# Patient Record
Sex: FEMALE | Race: WHITE | NOT HISPANIC OR LATINO
[De-identification: names, ages, dates, MRNs, and addresses within clinical notes are randomized per-mention and may not be internally consistent; named-entity substitution may affect disease eponyms.]

---

## 2020-12-29 PROBLEM — Z00.00 ENCOUNTER FOR PREVENTIVE HEALTH EXAMINATION: Status: ACTIVE | Noted: 2020-12-29

## 2021-01-04 ENCOUNTER — APPOINTMENT (OUTPATIENT)
Dept: ENDOCRINOLOGY | Facility: CLINIC | Age: 71
End: 2021-01-04
Payer: MEDICARE

## 2021-01-04 VITALS
BODY MASS INDEX: 33.8 KG/M2 | SYSTOLIC BLOOD PRESSURE: 128 MMHG | WEIGHT: 198 LBS | HEIGHT: 64 IN | DIASTOLIC BLOOD PRESSURE: 76 MMHG | TEMPERATURE: 97.7 F

## 2021-01-04 PROCEDURE — 99072 ADDL SUPL MATRL&STAF TM PHE: CPT

## 2021-01-04 PROCEDURE — 99204 OFFICE O/P NEW MOD 45 MIN: CPT

## 2021-01-04 NOTE — HISTORY OF PRESENT ILLNESS
[FreeTextEntry1] : New patient presents for evaluation for adrenal adenoma. \par Found incidentally on MRI of ultrasound\par \par C/O increased urination, irregular bowls, hypertension (220/110 at her vascular doctor, went to ER and all tests in the ER were negative)(Has not had blood pressure issues since BP stable at home on cuff, taking 2x a day) \par \par BP stable in office 128/76\par Amlodipine 5 mg daily\par Losartan 100 mg daily\par Chlorthalidone 25 mg daily\par \par No imaging present today\par Labs from 10/2020 and 11/2020 present\par \par Hypothyroid\par S/P FELDMAN approx 40 years for likely hyperthyroid\par Monitored by her PCP\par Current regimen: Synthroid 88 mcg\par Patient advised to take every day in the morning, on an empty stomach, and with no other medications. \par \par HLD\par NO updated lipid panel\par Rosuvastatin 5 mg daily

## 2021-01-04 NOTE — REVIEW OF SYSTEMS
[Recent Weight Gain (___ Lbs)] : recent weight gain: [unfilled] lbs [Blurred Vision] : blurred vision [Palpitations] : palpitations [SOB on Exertion] : shortness of breath on exertion [Polyuria] : polyuria [Nocturia] : nocturia [Polydipsia] : polydipsia [Fatigue] : no fatigue [Visual Field Defect] : no visual field defect [Dysphagia] : no dysphagia [Neck Pain] : no neck pain [Dysphonia] : no dysphonia [Chest Pain] : no chest pain [Constipation] : no constipation [Diarrhea] : no diarrhea [FreeTextEntry3] : due for ophtho exam [FreeTextEntry5] : anxiety related [FreeTextEntry8] : 9x a night

## 2021-01-04 NOTE — REASON FOR VISIT
[Initial Evaluation] : an initial evaluation [Adrenal Evaluation/Adrenal Disorder] : adrenal evaluation/adrenal disorder

## 2021-01-04 NOTE — ASSESSMENT
[FreeTextEntry1] : Adrenal adenoma\par -Will need MRI results from Dr Almonte office for MRI abdomen\par -For now, episode of htn and 50 lb weight gain in 3 years warrants further work up. Dexamethasone suppression test accompanied by biochemical plasma work up to be done\par -Will call with results\par \par \par Hyperthyroid/ Post procedural hypothyroid\par -Continue LT4 as per PCP-if pt continues to follow with our office, will continue to monitor TFTs\par \par HTN\par -Continue BP regimen as per PCP\par \par HLD\par -Continue statin\par \par Pt complaints of irregular bowls and urinary frequency- advised to follow with GI and urology\par Will base follow up frequency off lab results

## 2021-02-23 ENCOUNTER — NON-APPOINTMENT (OUTPATIENT)
Age: 71
End: 2021-02-23

## 2021-03-10 ENCOUNTER — NON-APPOINTMENT (OUTPATIENT)
Age: 71
End: 2021-03-10

## 2021-06-23 ENCOUNTER — APPOINTMENT (OUTPATIENT)
Dept: ENDOCRINOLOGY | Facility: CLINIC | Age: 71
End: 2021-06-23

## 2021-06-29 ENCOUNTER — APPOINTMENT (OUTPATIENT)
Dept: ENDOCRINOLOGY | Facility: CLINIC | Age: 71
End: 2021-06-29

## 2021-07-06 ENCOUNTER — APPOINTMENT (OUTPATIENT)
Dept: ENDOCRINOLOGY | Facility: CLINIC | Age: 71
End: 2021-07-06
Payer: MEDICARE

## 2021-07-06 VITALS
SYSTOLIC BLOOD PRESSURE: 138 MMHG | DIASTOLIC BLOOD PRESSURE: 90 MMHG | BODY MASS INDEX: 33.63 KG/M2 | OXYGEN SATURATION: 97 % | WEIGHT: 197 LBS | HEART RATE: 54 BPM | HEIGHT: 64 IN

## 2021-07-06 PROCEDURE — 99214 OFFICE O/P EST MOD 30 MIN: CPT

## 2021-07-06 NOTE — HISTORY OF PRESENT ILLNESS
[FreeTextEntry1] : Evaluation for adrenal adenoma. \par Found incidentally on MRI of abdomen\par \par C/O increased urination, irregular bowls, hypertension (220/110 at her vascular doctor, went to ER and all tests in the ER were negative)(Has not had blood pressure issues since BP stable at home on cuff, taking 2x a day) , hip pain (on tramadol) , has gained 50 lbs since COVID pandemic (admits to not indulging to the point of gaining 50 lbs), dry skin, +muscle weakness, terrible balance, thick mucous \par \par On exam, + small dorsal fat pad \par \par On labs, adrenal glands are making too much cortisol ( abnormal dex suppression test/midnight salivary cortisol) \par \par Pt still having Hypertensive episodes \par BP stable in office 138/90\par Amlodipine 5 mg daily\par Losartan 100 mg daily\par Chlorthalidone 25 mg daily\par \par Has Osteoporosis \par Was on Prolia, but is now 2 years past due (was on it for a few years)- was controlled by her rheumatologist \par Has had no fractures but 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 \par \par HLD\par No updated lipid panel\par Rosuvastatin 5 mg daily

## 2021-07-06 NOTE — REVIEW OF SYSTEMS
[Fatigue] : fatigue [Recent Weight Gain (___ Lbs)] : recent weight gain: [unfilled] lbs [Muscle Weakness] : muscle weakness [Dry Skin] : dry skin [Chest Pain] : no chest pain [Shortness Of Breath] : no shortness of breath [Constipation] : no constipation [Diarrhea] : no diarrhea

## 2021-07-06 NOTE — ASSESSMENT
[FreeTextEntry1] : Dr. Patricia came to consult patient in office visit  \par \par Adrenal adenoma\par -Discussed with patient our findings from lab results/concerns about cushings syndrome\par -Discussed what further work up is required at this time- random AM cortisol and ACTH\par -Discussed medical treatment for Cushing's syndrome but risks vs benefits , we do not think this is the best route for pt\par -Discussed potential for adrenalectomy if lab values show a suppressed ACTH and normal or high cortisol. If ACTH not suppressed, further work up is needed. \par -Will call with results and plan\par \par Osteoporosis \par -May consider patient being treated by our office. Was on prolia for many years but fell off regimen due to pandemic. Will evaluate calcium and vitamin d levels, also due for DEXA 11/2021.\par \par Hyperthyroid/ Post procedural hypothyroid\par -Continue LT4 as per PCP-need updated TFTs will next labs\par \par HTN\par -Continue BP regimen as per PCP, if cushings is ruled in, it is likely the cause of this HTN.\par -Will work together with PCP to formulate a plan if +cushings\par \par HLD\par -Continue statin as per PCP\par \par RTO

## 2021-07-22 ENCOUNTER — NON-APPOINTMENT (OUTPATIENT)
Age: 71
End: 2021-07-22

## 2021-07-29 ENCOUNTER — APPOINTMENT (OUTPATIENT)
Dept: SURGICAL ONCOLOGY | Facility: CLINIC | Age: 71
End: 2021-07-29
Payer: MEDICARE

## 2021-07-29 VITALS
SYSTOLIC BLOOD PRESSURE: 168 MMHG | BODY MASS INDEX: 33.12 KG/M2 | TEMPERATURE: 97.8 F | OXYGEN SATURATION: 97 % | HEART RATE: 68 BPM | RESPIRATION RATE: 16 BRPM | DIASTOLIC BLOOD PRESSURE: 90 MMHG | HEIGHT: 64 IN | WEIGHT: 194 LBS

## 2021-07-29 DIAGNOSIS — Z87.891 PERSONAL HISTORY OF NICOTINE DEPENDENCE: ICD-10-CM

## 2021-07-29 DIAGNOSIS — Z80.41 FAMILY HISTORY OF MALIGNANT NEOPLASM OF OVARY: ICD-10-CM

## 2021-07-29 DIAGNOSIS — Z78.9 OTHER SPECIFIED HEALTH STATUS: ICD-10-CM

## 2021-07-29 PROCEDURE — 99205 OFFICE O/P NEW HI 60 MIN: CPT

## 2021-07-30 NOTE — HISTORY OF PRESENT ILLNESS
[de-identified] : Ms. SONAM JENSEN  is a 70 year  old female with PMHx HTN, HLD, Obesity presenting for an initial consultation, referred by Dr. Román Patricia. \par \par Ms. Jensen was incidentally found to have a left 1.1 cm adrenal nodule on an MRI of her lumbosacral spine.  Subsequently,.she underwent an abdominal MRI in 10/2020 revealing 2 hepatic cysts, possible gallbladder wall adenomyosis, and 2 left adrenal nodules measuring 2.5 x 1.4 cm each.  This MRI was limited due to lack of IV contrast.   \par \par At the time, pt. had reported a 50 lb weight gain over the past 3 years despite not overindulging and high blood pressure despite being on 3 medications.  She sought care with Dr. Henning (Endocrinology) and underwent further workup.  \par \par MRI Abdomen w/ IV contrast performed on 5/21/2021 showed 2 adrenal adenomas (the left gland has a upper pole 7 mm nodule and a lower pole 17 x 22 mm nodule -consistent with adrenal adenomas).  Other findings include liver cysts.  \par \par Pt. Cushing's syndrome confirmed by failure of dexamethasone suppression and elevated midnight salivary cortisol. ACTH suppressed, so this is consistent with adrenal hyperfunction due to left adrenal adenoma. Advised surgical consult. \par \par BW:  Free urine cortisol (23.7- WNL); Cortisol Saliva (0.34) Metanephrine (33-L); Total Metanephrines (171- L); Normetanephrine (138- WNL);\par \par Pt reports a 50 lb weight gain over the past few years, especially in the mid section. She states she is very anxious and often feels stressed. She feels tired all the time, has muscle pains, feels weak.  Dry skin.  Increased urination. B/L leg cellulitis 2 months ago- now resolved. Osteoporosis.  Continues to have high blood pressure. States her BP was 200/101 this morning.  Reports pelvic cramping and lower abdominal discomfort, frequent urination. \par \par PMH: HTN, HLD, Obesity, Osteoporosis, s/p FELDMAN for hyperthyroidism 40 years ago, now with hypothyroidism (on Synthroid 88 mcg per day), anxiety\par PSH: Cataract surgery, MELECIO-BSO in her late 40's for ovarian cysts, benign left breast excisional biopsy, sinus surgery, tonsillectomy. \par Social Hx: Former smoker (1-1.5 ppd x 40 years, quit 2018), social alcohol use,  51 years, 1 son, 2 grandchildren. \par Family Hx: Mother with ovarian cancer in ther late 40's. Does not know paternal history.

## 2021-07-30 NOTE — REASON FOR VISIT
[Initial Consultation] : an initial consultation for [FreeTextEntry2] : Adrenal adenomas- Cushing's Syndrome

## 2021-07-30 NOTE — PHYSICAL EXAM
[Normal] : supple, no neck mass and thyroid not enlarged [Normal Neck Lymph Nodes] : normal neck lymph nodes  [Normal Supraclavicular Lymph Nodes] : normal supraclavicular lymph nodes [Normal Groin Lymph Nodes] : normal groin lymph nodes [Normal Axillary Lymph Nodes] : normal axillary lymph nodes [Normal] : oriented to person, place and time, with appropriate affect [de-identified] : obese, soft, ND, NT

## 2021-07-30 NOTE — REVIEW OF SYSTEMS
[Negative] : Heme/Lymph [FreeTextEntry2] : see HPI [FreeTextEntry8] : see HPI [de-identified] : see HPI [de-identified] : see HPI [de-identified] : see HPI

## 2021-07-30 NOTE — CONSULT LETTER
[Dear  ___] : Dear  [unfilled], [Consult Letter:] : I had the pleasure of evaluating your patient, [unfilled]. [Please see my note below.] : Please see my note below. [Consult Closing:] : Thank you very much for allowing me to participate in the care of this patient.  If you have any questions, please do not hesitate to contact me. [Sincerely,] : Sincerely, [FreeTextEntry3] : Shyam Brown MD, MPH, FACS, FSSO\par , [facility]\par [facility]\par Associate Professor of Surgery\par [employer] [facility]  [DrSilvia  ___] : Dr. NATH

## 2021-07-30 NOTE — ASSESSMENT
[FreeTextEntry1] : Ms. SONAM STEVENS  is a 70 year  old female with PMHx HTN, HLD, Obesity presenting for an initial consultation, referred by Dr. Román Patricia. Ms. Stevens was incidentally found to have a left 1.1 cm adrenal nodule on an MRI of her lumbosacral spine.  Subsequently,.she underwent an abdominal MRI in 10/2020 revealing 2 hepatic cysts, possible gallbladder wall adenomyosis, and 2 left adrenal nodules measuring 2.5 x 1.4 cm each.  This MRI was limited due to lack of IV contrast.   At the time, pt. had reported a 50 lb weight gain over the past 3 years despite not overindulging and high blood pressure despite being on 3 medications.  She sought care with Dr. Henning (Endocrinology) and underwent further workup.  MRI Abdomen w/ IV contrast performed on 5/21/2021 showed 2 adrenal adenomas (the left gland has a upper pole 7 mm nodule and a lower pole 17 x 22 mm nodule -consistent with adrenal adenomas).  Other findings include liver cysts.  Pt. Cushing's syndrome confirmed by failure of dexamethasone suppression and elevated midnight salivary cortisol. ACTH suppressed, so this is consistent with adrenal hyperfunction due to left adrenal adenoma. Advised surgical consult. \par \par PLAN:\par Laparoscopic robotic assisted LEFT adrenalectomy, possible open.  We discussed the risks, benefits, and alternatives of the procedure with the patient, she expressed understanding and agree to proceed.\par

## 2021-08-24 ENCOUNTER — OUTPATIENT (OUTPATIENT)
Dept: OUTPATIENT SERVICES | Facility: HOSPITAL | Age: 71
LOS: 1 days | End: 2021-08-24
Payer: COMMERCIAL

## 2021-08-24 VITALS
SYSTOLIC BLOOD PRESSURE: 160 MMHG | HEIGHT: 64 IN | WEIGHT: 195.55 LBS | RESPIRATION RATE: 18 BRPM | TEMPERATURE: 98 F | DIASTOLIC BLOOD PRESSURE: 85 MMHG | OXYGEN SATURATION: 98 % | HEART RATE: 57 BPM

## 2021-08-24 DIAGNOSIS — R94.31 ABNORMAL ELECTROCARDIOGRAM [ECG] [EKG]: ICD-10-CM

## 2021-08-24 DIAGNOSIS — E27.8 OTHER SPECIFIED DISORDERS OF ADRENAL GLAND: ICD-10-CM

## 2021-08-24 DIAGNOSIS — Z98.49 CATARACT EXTRACTION STATUS, UNSPECIFIED EYE: Chronic | ICD-10-CM

## 2021-08-24 DIAGNOSIS — Z01.818 ENCOUNTER FOR OTHER PREPROCEDURAL EXAMINATION: ICD-10-CM

## 2021-08-24 DIAGNOSIS — I10 ESSENTIAL (PRIMARY) HYPERTENSION: ICD-10-CM

## 2021-08-24 DIAGNOSIS — Z29.9 ENCOUNTER FOR PROPHYLACTIC MEASURES, UNSPECIFIED: ICD-10-CM

## 2021-08-24 DIAGNOSIS — Z91.89 OTHER SPECIFIED PERSONAL RISK FACTORS, NOT ELSEWHERE CLASSIFIED: ICD-10-CM

## 2021-08-24 DIAGNOSIS — E03.9 HYPOTHYROIDISM, UNSPECIFIED: ICD-10-CM

## 2021-08-24 DIAGNOSIS — D35.00 BENIGN NEOPLASM OF UNSPECIFIED ADRENAL GLAND: ICD-10-CM

## 2021-08-24 DIAGNOSIS — Z90.710 ACQUIRED ABSENCE OF BOTH CERVIX AND UTERUS: Chronic | ICD-10-CM

## 2021-08-24 LAB
A1C WITH ESTIMATED AVERAGE GLUCOSE RESULT: 5.1 % — SIGNIFICANT CHANGE UP (ref 4–5.6)
ALBUMIN SERPL ELPH-MCNC: 3.8 G/DL — SIGNIFICANT CHANGE UP (ref 3.3–5.2)
ALP SERPL-CCNC: 163 U/L — HIGH (ref 40–120)
ALT FLD-CCNC: 13 U/L — SIGNIFICANT CHANGE UP
ANION GAP SERPL CALC-SCNC: 12 MMOL/L — SIGNIFICANT CHANGE UP (ref 5–17)
APTT BLD: 28 SEC — SIGNIFICANT CHANGE UP (ref 27.5–35.5)
AST SERPL-CCNC: 19 U/L — SIGNIFICANT CHANGE UP
BASOPHILS # BLD AUTO: 0.05 K/UL — SIGNIFICANT CHANGE UP (ref 0–0.2)
BASOPHILS NFR BLD AUTO: 0.7 % — SIGNIFICANT CHANGE UP (ref 0–2)
BILIRUB SERPL-MCNC: 0.3 MG/DL — LOW (ref 0.4–2)
BLD GP AB SCN SERPL QL: SIGNIFICANT CHANGE UP
BUN SERPL-MCNC: 15.5 MG/DL — SIGNIFICANT CHANGE UP (ref 8–20)
CALCIUM SERPL-MCNC: 9.1 MG/DL — SIGNIFICANT CHANGE UP (ref 8.6–10.2)
CHLORIDE SERPL-SCNC: 107 MMOL/L — SIGNIFICANT CHANGE UP (ref 98–107)
CO2 SERPL-SCNC: 23 MMOL/L — SIGNIFICANT CHANGE UP (ref 22–29)
CREAT SERPL-MCNC: 0.56 MG/DL — SIGNIFICANT CHANGE UP (ref 0.5–1.3)
EOSINOPHIL # BLD AUTO: 0.17 K/UL — SIGNIFICANT CHANGE UP (ref 0–0.5)
EOSINOPHIL NFR BLD AUTO: 2.4 % — SIGNIFICANT CHANGE UP (ref 0–6)
ESTIMATED AVERAGE GLUCOSE: 100 MG/DL — SIGNIFICANT CHANGE UP (ref 68–114)
GLUCOSE SERPL-MCNC: 83 MG/DL — SIGNIFICANT CHANGE UP (ref 70–99)
HCT VFR BLD CALC: 43.6 % — SIGNIFICANT CHANGE UP (ref 34.5–45)
HGB BLD-MCNC: 14.4 G/DL — SIGNIFICANT CHANGE UP (ref 11.5–15.5)
IMM GRANULOCYTES NFR BLD AUTO: 0.3 % — SIGNIFICANT CHANGE UP (ref 0–1.5)
INR BLD: 0.88 RATIO — SIGNIFICANT CHANGE UP (ref 0.88–1.16)
LYMPHOCYTES # BLD AUTO: 1.51 K/UL — SIGNIFICANT CHANGE UP (ref 1–3.3)
LYMPHOCYTES # BLD AUTO: 21.2 % — SIGNIFICANT CHANGE UP (ref 13–44)
MCHC RBC-ENTMCNC: 29.9 PG — SIGNIFICANT CHANGE UP (ref 27–34)
MCHC RBC-ENTMCNC: 33 GM/DL — SIGNIFICANT CHANGE UP (ref 32–36)
MCV RBC AUTO: 90.6 FL — SIGNIFICANT CHANGE UP (ref 80–100)
MONOCYTES # BLD AUTO: 0.77 K/UL — SIGNIFICANT CHANGE UP (ref 0–0.9)
MONOCYTES NFR BLD AUTO: 10.8 % — SIGNIFICANT CHANGE UP (ref 2–14)
NEUTROPHILS # BLD AUTO: 4.59 K/UL — SIGNIFICANT CHANGE UP (ref 1.8–7.4)
NEUTROPHILS NFR BLD AUTO: 64.6 % — SIGNIFICANT CHANGE UP (ref 43–77)
PLATELET # BLD AUTO: 328 K/UL — SIGNIFICANT CHANGE UP (ref 150–400)
POTASSIUM SERPL-MCNC: 4.5 MMOL/L — SIGNIFICANT CHANGE UP (ref 3.5–5.3)
POTASSIUM SERPL-SCNC: 4.5 MMOL/L — SIGNIFICANT CHANGE UP (ref 3.5–5.3)
PROT SERPL-MCNC: 7.1 G/DL — SIGNIFICANT CHANGE UP (ref 6.6–8.7)
PROTHROM AB SERPL-ACNC: 10.3 SEC — LOW (ref 10.6–13.6)
RBC # BLD: 4.81 M/UL — SIGNIFICANT CHANGE UP (ref 3.8–5.2)
RBC # FLD: 15.2 % — HIGH (ref 10.3–14.5)
SODIUM SERPL-SCNC: 142 MMOL/L — SIGNIFICANT CHANGE UP (ref 135–145)
WBC # BLD: 7.11 K/UL — SIGNIFICANT CHANGE UP (ref 3.8–10.5)
WBC # FLD AUTO: 7.11 K/UL — SIGNIFICANT CHANGE UP (ref 3.8–10.5)

## 2021-08-24 PROCEDURE — 71046 X-RAY EXAM CHEST 2 VIEWS: CPT | Mod: 26

## 2021-08-24 PROCEDURE — 93005 ELECTROCARDIOGRAM TRACING: CPT

## 2021-08-24 PROCEDURE — 71046 X-RAY EXAM CHEST 2 VIEWS: CPT

## 2021-08-24 PROCEDURE — 93010 ELECTROCARDIOGRAM REPORT: CPT

## 2021-08-24 PROCEDURE — G0463: CPT

## 2021-08-24 NOTE — H&P PST ADULT - NSICDXFAMILYHX_GEN_ALL_CORE_FT
FAMILY HISTORY:  Mother  Still living? Unknown  FH: diabetes mellitus, Age at diagnosis: Age Unknown  FH: heart disease, Age at diagnosis: Age Unknown    Grandparent  Still living? Unknown  FH: diabetes mellitus, Age at diagnosis: Age Unknown  FH: heart disease, Age at diagnosis: Age Unknown

## 2021-08-24 NOTE — H&P PST ADULT - NEGATIVE NEUROLOGICAL SYMPTOMS
no transient paralysis/no weakness/no paresthesias/no generalized seizures/no focal seizures/no syncope/no tremors/no loss of sensation/no difficulty walking/no headache/no loss of consciousness/no hemiparesis

## 2021-08-24 NOTE — H&P PST ADULT - ASSESSMENT
CAPRINI SCORE    AGE RELATED RISK FACTORS                                                             [ ] Age 41-60 years                                            (1 Point)  [ ] Age: 61-74 years                                           (2 Points)                 [ ] Age= 75 years                                                (3 Points)             DISEASE RELATED RISK FACTORS                                                       [ ] Edema in the lower extremities                 (1 Point)                     [ ] Varicose veins                                               (1 Point)                                 [ ] BMI > 25 Kg/m2                                            (1 Point)                                  [ ] Serious infection (ie PNA)                            (1 Point)                     [ ] Lung disease ( COPD, Emphysema)            (1 Point)                                                                          [ ] Acute myocardial infarction                         (1 Point)                  [ ] Congestive heart failure (in the previous month)  (1 Point)         [ ] Inflammatory bowel disease                            (1 Point)                  [ ] Central venous access, PICC or Port               (2 points)       (within the last month)                                                                [ ] Stroke (in the previous month)                        (5 Points)    [ ] Previous or present malignancy                       (2 points)                                                                                                                                                         HEMATOLOGY RELATED FACTORS                                                         [ ] Prior episodes of VTE                                     (3 Points)                     [ ] Positive family history for VTE                      (3 Points)                  [ ] Prothrombin 54417 A                                     (3 Points)                     [ ] Factor V Leiden                                                (3 Points)                        [ ] Lupus anticoagulants                                      (3 Points)                                                           [ ] Anticardiolipin antibodies                              (3 Points)                                                       [ ] High homocysteine in the blood                   (3 Points)                                             [ ] Other congenital or acquired thrombophilia      (3 Points)                                                [ ] Heparin induced thrombocytopenia                  (3 Points)                                        MOBILITY RELATED FACTORS  [ ] Bed rest                                                         (1 Point)  [ ] Plaster cast                                                    (2 points)  [ ] Bed bound for more than 72 hours           (2 Points)    GENDER SPECIFIC FACTORS  [ ] Pregnancy or had a baby within the last month   (1 Point)  [ ] Post-partum < 6 weeks                                   (1 Point)  [ ] Hormonal therapy  or oral contraception   (1 Point)  [ ] History of pregnancy complications              (1 point)  [ ] Unexplained or recurrent              (1 Point)    OTHER RISK FACTORS                                           (1 Point)  [ ] BMI >40, smoking, diabetes requiring insulin, chemotherapy  blood transfusions and length of surgery over 2 hours    SURGERY RELATED RISK FACTORS  [ ]  Section within the last month     (1 Point)  [ ] Minor surgery                                                  (1 Point)  [ ] Arthroscopic surgery                                       (2 Points)  [ ] Planned major surgery lasting more            (2 Points)      than 45 minutes     [ ] Elective hip or knee joint replacement       (5 points)       surgery                                                TRAUMA RELATED RISK FACTORS  [ ] Fracture of the hip, pelvis, or leg                       (5 Points)  [ ] Spinal cord injury resulting in paralysis             (5 points)       (in the previous month)    [ ] Paralysis  (less than 1 month)                             (5 Points)  [ ] Multiple Trauma within 1 month                        (5 Points)    Total Score [        ]    Caprini Score 0-2: Low Risk, NO VTE prophylaxis required for most patients, encourage ambulation  Caprini Score 3-6: Moderate Risk , pharmacologic VTE prophylaxis is indicated for most patients (in the absence of contraindications)  Caprini Score Greater than or =7: High risk, pharmocologic VTE prophylaxis indicated for most patients (in the absence of contraindications)              OPIOID RISK TOOL    MATT EACH BOX THAT APPLIES AND ADD TOTALS AT THE END    FAMILY HISTORY OF SUBSTANCE ABUSE                 FEMALE         MALE                                                Alcohol                             [  ]1 pt          [ x ]3pts                                               Illegal Durgs                     [  ]2 pts        [  ]3pts                                               Rx Drugs                           [  ]4 pts        [  ]4 pts    PERSONAL HISTORY OF SUBSTANCE ABUSE                                                                                          Alcohol                             [  ]3 pts       [  ]3 pts                                               Illegal Durgs                     [  ]4 pts        [  ]4 pts                                               Rx Drugs                           [  ]5 pts        [  ]5 pts    AGE BETWEEN 16-45 YEARS                                      [  ]1 pt         [  ]1 pt    HISTORY OF PREADOLESCENT   SEXUAL ABUSE                                                             [  ]3 pts        [  ]0pts    PSYCHOLOGICAL DISEASE                     ADD, OCD, Bipolar, Schizophrenia        [  ]2 pts         [  ]2 pts                      Depression                                               [ x ]1 pt           [  ]1 pt           SCORING TOTAL                                      A score of 3 or lower indicated LOW risk for future opiod abuse  A score of 4 to 7 indicated moderate risk for future opiod abuse  A score of 8 or higher indicates a high risk for opiod abuse CAPRINI SCORE    AGE RELATED RISK FACTORS                                                             [ ] Age 41-60 years                                            (1 Point)  [x ] Age: 61-74 years                                           (2 Points)                 [ ] Age= 75 years                                                (3 Points)             DISEASE RELATED RISK FACTORS                                                       [] Edema in the lower extremities                 (1 Point)                     [ x] Varicose veins                                               (1 Point)                                 [x ] BMI > 25 Kg/m2                                            (1 Point)                                  [ ] Serious infection (ie PNA)                            (1 Point)                     [ ] Lung disease ( COPD, Emphysema)            (1 Point)                                                                          [ ] Acute myocardial infarction                         (1 Point)                  [ ] Congestive heart failure (in the previous month)  (1 Point)         [ ] Inflammatory bowel disease                            (1 Point)                  [ ] Central venous access, PICC or Port               (2 points)       (within the last month)                                                                [ ] Stroke (in the previous month)                        (5 Points)    [ ] Previous or present malignancy                       (2 points)                                                                                                                                                         HEMATOLOGY RELATED FACTORS                                                         [ ] Prior episodes of VTE                                     (3 Points)                     [ ] Positive family history for VTE                      (3 Points)                  [ ] Prothrombin 97121 A                                     (3 Points)                     [ ] Factor V Leiden                                                (3 Points)                        [ ] Lupus anticoagulants                                      (3 Points)                                                           [ ] Anticardiolipin antibodies                              (3 Points)                                                       [ ] High homocysteine in the blood                   (3 Points)                                             [ ] Other congenital or acquired thrombophilia      (3 Points)                                                [ ] Heparin induced thrombocytopenia                  (3 Points)                                        MOBILITY RELATED FACTORS  [ ] Bed rest                                                         (1 Point)  [ ] Plaster cast                                                    (2 points)  [ ] Bed bound for more than 72 hours           (2 Points)    GENDER SPECIFIC FACTORS  [ ] Pregnancy or had a baby within the last month   (1 Point)  [ ] Post-partum < 6 weeks                                   (1 Point)  [ ] Hormonal therapy  or oral contraception   (1 Point)  [ ] History of pregnancy complications              (1 point)  [ ] Unexplained or recurrent              (1 Point)    OTHER RISK FACTORS                                           (1 Point)  [ ] BMI >40, smoking, diabetes requiring insulin, chemotherapy  blood transfusions and length of surgery over 2 hours    SURGERY RELATED RISK FACTORS  [ ]  Section within the last month     (1 Point)  [ ] Minor surgery                                                  (1 Point)  [ ] Arthroscopic surgery                                       (2 Points)  [x ] Planned major surgery lasting more            (2 Points)      than 45 minutes     [ ] Elective hip or knee joint replacement       (5 points)       surgery                                                TRAUMA RELATED RISK FACTORS  [ ] Fracture of the hip, pelvis, or leg                       (5 Points)  [ ] Spinal cord injury resulting in paralysis             (5 points)       (in the previous month)    [ ] Paralysis  (less than 1 month)                             (5 Points)  [ ] Multiple Trauma within 1 month                        (5 Points)    Total Score [   6     ]    Caprini Score 0-2: Low Risk, NO VTE prophylaxis required for most patients, encourage ambulation  Caprini Score 3-6: Moderate Risk , pharmacologic VTE prophylaxis is indicated for most patients (in the absence of contraindications)  Caprini Score Greater than or =7: High risk, pharmocologic VTE prophylaxis indicated for most patients (in the absence of contraindications)      OPIOID RISK TOOL    MATT EACH BOX THAT APPLIES AND ADD TOTALS AT THE END    FAMILY HISTORY OF SUBSTANCE ABUSE                 FEMALE         MALE                                                Alcohol                             [  ]1 pt          [ x ]3pts                                               Illegal Durgs                     [  ]2 pts        [  ]3pts                                               Rx Drugs                           [  ]4 pts        [  ]4 pts    PERSONAL HISTORY OF SUBSTANCE ABUSE                                                                                          Alcohol                             [  ]3 pts       [  ]3 pts                                               Illegal Durgs                     [  ]4 pts        [  ]4 pts                                               Rx Drugs                           [  ]5 pts        [  ]5 pts    AGE BETWEEN 16-45 YEARS                                      [  ]1 pt         [  ]1 pt    HISTORY OF PREADOLESCENT   SEXUAL ABUSE                                                             [  ]3 pts        [  ]0pts    PSYCHOLOGICAL DISEASE                     ADD, OCD, Bipolar, Schizophrenia        [  ]2 pts         [  ]2 pts                      Depression                                               [ x ]1 pt           [  ]1 pt           SCORING TOTAL                 4                     A score of 3 or lower indicated LOW risk for future opiod abuse  A score of 4 to 7 indicated moderate risk for future opiod abuse  A score of 8 or higher indicates a high risk for opiod abuse    70 year old female with PMHx HTN, HLD, arthritis, hyperthyroidism s/p FELDMAN treatment now hypothyroid on Synthroid, patient has iodine allergy reports her first reaction was 40 years ago with FELDMAN treatment. Patient presents to PST today with complaints of weight gain and high blood pressure despite medication and diet/lifestyle modification, patient had MRI without contrast that revealed  1.1 cm adrenal nodule of her lumbosacral spine, 2 hepatic cysts, possible gallbladder wall adenomyosis, and 2 left adrenal nodules measuring 2.5 x 1.4 cm each.  MRI Abdomen w/ IV contrast performed on 2021 showed 2 adrenal adenomas (the left gland has a upper pole 7 mm nodule and a lower pole 17 x 22 mm nodule -consistent with adrenal adenomas).. Pt. Cushing's syndrome confirmed by failure of dexamethasone suppression and elevated midnight salivary cortisol. ACTH suppressed, consistent with adrenal hyperfunction due to left adrenal adenoma. Patient reports 40 lb weight gain and uncontrolled hypertension despite life style modifications, diet and adherence to BP medication, reports constant fatigue.   Patient denies fever, chills, elevated blood sugar levels, Patient is scheduled for robotic assisted left adrenalectomy on 21 with Dr Brown. Patient educated on surgical scrub, COVID testing 9/10, preadmission instructions, medical, cardiac clearance and day of procedure medications, verbalizes understanding. Pt instructed to stop vitamins/supplements/herbal medications/ASA/NSAIDS for one week prior to surgery and discuss with PMD.

## 2021-08-24 NOTE — H&P PST ADULT - NSANTHOSAYNRD_GEN_A_CORE
No. LILLIAN screening performed.  STOP BANG Legend: 0-2 = LOW Risk; 3-4 = INTERMEDIATE Risk; 5-8 = HIGH Risk

## 2021-08-24 NOTE — H&P PST ADULT - PROBLEM SELECTOR PLAN 5
Reports normal thyroid panel at last visit, take synthroid day of surgery with a sip of water, medical clearance pending

## 2021-08-24 NOTE — H&P PST ADULT - PROBLEM SELECTOR PLAN 4
/70 today, continue medication, take amlodipine and losartan DOS. Medical and cardiac clearance pending

## 2021-08-24 NOTE — H&P PST ADULT - HISTORY OF PRESENT ILLNESS
70 year old female with PMHx HTN, HLD, Obesity presenting for an initial consultation, referred by Dr. Román Patricia. Ms. Stevens was incidentally found to have a left 1.1 cm adrenal nodule on an MRI of her lumbosacral spine. Subsequently,.she underwent an abdominal MRI in 10/2020 revealing 2 hepatic cysts, possible gallbladder wall adenomyosis, and 2 left adrenal nodules measuring 2.5 x 1.4 cm each. This MRI was limited due to lack of IV contrast. At the time, pt. had reported a 50 lb weight gain over the past 3 years despite not overindulging and high blood pressure despite being on 3 medications. She sought care with Dr. Henning (Endocrinology) and underwent further workup. MRI Abdomen w/ IV contrast performed on 5/21/2021 showed 2 adrenal adenomas (the left gland has a upper pole 7 mm nodule and a lower pole 17 x 22 mm nodule -consistent with adrenal adenomas). Other findings include liver cysts. Pt. Cushing's syndrome confirmed by failure of dexamethasone suppression and elevated midnight salivary cortisol. ACTH suppressed, so this is consistent with adrenal hyperfunction due to left adrenal adenoma. Advised surgical consult.    70 year old female with PMHx HTN, HLD, hyperthyroidism s/p FELDMAN treatment now hypothyroid on Synthroid, patient has iodine allergy reports her first reaction was 40 years ago with FELDMAN treatment. Patient presents to PST today with complaints of weight gain and high blood pressure despite medication and diet/lifestyle modification, patient had MRI that revealed  Patient is scheduled for robotic assisted left adrenalectomy on 9/13/21 with Dr Brown.    Medical clearance pending       Obesity presenting for an initial consultation, referred by Dr. Román Patricia. Ms. Stevens was incidentally found to have a left 1.1 cm adrenal nodule on an MRI of her lumbosacral spine. Subsequently,.she underwent an abdominal MRI in 10/2020 revealing 2 hepatic cysts, possible gallbladder wall adenomyosis, and 2 left adrenal nodules measuring 2.5 x 1.4 cm each. This MRI was limited due to lack of IV contrast. At the time, pt. had reported a 50 lb weight gain over the past 3 years despite not overindulging and high blood pressure despite being on 3 medications. She sought care with Dr. Henning (Endocrinology) and underwent further workup. MRI Abdomen w/ IV contrast performed on 5/21/2021 showed 2 adrenal adenomas (the left gland has a upper pole 7 mm nodule and a lower pole 17 x 22 mm nodule -consistent with adrenal adenomas). Other findings include liver cysts. Pt. Cushing's syndrome confirmed by failure of dexamethasone suppression and elevated midnight salivary cortisol. ACTH suppressed, so this is consistent with adrenal hyperfunction due to left adrenal adenoma. Advised surgical consult.    70 year old female with PMHx HTN, HLD, arthritis, varicose veins, hyperthyroidism s/p FELDMAN treatment now hypothyroid on Synthroid, patient has iodine allergy reports her first reaction was 40 years ago with FELDMAN treatment. Patient presents to PST today with complaints of weight gain and high blood pressure despite medication and diet/lifestyle modification, patient had MRI without contrast that revealed  1.1 cm adrenal nodule of her lumbosacral spine, 2 hepatic cysts, possible gallbladder wall adenomyosis, and 2 left adrenal nodules measuring 2.5 x 1.4 cm each.  MRI Abdomen w/ IV contrast performed on 5/21/2021 showed 2 adrenal adenomas (the left gland has a upper pole 7 mm nodule and a lower pole 17 x 22 mm nodule -consistent with adrenal adenomas).. Pt. Cushing's syndrome confirmed by failure of dexamethasone suppression and elevated midnight salivary cortisol. ACTH suppressed, consistent with adrenal hyperfunction due to left adrenal adenoma. Patient reports 40 lb weight gain and uncontrolled hypertension despite life style modifications, diet and adherence to BP medication, reports constant fatigue.   Patient denies fever, chills, elevated blood sugar levels, Patient is scheduled for robotic assisted left adrenalectomy on 9/13/21 with Dr Brown. Medical clearance pending   covid series completed 4/2021   70 year old female with PMHx HTN, HLD, arthritis, varicose veins, hyperthyroidism s/p FELDMAN treatment now hypothyroid on Synthroid, patient has iodine allergy reports her first reaction was 40 years ago with FELDMAN treatment. Patient presents to PST today with complaints of weight gain and high blood pressure despite medication and diet/lifestyle modification, patient had MRI without contrast that revealed  1.1 cm adrenal nodule of her lumbosacral spine, 2 hepatic cysts, possible gallbladder wall adenomyosis, and 2 left adrenal nodules measuring 2.5 x 1.4 cm each.  MRI Abdomen w/ IV contrast performed on 5/21/2021 showed 2 adrenal adenomas (the left gland has a upper pole 7 mm nodule and a lower pole 17 x 22 mm nodule -consistent with adrenal adenomas).. Pt. Cushing's syndrome confirmed by failure of dexamethasone suppression and elevated midnight salivary cortisol. ACTH suppressed, consistent with adrenal hyperfunction due to left adrenal adenoma. Patient reports 40 lb weight gain and uncontrolled hypertension despite life style modifications, diet and adherence to BP medication, reports constant fatigue.   Patient denies fever, chills, elevated blood sugar levels, Patient is scheduled for robotic assisted left adrenalectomy on 9/13/21 with Dr Brown. Medical and cardiac clearance pending   covid series completed 4/2021

## 2021-08-24 NOTE — H&P PST ADULT - GASTROINTESTINAL DETAILS
soft/no distention/no masses palpable/bowel sounds normal/no bruit/no rebound tenderness/no guarding/no rigidity/no organomegaly

## 2021-08-24 NOTE — H&P PST ADULT - PROBLEM SELECTOR PLAN 2
EKG today sinus bradycardia 54, septal infarct age undetermined, saw cardiologist many years ago, patient reports history of cardiac angiogram in the past, family history of heart disease (mother), denies chest pain, pressure, palpitations or LE swelling. Patient will see Dr Tran (former cardiologist) for clearance prior to procedure

## 2021-08-24 NOTE — H&P PST ADULT - PROBLEM SELECTOR PLAN 1
Patient is scheduled for robotic assisted left adrenalectomy on 9/13/21 with Dr Brown. Medical and cardiac clearance pending

## 2021-08-24 NOTE — H&P PST ADULT - NSICDXPASTMEDICALHX_GEN_ALL_CORE_FT
PAST MEDICAL HISTORY:  Benign neoplasm of unspecified adrenal gland     Depression     Hyperlipidemia     Hypertension     Hypothyroidism      PAST MEDICAL HISTORY:  Benign neoplasm of unspecified adrenal gland     COVID-19 vaccine series completed     Cushings syndrome     Depression     Hyperlipidemia     Hypertension     Hypothyroidism

## 2021-08-25 PROBLEM — D35.00 BENIGN NEOPLASM OF UNSPECIFIED ADRENAL GLAND: Chronic | Status: ACTIVE | Noted: 2021-08-24

## 2021-08-25 PROBLEM — F32.9 MAJOR DEPRESSIVE DISORDER, SINGLE EPISODE, UNSPECIFIED: Chronic | Status: ACTIVE | Noted: 2021-08-24

## 2021-08-25 PROBLEM — E03.9 HYPOTHYROIDISM, UNSPECIFIED: Chronic | Status: ACTIVE | Noted: 2021-08-24

## 2021-08-25 PROBLEM — E78.5 HYPERLIPIDEMIA, UNSPECIFIED: Chronic | Status: ACTIVE | Noted: 2021-08-24

## 2021-08-25 PROBLEM — Z92.29 PERSONAL HISTORY OF OTHER DRUG THERAPY: Chronic | Status: ACTIVE | Noted: 2021-08-24

## 2021-08-25 PROBLEM — I10 ESSENTIAL (PRIMARY) HYPERTENSION: Chronic | Status: ACTIVE | Noted: 2021-08-24

## 2021-08-31 ENCOUNTER — APPOINTMENT (OUTPATIENT)
Dept: ENDOCRINOLOGY | Facility: CLINIC | Age: 71
End: 2021-08-31
Payer: MEDICARE

## 2021-08-31 VITALS
DIASTOLIC BLOOD PRESSURE: 80 MMHG | WEIGHT: 197 LBS | HEART RATE: 62 BPM | HEIGHT: 64 IN | OXYGEN SATURATION: 97 % | SYSTOLIC BLOOD PRESSURE: 122 MMHG | BODY MASS INDEX: 33.63 KG/M2

## 2021-08-31 PROCEDURE — 99213 OFFICE O/P EST LOW 20 MIN: CPT

## 2021-08-31 NOTE — ASSESSMENT
[FreeTextEntry1] : Cushing's syndrome due to adrenal adenoma, pre-op. No need for adrenal lowering medication as degree of hypercortisolism not severe. Hopefully cardiac workup will allow surgery to proceed as scheduled. Will follow post-op.

## 2021-08-31 NOTE — HISTORY OF PRESENT ILLNESS
[de-identified] : Ms. SONAM JENSEN  is a 70 year  old female with PMHx HTN, HLD, Obesity presenting for an initial consultation, referred by Dr. Román Patricia. \par \par Ms. Jensen was incidentally found to have a left 1.1 cm adrenal nodule on an MRI of her lumbosacral spine.  Subsequently,.she underwent an abdominal MRI in 10/2020 revealing 2 hepatic cysts, possible gallbladder wall adenomyosis, and 2 left adrenal nodules measuring 2.5 x 1.4 cm each.  This MRI was limited due to lack of IV contrast.   \par \par At the time, pt. had reported a 50 lb weight gain over the past 3 years despite not overindulging and high blood pressure despite being on 3 medications.  She sought care with Dr. Henning (Endocrinology) and underwent further workup.  \par \par MRI Abdomen w/ IV contrast performed on 5/21/2021 showed 2 adrenal adenomas (the left gland has a upper pole 7 mm nodule and a lower pole 17 x 22 mm nodule -consistent with adrenal adenomas).  Other findings include liver cysts.  \par \par Pt. Cushing's syndrome confirmed by failure of dexamethasone suppression and elevated midnight salivary cortisol. ACTH suppressed, so this is consistent with adrenal hyperfunction due to left adrenal adenoma. Advised surgical consult. \par \par BW:  Free urine cortisol (23.7- WNL); Cortisol Saliva (0.34) Metanephrine (33-L); Total Metanephrines (171- L); Normetanephrine (138- WNL);\par \par Pt reports a 50 lb weight gain over the past few years, especially in the mid section. She states she is very anxious and often feels stressed. She feels tired all the time, has muscle pains, feels weak.  Dry skin.  Increased urination. B/L leg cellulitis 2 months ago- now resolved. Osteoporosis.  Continues to have high blood pressure. States her BP was 200/101 this morning.  Reports pelvic cramping and lower abdominal discomfort, frequent urination. \par \par PMH: HTN, HLD, Obesity, Osteoporosis, s/p FELDMAN for hyperthyroidism 40 years ago, now with hypothyroidism (on Synthroid 88 mcg per day), anxiety\par PSH: Cataract surgery, MELECIO-BSO in her late 40's for ovarian cysts, benign left breast excisional biopsy, sinus surgery, tonsillectomy. \par Social Hx: Former smoker (1-1.5 ppd x 40 years, quit 2018), social alcohol use,  51 years, 1 son, 2 grandchildren. \par Family Hx: Mother with ovarian cancer in ther late 40's. Does not know paternal history. \par \par During pre-op clearance found to have EKG abnormalities. ALso developed dizziness, and seen by neurology who felt that pt. had  inner ear disturbance; MRI scheduled.\par \par

## 2021-09-10 ENCOUNTER — APPOINTMENT (OUTPATIENT)
Dept: DISASTER EMERGENCY | Facility: CLINIC | Age: 71
End: 2021-09-10

## 2021-09-13 ENCOUNTER — APPOINTMENT (OUTPATIENT)
Dept: SURGICAL ONCOLOGY | Facility: HOSPITAL | Age: 71
End: 2021-09-13

## 2021-11-01 ENCOUNTER — APPOINTMENT (OUTPATIENT)
Dept: SURGICAL ONCOLOGY | Facility: HOSPITAL | Age: 71
End: 2021-11-01

## 2021-12-30 ENCOUNTER — OUTPATIENT (OUTPATIENT)
Dept: OUTPATIENT SERVICES | Facility: HOSPITAL | Age: 71
LOS: 1 days | End: 2021-12-30
Payer: COMMERCIAL

## 2021-12-30 VITALS
RESPIRATION RATE: 20 BRPM | WEIGHT: 199.52 LBS | SYSTOLIC BLOOD PRESSURE: 140 MMHG | TEMPERATURE: 97 F | DIASTOLIC BLOOD PRESSURE: 80 MMHG | HEIGHT: 63 IN | HEART RATE: 72 BPM

## 2021-12-30 DIAGNOSIS — Z90.710 ACQUIRED ABSENCE OF BOTH CERVIX AND UTERUS: Chronic | ICD-10-CM

## 2021-12-30 DIAGNOSIS — Z90.89 ACQUIRED ABSENCE OF OTHER ORGANS: Chronic | ICD-10-CM

## 2021-12-30 DIAGNOSIS — Z29.9 ENCOUNTER FOR PROPHYLACTIC MEASURES, UNSPECIFIED: ICD-10-CM

## 2021-12-30 DIAGNOSIS — Z98.49 CATARACT EXTRACTION STATUS, UNSPECIFIED EYE: Chronic | ICD-10-CM

## 2021-12-30 DIAGNOSIS — E78.5 HYPERLIPIDEMIA, UNSPECIFIED: ICD-10-CM

## 2021-12-30 DIAGNOSIS — I10 ESSENTIAL (PRIMARY) HYPERTENSION: ICD-10-CM

## 2021-12-30 DIAGNOSIS — D35.00 BENIGN NEOPLASM OF UNSPECIFIED ADRENAL GLAND: ICD-10-CM

## 2021-12-30 DIAGNOSIS — Z01.818 ENCOUNTER FOR OTHER PREPROCEDURAL EXAMINATION: ICD-10-CM

## 2021-12-30 DIAGNOSIS — E03.9 HYPOTHYROIDISM, UNSPECIFIED: ICD-10-CM

## 2021-12-30 DIAGNOSIS — Z13.89 ENCOUNTER FOR SCREENING FOR OTHER DISORDER: ICD-10-CM

## 2021-12-30 LAB
A1C WITH ESTIMATED AVERAGE GLUCOSE RESULT: 5.3 % — SIGNIFICANT CHANGE UP (ref 4–5.6)
ALBUMIN SERPL ELPH-MCNC: 4.2 G/DL — SIGNIFICANT CHANGE UP (ref 3.3–5.2)
ALP SERPL-CCNC: 168 U/L — HIGH (ref 40–120)
ALT FLD-CCNC: 12 U/L — SIGNIFICANT CHANGE UP
ANION GAP SERPL CALC-SCNC: 12 MMOL/L — SIGNIFICANT CHANGE UP (ref 5–17)
APTT BLD: 29.3 SEC — SIGNIFICANT CHANGE UP (ref 27.5–35.5)
AST SERPL-CCNC: 15 U/L — SIGNIFICANT CHANGE UP
BASOPHILS # BLD AUTO: 0.05 K/UL — SIGNIFICANT CHANGE UP (ref 0–0.2)
BASOPHILS NFR BLD AUTO: 0.7 % — SIGNIFICANT CHANGE UP (ref 0–2)
BILIRUB SERPL-MCNC: 0.4 MG/DL — SIGNIFICANT CHANGE UP (ref 0.4–2)
BLD GP AB SCN SERPL QL: SIGNIFICANT CHANGE UP
BUN SERPL-MCNC: 12.6 MG/DL — SIGNIFICANT CHANGE UP (ref 8–20)
CALCIUM SERPL-MCNC: 9.2 MG/DL — SIGNIFICANT CHANGE UP (ref 8.6–10.2)
CHLORIDE SERPL-SCNC: 108 MMOL/L — HIGH (ref 98–107)
CO2 SERPL-SCNC: 23 MMOL/L — SIGNIFICANT CHANGE UP (ref 22–29)
CREAT SERPL-MCNC: 0.49 MG/DL — LOW (ref 0.5–1.3)
EOSINOPHIL # BLD AUTO: 0.22 K/UL — SIGNIFICANT CHANGE UP (ref 0–0.5)
EOSINOPHIL NFR BLD AUTO: 3.1 % — SIGNIFICANT CHANGE UP (ref 0–6)
ESTIMATED AVERAGE GLUCOSE: 105 MG/DL — SIGNIFICANT CHANGE UP (ref 68–114)
GLUCOSE SERPL-MCNC: 94 MG/DL — SIGNIFICANT CHANGE UP (ref 70–99)
HCT VFR BLD CALC: 45 % — SIGNIFICANT CHANGE UP (ref 34.5–45)
HGB BLD-MCNC: 14.2 G/DL — SIGNIFICANT CHANGE UP (ref 11.5–15.5)
IMM GRANULOCYTES NFR BLD AUTO: 0.3 % — SIGNIFICANT CHANGE UP (ref 0–1.5)
INR BLD: 0.89 RATIO — SIGNIFICANT CHANGE UP (ref 0.88–1.16)
LYMPHOCYTES # BLD AUTO: 1.53 K/UL — SIGNIFICANT CHANGE UP (ref 1–3.3)
LYMPHOCYTES # BLD AUTO: 21.8 % — SIGNIFICANT CHANGE UP (ref 13–44)
MCHC RBC-ENTMCNC: 29.3 PG — SIGNIFICANT CHANGE UP (ref 27–34)
MCHC RBC-ENTMCNC: 31.6 GM/DL — LOW (ref 32–36)
MCV RBC AUTO: 92.8 FL — SIGNIFICANT CHANGE UP (ref 80–100)
MONOCYTES # BLD AUTO: 0.7 K/UL — SIGNIFICANT CHANGE UP (ref 0–0.9)
MONOCYTES NFR BLD AUTO: 10 % — SIGNIFICANT CHANGE UP (ref 2–14)
NEUTROPHILS # BLD AUTO: 4.49 K/UL — SIGNIFICANT CHANGE UP (ref 1.8–7.4)
NEUTROPHILS NFR BLD AUTO: 64.1 % — SIGNIFICANT CHANGE UP (ref 43–77)
PLATELET # BLD AUTO: 339 K/UL — SIGNIFICANT CHANGE UP (ref 150–400)
POTASSIUM SERPL-MCNC: 3.9 MMOL/L — SIGNIFICANT CHANGE UP (ref 3.5–5.3)
POTASSIUM SERPL-SCNC: 3.9 MMOL/L — SIGNIFICANT CHANGE UP (ref 3.5–5.3)
PROT SERPL-MCNC: 6.8 G/DL — SIGNIFICANT CHANGE UP (ref 6.6–8.7)
PROTHROM AB SERPL-ACNC: 10.4 SEC — LOW (ref 10.6–13.6)
RBC # BLD: 4.85 M/UL — SIGNIFICANT CHANGE UP (ref 3.8–5.2)
RBC # FLD: 15.3 % — HIGH (ref 10.3–14.5)
SODIUM SERPL-SCNC: 143 MMOL/L — SIGNIFICANT CHANGE UP (ref 135–145)
WBC # BLD: 7.01 K/UL — SIGNIFICANT CHANGE UP (ref 3.8–10.5)
WBC # FLD AUTO: 7.01 K/UL — SIGNIFICANT CHANGE UP (ref 3.8–10.5)

## 2021-12-30 PROCEDURE — 93010 ELECTROCARDIOGRAM REPORT: CPT

## 2021-12-30 PROCEDURE — G0463: CPT

## 2021-12-30 PROCEDURE — 93005 ELECTROCARDIOGRAM TRACING: CPT

## 2021-12-30 RX ORDER — SERTRALINE 25 MG/1
1 TABLET, FILM COATED ORAL
Qty: 0 | Refills: 0 | DISCHARGE

## 2021-12-30 RX ORDER — CEFAZOLIN SODIUM 1 G
2000 VIAL (EA) INJECTION ONCE
Refills: 0 | Status: COMPLETED | OUTPATIENT
Start: 2022-01-03 | End: 2022-01-03

## 2021-12-30 RX ORDER — SODIUM CHLORIDE 9 MG/ML
3 INJECTION INTRAMUSCULAR; INTRAVENOUS; SUBCUTANEOUS EVERY 8 HOURS
Refills: 0 | Status: DISCONTINUED | OUTPATIENT
Start: 2022-01-03 | End: 2022-01-03

## 2021-12-30 NOTE — H&P PST ADULT - EKG AND INTERPRETATION
Normal sinus rhythm  Possible left atrial enlargement  Left ventricular hypertrophy  Cannot rule out septal infarct  Pending final interpretation

## 2021-12-30 NOTE — PATIENT PROFILE ADULT - FALL HARM RISK - UNIVERSAL INTERVENTIONS
Bed in lowest position, wheels locked, appropriate side rails in place/Call bell, personal items and telephone in reach/Instruct patient to call for assistance before getting out of bed or chair/Non-slip footwear when patient is out of bed/[place] to call system/Physically safe environment - no spills, clutter or unnecessary equipment/Purposeful Proactive Rounding/Room/bathroom lighting operational, light cord in reach

## 2021-12-30 NOTE — H&P PST ADULT - HISTORY OF PRESENT ILLNESS
71   mass to left adrenal massin 2020. Blood pressure has been elevated and has been gaining weight. Denies any symptoms. Was told that cortisol has been elevated and will need surgery  70 yo female with PMH of HLD, HTN, hypothyroidism presents to PST today. Patient reports that she was dx with left adrenal gland mass in 2020 and had planned procedure for September which was postponed due to need of a cardiac clearance. Denies any current symptoms but reports that initial symptoms were elevated blood pressure weight gain. Reports that she was told that cortisol level has been elevated and will need surgery. Scheduled for robotic assisted left adrenalectomy with Dr. Stephanie Howe on 1/3/2021.

## 2021-12-30 NOTE — H&P PST ADULT - ATTENDING COMMENTS
Risks, benefits, and alternatives discussed with the patient - she has expressed understanding and agrees to proceed with robotic-assisted laparoscopic LEFT adrenalectomy, possible open.

## 2021-12-30 NOTE — H&P PST ADULT - PROBLEM SELECTOR PLAN 5
Caprini score 5 Moderate risk  Surgical team assess/recommend pharmacological/mechanical measures for VTE prophylaxis

## 2021-12-30 NOTE — H&P PST ADULT - IS PATIENT PREGNANT?
Virtual Telephone Check-In    James Blanton verbally consents to a Virtual/Telephone Check-In visit on 08/11/20. Patient has been referred to the [facility] website at [email or web address] to review the yearly Consent to Treat document.     Patient Sherry Rose
no

## 2021-12-30 NOTE — H&P PST ADULT - NSICDXPASTMEDICALHX_GEN_ALL_CORE_FT
PAST MEDICAL HISTORY:  Benign neoplasm of unspecified adrenal gland     COVID-19 vaccine series completed     Depression     Hyperlipidemia     Hypertension     Hypothyroidism      PAST MEDICAL HISTORY:  At risk for sleep apnea Bang score 3    Benign neoplasm of unspecified adrenal gland     COVID-19 vaccine series completed     Depression     Hyperlipidemia     Hypertension     Hypothyroidism

## 2021-12-30 NOTE — H&P PST ADULT - ASSESSMENT
72 y/o female scheduled for robotic assisted left adrenalectomy with Dr. Stephanie Howe on 1/3/2021.   Medical clearance  Cardiac clearance    OPIOID RISK TOOL    MATT EACH BOX THAT APPLIES AND ADD TOTALS AT THE END    FAMILY HISTORY OF SUBSTANCE ABUSE                 FEMALE         MALE                                                Alcohol                             [  ]1 pt          [  ]3pts                                               Illegal Durgs                     [  ]2 pts        [  ]3pts                                               Rx Drugs                           [  ]4 pts        [  ]4 pts    PERSONAL HISTORY OF SUBSTANCE ABUSE                                                                                          Alcohol                             [  ]3 pts       [  ]3 pts                                               Illegal Drugs                     [  ]4 pts        [  ]4 pts                                               Rx Drugs                           [  ]5 pts        [  ]5 pts    AGE BETWEEN 16-45 YEARS                                      [  ]1 pt         [  ]1 pt    HISTORY OF PREADOLESCENT   SEXUAL ABUSE                                                             [  ]3 pts        [  ]0pts    PSYCHOLOGICAL DISEASE                     ADD, OCD, Bipolar, Schizophrenia        [  ]2 pts         [  ]2 pts                      Depression                                               [  ]1 pt           [  ]1 pt           SCORING TOTAL   (add numbers and type here)              (***)                                     A score of 3 or lower indicated LOW risk for future opioid abuse  A score of 4 to 7 indicated moderate risk for future opioid abuse  A score of 8 or higher indicates a high risk for opioid abuse   70 y/o female scheduled for robotic assisted left adrenalectomy with Dr. Stephanie Howe on 1/3/2021.   Medical clearance  Cardiac clearance    OPIOID RISK TOOL    MATT EACH BOX THAT APPLIES AND ADD TOTALS AT THE END    FAMILY HISTORY OF SUBSTANCE ABUSE                 FEMALE         MALE                                                Alcohol                             [  ]1 pt          [  ]3pts                                               Illegal Durgs                     [  ]2 pts        [  ]3pts                                               Rx Drugs                           [  ]4 pts        [  ]4 pts    PERSONAL HISTORY OF SUBSTANCE ABUSE                                                                                          Alcohol                             [  ]3 pts       [  ]3 pts                                               Illegal Drugs                     [  ]4 pts        [  ]4 pts                                               Rx Drugs                           [  ]5 pts        [  ]5 pts    AGE BETWEEN 16-45 YEARS                                      [  ]1 pt         [  ]1 pt    HISTORY OF PREADOLESCENT   SEXUAL ABUSE                                                             [  ]3 pts        [  ]0pts    PSYCHOLOGICAL DISEASE                     ADD, OCD, Bipolar, Schizophrenia        [  ]2 pts         [  ]2 pts  CAPRINI SCORE [CLOT]    AGE RELATED RISK FACTORS                                                       MOBILITY RELATED FACTORS  [ ] Age 41-60 years                                            (1 Point)                  [ ] Bed rest                                                        (1 Point)  [ x] Age: 61-74 years                                           (2 Points)                 [ ] Plaster cast                                                   (2 Points)  [ ] Age= 75 years                                              (3 Points)                 [ ] Bed bound for more than 72 hours                 (2 Points)    DISEASE RELATED RISK FACTORS                                               GENDER SPECIFIC FACTORS  [ ] Edema in the lower extremities                       (1 Point)                  [ ] Pregnancy                                                     (1 Point)  [ ] Varicose veins                                               (1 Point)                  [ ] Post-partum < 6 weeks                                   (1 Point)             [x ] BMI > 25 Kg/m2                                            (1 Point)                  [ ] Hormonal therapy  or oral contraception          (1 Point)                 [ ] Sepsis (in the previous month)                        (1 Point)                  [ ] History of pregnancy complications                 (1 point)  [ ] Pneumonia or serious lung disease                                               [ ] Unexplained or recurrent                     (1 Point)           (in the previous month)                               (1 Point)  [ ] Abnormal pulmonary function test                     (1 Point)                 SURGERY RELATED RISK FACTORS  [ ] Acute myocardial infarction                              (1 Point)                 [ ]  Section                                             (1 Point)  [ ] Congestive heart failure (in the previous month)  (1 Point)               [ ] Minor surgery                                                  (1 Point)   [ ] Inflammatory bowel disease                             (1 Point)                 [ ] Arthroscopic surgery                                        (2 Points)  [ ] Central venous access                                      (2 Points)                [x ] General surgery lasting more than 45 minutes   (2 Points)       [ ] Stroke (in the previous month)                          (5 Points)               [ ] Elective arthroplasty                                         (5 Points)                                                                                                                                               HEMATOLOGY RELATED FACTORS                                                 TRAUMA RELATED RISK FACTORS  [ ] Prior episodes of VTE                                     (3 Points)                [ ] Fracture of the hip, pelvis, or leg                       (5 Points)  [ ] Positive family history for VTE                         (3 Points)                 [ ] Acute spinal cord injury (in the previous month)  (5 Points)  [ ] Prothrombin 88435 A                                     (3 Points)                 [ ] Paralysis  (less than 1 month)                             (5 Points)  [ ] Factor V Leiden                                             (3 Points)                  [ ] Multiple Trauma within 1 month                        (5 Points)  [ ] Lupus anticoagulants                                     (3 Points)                                                           [ ] Anticardiolipin antibodies                               (3 Points)                                                       [ ] High homocysteine in the blood                      (3 Points)                                             [ ] Other congenital or acquired thrombophilia      (3 Points)                                                [ ] Heparin induced thrombocytopenia                  (3 Points)                                          Total Score [   5       ]    Caprini Score 0 - 2:  Low Risk, No VTE Prophylaxis required for most patients, encourage ambulation  Caprini Score 3 - 6:  At Risk, pharmacologic VTE prophylaxis is indicated for most patients (in the absence of a contraindication)  Caprini Score Greater than or = 7:  High Risk, pharmacologic VTE prophylaxis is indicated for most patients (in the absence of a contraindication)                    Depression                                               [ x ]1 pt           [  ]1 pt           SCORING TOTAL   (add numbers and type here)              (1)                                     A score of 3 or lower indicated LOW risk for future opioid abuse  A score of 4 to 7 indicated moderate risk for future opioid abuse  A score of 8 or higher indicates a high risk for opioid abuse

## 2022-01-02 ENCOUNTER — TRANSCRIPTION ENCOUNTER (OUTPATIENT)
Age: 72
End: 2022-01-02

## 2022-01-03 ENCOUNTER — INPATIENT (INPATIENT)
Facility: HOSPITAL | Age: 72
LOS: 1 days | Discharge: HOME | DRG: 614 | End: 2022-01-05
Attending: SURGERY | Admitting: SURGERY
Payer: COMMERCIAL

## 2022-01-03 ENCOUNTER — APPOINTMENT (OUTPATIENT)
Dept: SURGICAL ONCOLOGY | Facility: HOSPITAL | Age: 72
End: 2022-01-03

## 2022-01-03 ENCOUNTER — RESULT REVIEW (OUTPATIENT)
Age: 72
End: 2022-01-03

## 2022-01-03 VITALS
SYSTOLIC BLOOD PRESSURE: 153 MMHG | HEIGHT: 63 IN | WEIGHT: 199.52 LBS | RESPIRATION RATE: 16 BRPM | OXYGEN SATURATION: 99 % | DIASTOLIC BLOOD PRESSURE: 78 MMHG | HEART RATE: 64 BPM | TEMPERATURE: 97 F

## 2022-01-03 DIAGNOSIS — Z90.710 ACQUIRED ABSENCE OF BOTH CERVIX AND UTERUS: Chronic | ICD-10-CM

## 2022-01-03 DIAGNOSIS — D35.00 BENIGN NEOPLASM OF UNSPECIFIED ADRENAL GLAND: ICD-10-CM

## 2022-01-03 DIAGNOSIS — Z98.49 CATARACT EXTRACTION STATUS, UNSPECIFIED EYE: Chronic | ICD-10-CM

## 2022-01-03 DIAGNOSIS — Z90.89 ACQUIRED ABSENCE OF OTHER ORGANS: Chronic | ICD-10-CM

## 2022-01-03 PROCEDURE — 88341 IMHCHEM/IMCYTCHM EA ADD ANTB: CPT | Mod: 26

## 2022-01-03 PROCEDURE — 60650 LAPAROSCOPY ADRENALECTOMY: CPT | Mod: AS,LT

## 2022-01-03 PROCEDURE — 60650 LAPAROSCOPY ADRENALECTOMY: CPT | Mod: LT

## 2022-01-03 PROCEDURE — S2900 ROBOTIC SURGICAL SYSTEM: CPT | Mod: NC

## 2022-01-03 PROCEDURE — 88331 PATH CONSLTJ SURG 1 BLK 1SPC: CPT | Mod: 26

## 2022-01-03 PROCEDURE — 88307 TISSUE EXAM BY PATHOLOGIST: CPT | Mod: 26

## 2022-01-03 PROCEDURE — 88342 IMHCHEM/IMCYTCHM 1ST ANTB: CPT | Mod: 26

## 2022-01-03 RX ORDER — OXYCODONE HYDROCHLORIDE 5 MG/1
5 TABLET ORAL EVERY 6 HOURS
Refills: 0 | Status: DISCONTINUED | OUTPATIENT
Start: 2022-01-03 | End: 2022-01-05

## 2022-01-03 RX ORDER — ONDANSETRON 8 MG/1
4 TABLET, FILM COATED ORAL EVERY 6 HOURS
Refills: 0 | Status: DISCONTINUED | OUTPATIENT
Start: 2022-01-03 | End: 2022-01-05

## 2022-01-03 RX ORDER — CEFAZOLIN SODIUM 1 G
2000 VIAL (EA) INJECTION EVERY 8 HOURS
Refills: 0 | Status: COMPLETED | OUTPATIENT
Start: 2022-01-03 | End: 2022-01-04

## 2022-01-03 RX ORDER — HYDROMORPHONE HYDROCHLORIDE 2 MG/ML
1 INJECTION INTRAMUSCULAR; INTRAVENOUS; SUBCUTANEOUS
Refills: 0 | Status: DISCONTINUED | OUTPATIENT
Start: 2022-01-03 | End: 2022-01-04

## 2022-01-03 RX ORDER — ACETAMINOPHEN 500 MG
975 TABLET ORAL EVERY 8 HOURS
Refills: 0 | Status: DISCONTINUED | OUTPATIENT
Start: 2022-01-04 | End: 2022-01-05

## 2022-01-03 RX ORDER — ONDANSETRON 8 MG/1
4 TABLET, FILM COATED ORAL ONCE
Refills: 0 | Status: DISCONTINUED | OUTPATIENT
Start: 2022-01-03 | End: 2022-01-04

## 2022-01-03 RX ORDER — AMLODIPINE BESYLATE 2.5 MG/1
5 TABLET ORAL DAILY
Refills: 0 | Status: DISCONTINUED | OUTPATIENT
Start: 2022-01-03 | End: 2022-01-05

## 2022-01-03 RX ORDER — HYDROMORPHONE HYDROCHLORIDE 2 MG/ML
0.5 INJECTION INTRAMUSCULAR; INTRAVENOUS; SUBCUTANEOUS
Refills: 0 | Status: DISCONTINUED | OUTPATIENT
Start: 2022-01-03 | End: 2022-01-04

## 2022-01-03 RX ORDER — METOCLOPRAMIDE HCL 10 MG
10 TABLET ORAL ONCE
Refills: 0 | Status: DISCONTINUED | OUTPATIENT
Start: 2022-01-03 | End: 2022-01-05

## 2022-01-03 RX ORDER — ACETAMINOPHEN 500 MG
1000 TABLET ORAL ONCE
Refills: 0 | Status: COMPLETED | OUTPATIENT
Start: 2022-01-03 | End: 2022-01-03

## 2022-01-03 RX ORDER — DIPHENHYDRAMINE HCL 50 MG
12.5 CAPSULE ORAL ONCE
Refills: 0 | Status: COMPLETED | OUTPATIENT
Start: 2022-01-03 | End: 2022-01-03

## 2022-01-03 RX ORDER — LEVOTHYROXINE SODIUM 125 MCG
88 TABLET ORAL DAILY
Refills: 0 | Status: DISCONTINUED | OUTPATIENT
Start: 2022-01-03 | End: 2022-01-05

## 2022-01-03 RX ORDER — KETOROLAC TROMETHAMINE 30 MG/ML
15 SYRINGE (ML) INJECTION EVERY 6 HOURS
Refills: 0 | Status: DISCONTINUED | OUTPATIENT
Start: 2022-01-03 | End: 2022-01-04

## 2022-01-03 RX ORDER — SODIUM CHLORIDE 9 MG/ML
1000 INJECTION, SOLUTION INTRAVENOUS
Refills: 0 | Status: DISCONTINUED | OUTPATIENT
Start: 2022-01-03 | End: 2022-01-04

## 2022-01-03 RX ORDER — METOPROLOL TARTRATE 50 MG
25 TABLET ORAL
Refills: 0 | Status: DISCONTINUED | OUTPATIENT
Start: 2022-01-03 | End: 2022-01-05

## 2022-01-03 RX ORDER — LOSARTAN POTASSIUM 100 MG/1
100 TABLET, FILM COATED ORAL DAILY
Refills: 0 | Status: DISCONTINUED | OUTPATIENT
Start: 2022-01-03 | End: 2022-01-05

## 2022-01-03 RX ORDER — KETOROLAC TROMETHAMINE 30 MG/ML
15 SYRINGE (ML) INJECTION ONCE
Refills: 0 | Status: DISCONTINUED | OUTPATIENT
Start: 2022-01-03 | End: 2022-01-03

## 2022-01-03 RX ADMIN — OXYCODONE HYDROCHLORIDE 5 MILLIGRAM(S): 5 TABLET ORAL at 23:03

## 2022-01-03 RX ADMIN — Medication 100 MILLIGRAM(S): at 15:55

## 2022-01-03 RX ADMIN — Medication 100 MILLIGRAM(S): at 22:30

## 2022-01-03 RX ADMIN — SODIUM CHLORIDE 75 MILLILITER(S): 9 INJECTION, SOLUTION INTRAVENOUS at 22:19

## 2022-01-03 RX ADMIN — Medication 12.5 MILLIGRAM(S): at 20:05

## 2022-01-03 RX ADMIN — OXYCODONE HYDROCHLORIDE 5 MILLIGRAM(S): 5 TABLET ORAL at 22:32

## 2022-01-03 RX ADMIN — Medication 1000 MILLIGRAM(S): at 20:25

## 2022-01-03 RX ADMIN — Medication 400 MILLIGRAM(S): at 19:30

## 2022-01-03 NOTE — BRIEF OPERATIVE NOTE - ASSISTANT(S)
Subjective:      Mohan Campos is a 3 days female who is brought for her well child visit. History was provided by the mother. She has another chart which will be merged (Female Yas). Birth History    Birth     Length: 1' 8\" (0.508 m)     Weight: 7 lb 9.5 oz (3.445 kg)     HC 35 cm    Apgar     One: 9     Five: 9    Discharge Weight: 7 lb 4.6 oz (3.306 kg)    Delivery Method: Vaginal, Spontaneous    Gestation Age: 36 3/7 wks    Feeding: Breast Fed    Duration of Labor: 5 hours    Days in Hospital: [address] [place] [address] Name: [facility]     Tbili: 6.6 at 35 HOL, low intermediate risk   Passed hearing screen bilaterally   CCHD screening normal: pre right hand 99%, post right foot 100%           There is no immunization history on file for this patient. Hepatitis B vaccine administered in NBN. No Known Allergies       Family History   Problem Relation Age of Onset    No Known Problems Mother     No Known Problems Father        *History of previous adverse reactions to immunizations: no    Current Issues:  Current concerns about Rylee include none. She has been doing well. Occasionally does not appear interested in feeding. Review of  Issues:  Alcohol during pregnancy? no  Tobacco during pregnancy? no  Other drugs during pregnancy? no  Other complication during pregnancy, labor, or delivery? no    Review of Nutrition:  Current feeding pattern: breast milk  Difficulties with feeding: no  Currently stooling frequency: 4-5 times a day, turning green in color  Current wet diapers: at least 4 today     Social Screening:  Current child-care arrangements: in home: primary caregiver: mother, father. Sibling relations: brother: age 11. Parental coping and self-care: Doing well, no concerns. .  Secondhand smoke exposure?   Yes, dad smokes outside the home     Objective:     Visit Vitals  Temp 98 °F (36.7 °C) (Axillary) Comment: .   Resp 30   Ht 1' 8\" (0.508 m)   Wt 7 lb 4.5 oz (3.303 kg)   HC 35.6 cm   BMI 12.80 kg/m²     Wt Readings from Last 3 Encounters:   12/11/18 7 lb 4.5 oz (3.303 kg) (48 %, Z= -0.05)*     * Growth percentiles are based on WHO (Girls, 0-2 years) data. Ht Readings from Last 3 Encounters:   12/11/18 1' 8\" (0.508 m) (74 %, Z= 0.64)*     * Growth percentiles are based on WHO (Girls, 0-2 years) data. Body mass index is 12.8 kg/m². 29 %ile (Z= -0.54) based on WHO (Girls, 0-2 years) BMI-for-age based on BMI available as of 2018.  48 %ile (Z= -0.05) based on WHO (Girls, 0-2 years) weight-for-age data using vitals from 2018.  74 %ile (Z= 0.64) based on WHO (Girls, 0-2 years) Length-for-age data based on Length recorded on 2018.  88 %ile (Z= 1.20) based on WHO (Girls, 0-2 years) head circumference-for-age based on Head Circumference recorded on 2018.      -4% change in weight from birth     Growth parameters are noted and are appropriate for age. General:  alert, active, no distress, well appearing, well nourised   Skin:  normal   Head:  normal fontanelles, nl appearance, nl palate, supple neck   Eyes:  Not visualized at this time    Ears:  normal bilateral, skin tag on left ear    Mouth:  normal   Lungs:  clear to auscultation bilaterally   Heart:  regular rate and rhythm, S1, S2 normal, no murmur, click, rub or gallop   Abdomen:  soft, non-tender. Bowel sounds normal. No masses,  no organomegaly   Cord stump:  cord stump present, no surrounding erythema   Screening DDH:  Ortolani's and Foreman's signs absent bilaterally, leg length symmetrical, hip position symmetrical, thigh & gluteal folds symmetrical   :  normal female   Femoral pulses:  present bilaterally   Extremities:  extremities normal, atraumatic, no cyanosis or edema   Neuro:  alert, moves all extremities spontaneously, good 3-phase [place] reflex, good suck reflex, good rooting reflex     Assessment:     Healthy 1days old infant     Plan:     1.  Anticipatory Guidance: Transition: back to sleep, daily routines and calming techniques  [place] Care: emergency preparedness plan, frequent hand washing, avoid direct sun exposure and expect 6-8 wet diapers/day  Nutrition: breast feeding  Parental Well Being: baby blues, accept help, sleep when baby sleeps and unwanted advice   Safety: car seat, smoke free environment, no shaking, burns (Water Heater/ Smoke Detector) and crib safety    2. Screening tests:        State  metabolic screen: pending       Urine reducing substances (for galactosemia): no        Hb or HCT ([facility] recc's before 6mos if  or LBW): No       Hearing screening: Passed in NBN    3. Ultrasound of the hips to screen for developmental dysplasia of the hip: No    4. Orders placed during this Well Child Exam:  No orders of the defined types were placed in this encounter. 5)Anticipatory Guidance reviewed. Please see AVS for details. ICD-10-CM ICD-9-CM    1. [place] weight check, under 6days old Z00.110 V20.31    2.  not yet back to birth weight P92.6 779.34        Follow-up Disposition:  Return in about 7 days (around 2018) for weight check. malik mayers md pgy-2   j yesica NEFF

## 2022-01-03 NOTE — BRIEF OPERATIVE NOTE - OPERATION/FINDINGS
abdomen entered under direct visualization with 12 air seal, robotic ports placed. Multiple colonic adhesions taken down. left adrenal identified and blood supply ligated using hemolock clips. Portion of adrenal sent for frozen intraop. small splenic avulsion noted. Hemostasis obtained with cautery and tisseel. Iatrogenic colonic serosal tear repair with 3.0 silk.  All specimen removed via airseal port via endocatch. Fascia closed with 0 vicryl and ports closed with monocryl dermabond. Intraop TAP block performed.

## 2022-01-04 LAB
ANION GAP SERPL CALC-SCNC: 15 MMOL/L — SIGNIFICANT CHANGE UP (ref 5–17)
BASOPHILS # BLD AUTO: 0.01 K/UL — SIGNIFICANT CHANGE UP (ref 0–0.2)
BASOPHILS NFR BLD AUTO: 0.1 % — SIGNIFICANT CHANGE UP (ref 0–2)
BUN SERPL-MCNC: 8.5 MG/DL — SIGNIFICANT CHANGE UP (ref 8–20)
CALCIUM SERPL-MCNC: 8.7 MG/DL — SIGNIFICANT CHANGE UP (ref 8.6–10.2)
CHLORIDE SERPL-SCNC: 108 MMOL/L — HIGH (ref 98–107)
CO2 SERPL-SCNC: 20 MMOL/L — LOW (ref 22–29)
CREAT SERPL-MCNC: 0.57 MG/DL — SIGNIFICANT CHANGE UP (ref 0.5–1.3)
EOSINOPHIL # BLD AUTO: 0 K/UL — SIGNIFICANT CHANGE UP (ref 0–0.5)
EOSINOPHIL NFR BLD AUTO: 0 % — SIGNIFICANT CHANGE UP (ref 0–6)
GLUCOSE SERPL-MCNC: 113 MG/DL — HIGH (ref 70–99)
HCT VFR BLD CALC: 39.3 % — SIGNIFICANT CHANGE UP (ref 34.5–45)
HGB BLD-MCNC: 12.5 G/DL — SIGNIFICANT CHANGE UP (ref 11.5–15.5)
IMM GRANULOCYTES NFR BLD AUTO: 0.4 % — SIGNIFICANT CHANGE UP (ref 0–1.5)
LYMPHOCYTES # BLD AUTO: 0.81 K/UL — LOW (ref 1–3.3)
LYMPHOCYTES # BLD AUTO: 7.5 % — LOW (ref 13–44)
MAGNESIUM SERPL-MCNC: 1.9 MG/DL — SIGNIFICANT CHANGE UP (ref 1.6–2.6)
MCHC RBC-ENTMCNC: 29.4 PG — SIGNIFICANT CHANGE UP (ref 27–34)
MCHC RBC-ENTMCNC: 31.8 GM/DL — LOW (ref 32–36)
MCV RBC AUTO: 92.5 FL — SIGNIFICANT CHANGE UP (ref 80–100)
MONOCYTES # BLD AUTO: 0.83 K/UL — SIGNIFICANT CHANGE UP (ref 0–0.9)
MONOCYTES NFR BLD AUTO: 7.7 % — SIGNIFICANT CHANGE UP (ref 2–14)
NEUTROPHILS # BLD AUTO: 9.08 K/UL — HIGH (ref 1.8–7.4)
NEUTROPHILS NFR BLD AUTO: 84.3 % — HIGH (ref 43–77)
PHOSPHATE SERPL-MCNC: 3.1 MG/DL — SIGNIFICANT CHANGE UP (ref 2.4–4.7)
PLATELET # BLD AUTO: 300 K/UL — SIGNIFICANT CHANGE UP (ref 150–400)
POTASSIUM SERPL-MCNC: 4 MMOL/L — SIGNIFICANT CHANGE UP (ref 3.5–5.3)
POTASSIUM SERPL-SCNC: 4 MMOL/L — SIGNIFICANT CHANGE UP (ref 3.5–5.3)
RBC # BLD: 4.25 M/UL — SIGNIFICANT CHANGE UP (ref 3.8–5.2)
RBC # FLD: 15.4 % — HIGH (ref 10.3–14.5)
SODIUM SERPL-SCNC: 142 MMOL/L — SIGNIFICANT CHANGE UP (ref 135–145)
WBC # BLD: 10.77 K/UL — HIGH (ref 3.8–10.5)
WBC # FLD AUTO: 10.77 K/UL — HIGH (ref 3.8–10.5)

## 2022-01-04 RX ORDER — ENOXAPARIN SODIUM 100 MG/ML
40 INJECTION SUBCUTANEOUS DAILY
Refills: 0 | Status: DISCONTINUED | OUTPATIENT
Start: 2022-01-04 | End: 2022-01-05

## 2022-01-04 RX ADMIN — Medication 15 MILLIGRAM(S): at 01:03

## 2022-01-04 RX ADMIN — Medication 975 MILLIGRAM(S): at 22:05

## 2022-01-04 RX ADMIN — Medication 25 MILLIGRAM(S): at 06:17

## 2022-01-04 RX ADMIN — Medication 15 MILLIGRAM(S): at 12:30

## 2022-01-04 RX ADMIN — Medication 975 MILLIGRAM(S): at 14:30

## 2022-01-04 RX ADMIN — Medication 15 MILLIGRAM(S): at 06:18

## 2022-01-04 RX ADMIN — AMLODIPINE BESYLATE 5 MILLIGRAM(S): 2.5 TABLET ORAL at 06:25

## 2022-01-04 RX ADMIN — Medication 88 MICROGRAM(S): at 06:17

## 2022-01-04 RX ADMIN — LOSARTAN POTASSIUM 100 MILLIGRAM(S): 100 TABLET, FILM COATED ORAL at 06:18

## 2022-01-04 RX ADMIN — Medication 975 MILLIGRAM(S): at 06:18

## 2022-01-04 RX ADMIN — Medication 975 MILLIGRAM(S): at 07:00

## 2022-01-04 RX ADMIN — Medication 15 MILLIGRAM(S): at 17:29

## 2022-01-04 RX ADMIN — Medication 975 MILLIGRAM(S): at 21:04

## 2022-01-04 RX ADMIN — Medication 15 MILLIGRAM(S): at 12:02

## 2022-01-04 RX ADMIN — Medication 15 MILLIGRAM(S): at 18:00

## 2022-01-04 RX ADMIN — ENOXAPARIN SODIUM 40 MILLIGRAM(S): 100 INJECTION SUBCUTANEOUS at 12:02

## 2022-01-04 RX ADMIN — Medication 25 MILLIGRAM(S): at 17:29

## 2022-01-04 RX ADMIN — Medication 15 MILLIGRAM(S): at 07:00

## 2022-01-04 RX ADMIN — Medication 100 MILLIGRAM(S): at 06:19

## 2022-01-04 RX ADMIN — Medication 15 MILLIGRAM(S): at 01:30

## 2022-01-04 RX ADMIN — Medication 975 MILLIGRAM(S): at 13:50

## 2022-01-04 NOTE — PATIENT PROFILE ADULT - OVER THE PAST TWO WEEKS HAVE YOU FELT DOWN, DEPRESSED OR HOPELESS?
What Type Of Note Output Would You Prefer (Optional)?: Bullet Format How Severe Are Your Spot(S)?: moderate Have Your Spot(S) Been Treated In The Past?: has not been treated Hpi Title: Evaluation of Skin Lesions no

## 2022-01-04 NOTE — PROGRESS NOTE ADULT - SUBJECTIVE AND OBJECTIVE BOX
HPI/OVERNIGHT EVENTS:  Patient is POD 1 s/p robotic  L adrenalectomy. Patient tolerated procedure well. Intraop course uncomplicated. Patient was transferred from PACU to floor. PASTOR. Patient assessed at bedside this morning. She has no complaints. Pain is well controlled. She endorses post-op nausea, that has since improved. Tolerating CLD, stating that she would like more food. Hemodynamically stable (BP wnl) on tele. Denies fever/chills, SOB, N&V, and/or CP.     MEDICATIONS  (STANDING):  acetaminophen     Tablet .. 975 milliGRAM(s) Oral every 8 hours  amLODIPine   Tablet 5 milliGRAM(s) Oral daily  ceFAZolin   IVPB 2000 milliGRAM(s) IV Intermittent every 8 hours  ketorolac   Injectable 15 milliGRAM(s) IV Push every 6 hours  levothyroxine 88 MICROGram(s) Oral daily  losartan 100 milliGRAM(s) Oral daily  metoprolol tartrate 25 milliGRAM(s) Oral two times a day    MEDICATIONS  (PRN):  metoclopramide Injectable 10 milliGRAM(s) IV Push once PRN Nausea and/or Vomiting  ondansetron Injectable 4 milliGRAM(s) IV Push every 6 hours PRN Nausea  oxyCODONE    IR 5 milliGRAM(s) Oral every 6 hours PRN breakthrough pain      Vital Signs Last 24 Hrs  T(C): 37.6 (03 Jan 2022 23:33), Max: 37.6 (03 Jan 2022 23:33)  T(F): 99.6 (03 Jan 2022 23:33), Max: 99.6 (03 Jan 2022 23:33)  HR: 91 (04 Jan 2022 00:07) (64 - 98)  BP: 134/73 (04 Jan 2022 00:07) (122/52 - 179/75)  BP(mean): 74 (03 Jan 2022 23:33) (67 - 120)  RR: 18 (04 Jan 2022 00:07) (14 - 18)  SpO2: 93% (04 Jan 2022 00:07) (93% - 99%)    Constitutional: patient resting comfortably in bed, in no acute distress  HEENT: NCAT, EOM intact   Respiratory: respirations are unlabored, no accessory muscle use, no conversational dyspnea, RA sating well   Cardiovascular: regular rate & rhythm, HDS on tele   Gastrointestinal: Abdomen soft, appropriately tender, non-distended, no rebound tenderness / guarding, incisions c/d/i, dermabond in place   Neurological: A&O x 3; no gross sensory / motor / coordination deficits  Psychiatric: Normal mood, normal affect  Musculoskeletal: No joint pain, swelling or deformity; no limitation of movement      I&O's Detail    03 Jan 2022 07:01  -  04 Jan 2022 00:50  --------------------------------------------------------  IN:  Total IN: 0 mL    OUT:    Indwelling Catheter - Urethral (mL): 650 mL  Total OUT: 650 mL    Total NET: -650 mL          LABS:

## 2022-01-04 NOTE — PATIENT PROFILE ADULT - FALL HARM RISK - UNIVERSAL INTERVENTIONS
Bed in lowest position, wheels locked, appropriate side rails in place/Call bell, personal items and telephone in reach/Instruct patient to call for assistance before getting out of bed or chair/Non-slip footwear when patient is out of bed/[place] to call system/Physically safe environment - no spills, clutter or unnecessary equipment/Purposeful Proactive Rounding/Room/bathroom lighting operational, light cord in reach

## 2022-01-05 ENCOUNTER — TRANSCRIPTION ENCOUNTER (OUTPATIENT)
Age: 72
End: 2022-01-05

## 2022-01-05 VITALS
DIASTOLIC BLOOD PRESSURE: 66 MMHG | RESPIRATION RATE: 18 BRPM | SYSTOLIC BLOOD PRESSURE: 104 MMHG | OXYGEN SATURATION: 93 % | HEART RATE: 74 BPM | TEMPERATURE: 98 F

## 2022-01-05 PROCEDURE — 84100 ASSAY OF PHOSPHORUS: CPT

## 2022-01-05 PROCEDURE — 80048 BASIC METABOLIC PNL TOTAL CA: CPT

## 2022-01-05 PROCEDURE — 85025 COMPLETE CBC W/AUTO DIFF WBC: CPT

## 2022-01-05 PROCEDURE — S2900: CPT

## 2022-01-05 PROCEDURE — C1889: CPT

## 2022-01-05 PROCEDURE — 88341 IMHCHEM/IMCYTCHM EA ADD ANTB: CPT

## 2022-01-05 PROCEDURE — 88331 PATH CONSLTJ SURG 1 BLK 1SPC: CPT

## 2022-01-05 PROCEDURE — 83735 ASSAY OF MAGNESIUM: CPT

## 2022-01-05 PROCEDURE — 36415 COLL VENOUS BLD VENIPUNCTURE: CPT

## 2022-01-05 PROCEDURE — 88307 TISSUE EXAM BY PATHOLOGIST: CPT

## 2022-01-05 PROCEDURE — 88342 IMHCHEM/IMCYTCHM 1ST ANTB: CPT

## 2022-01-05 RX ORDER — ACETAMINOPHEN 500 MG
3 TABLET ORAL
Qty: 135 | Refills: 0
Start: 2022-01-05 | End: 2022-01-19

## 2022-01-05 RX ORDER — BENZOCAINE AND MENTHOL 5; 1 G/100ML; G/100ML
1 LIQUID ORAL
Refills: 0 | Status: DISCONTINUED | OUTPATIENT
Start: 2022-01-05 | End: 2022-01-05

## 2022-01-05 RX ORDER — ONDANSETRON 8 MG/1
1 TABLET, FILM COATED ORAL
Qty: 12 | Refills: 0
Start: 2022-01-05 | End: 2022-01-07

## 2022-01-05 RX ADMIN — ENOXAPARIN SODIUM 40 MILLIGRAM(S): 100 INJECTION SUBCUTANEOUS at 11:47

## 2022-01-05 RX ADMIN — LOSARTAN POTASSIUM 100 MILLIGRAM(S): 100 TABLET, FILM COATED ORAL at 05:55

## 2022-01-05 RX ADMIN — OXYCODONE HYDROCHLORIDE 5 MILLIGRAM(S): 5 TABLET ORAL at 06:06

## 2022-01-05 RX ADMIN — Medication 25 MILLIGRAM(S): at 05:54

## 2022-01-05 RX ADMIN — Medication 975 MILLIGRAM(S): at 06:39

## 2022-01-05 RX ADMIN — Medication 975 MILLIGRAM(S): at 05:55

## 2022-01-05 RX ADMIN — Medication 975 MILLIGRAM(S): at 11:46

## 2022-01-05 RX ADMIN — AMLODIPINE BESYLATE 5 MILLIGRAM(S): 2.5 TABLET ORAL at 05:55

## 2022-01-05 RX ADMIN — Medication 88 MICROGRAM(S): at 05:55

## 2022-01-05 NOTE — PHYSICAL THERAPY INITIAL EVALUATION ADULT - ADDITIONAL COMMENTS
Pt reports living in private home with  who is able to assist. Pt has 3+4 steps to enter with handrail and 4 stairs inside with handrail. Pt owns cane and RW but does not really use, admits to being a furniture/wall ambulator.

## 2022-01-05 NOTE — PHYSICAL THERAPY INITIAL EVALUATION ADULT - PLANNED THERAPY INTERVENTIONS, PT EVAL
pt is modified independent with use of RW, no skilled inpatient needs required at this time, PT will no longer continue to follow

## 2022-01-05 NOTE — PROGRESS NOTE ADULT - SUBJECTIVE AND OBJECTIVE BOX
HPI/OVERNIGHT EVENTS: Patient seen and examined at bedside this AM. No overnight events. Tolerating regular diet. Yesterday passed TOV. No complaints. Denies fever, chills, nausea, vomiting, chest pain, SOB, dizziness, abd pain or any other concerning symptoms.    Vital Signs Last 24 Hrs  T(C): 36.7 (04 Jan 2022 20:42), Max: 38.7 (04 Jan 2022 01:18)  T(F): 98.1 (04 Jan 2022 20:42), Max: 101.6 (04 Jan 2022 01:18)  HR: 75 (04 Jan 2022 20:42) (70 - 98)  BP: 136/76 (04 Jan 2022 20:42) (104/60 - 147/65)  BP(mean): 74 (03 Jan 2022 23:33) (67 - 84)  RR: 18 (04 Jan 2022 20:42) (14 - 19)  SpO2: 93% (04 Jan 2022 20:42) (93% - 96%)    I&O's Detail    03 Jan 2022 07:01  -  04 Jan 2022 07:00  --------------------------------------------------------  IN:  Total IN: 0 mL    OUT:    Indwelling Catheter - Urethral (mL): 1300 mL  Total OUT: 1300 mL    Total NET: -1300 mL        Constitutional: patient resting comfortably in bed, in no acute distress  HEENT: NCAT, EOM intact   Respiratory: respirations are unlabored, no accessory muscle use, no conversational dyspnea, RA sating well   Cardiovascular: regular rate & rhythm, HDS on tele   Gastrointestinal: Abdomen soft, appropriately tender, non-distended, no rebound tenderness / guarding, incisions c/d/i, dermabond in place. Umbilical port site with ecchymosis.  Neurological: A&O x 3; no gross sensory / motor / coordination deficits  Psychiatric: Normal mood, normal affect  Musculoskeletal: No joint pain, swelling or deformity; no limitation of movement    LABS:                        12.5   10.77 )-----------( 300      ( 04 Jan 2022 08:31 )             39.3     01-04    142  |  108<H>  |  8.5  ----------------------------<  113<H>  4.0   |  20.0<L>  |  0.57    Ca    8.7      04 Jan 2022 08:31  Phos  3.1     01-04  Mg     1.9     01-04            MEDICATIONS  (STANDING):  acetaminophen     Tablet .. 975 milliGRAM(s) Oral every 8 hours  amLODIPine   Tablet 5 milliGRAM(s) Oral daily  enoxaparin Injectable 40 milliGRAM(s) SubCutaneous daily  levothyroxine 88 MICROGram(s) Oral daily  losartan 100 milliGRAM(s) Oral daily  metoprolol tartrate 25 milliGRAM(s) Oral two times a day    MEDICATIONS  (PRN):  metoclopramide Injectable 10 milliGRAM(s) IV Push once PRN Nausea and/or Vomiting  ondansetron Injectable 4 milliGRAM(s) IV Push every 6 hours PRN Nausea  oxyCODONE    IR 5 milliGRAM(s) Oral every 6 hours PRN breakthrough pain

## 2022-01-05 NOTE — DISCHARGE NOTE NURSING/CASE MANAGEMENT/SOCIAL WORK - PATIENT PORTAL LINK FT
You can access the FollowMyHealth Patient Portal offered by [facility] by registering at the following website: [email or web address]. By joining [employer]’s FollowMyHealth portal, you will also be able to view your health information using other applications (apps) compatible with our system.

## 2022-01-05 NOTE — PHYSICAL THERAPY INITIAL EVALUATION ADULT - GENERAL OBSERVATIONS, REHAB EVAL
Pt received in bed, + IV loc, +Tele/, breathing on RA in NAD, in 0/10 pain, agreeable to PT evaluation

## 2022-01-05 NOTE — DISCHARGE NOTE PROVIDER - CARE PROVIDER_API CALL
Shaym Brown)  Complex General Surgical Oncology; Surgery; Surgical Oncology  [address] [place] [address]  Phone: [phone]  Fax: [phone]  Follow Up Time: 2 weeks

## 2022-01-05 NOTE — PROGRESS NOTE ADULT - ASSESSMENT
Ms. Stevens is a 70 yo F with a hx of L adrenal mass (initially noted to have Cushing syndrome, pre-op cortisol wnl), now POD 1 s/p elective robotic L adrenalectomy. Daniela-operative course uneventful. Doing well.     Plan:   - Multimodal pain control   - F/U AM labs   - Monitor lytes, replete PRN   - Encourage ambulation   - Encourage IS use   - DVT ppx: Lov qd     Dispo: Possible D/C today
Ms. Stevens is a 70 yo F with a hx of L adrenal mass (initially noted to have Cushing syndrome, pre-op cortisol wnl), now POD 1 s/p elective robotic L adrenalectomy. Daniela-operative course uneventful. Doing well.     Plan:   - Multimodal pain control   - Continue to monitor on tele   - Resume home HTN meds   - CLD, advance as tolerated   - F/U AM labs       Cortisol wnl pre-operatively, no stress dose required  - Monitor lytes, replete PRN   - Monitor UOP , if adequate plan for TOV on today   - Encourage ambulation   - Encourage IS use   - DVT ppx: Lov qd     Dispo: Possible D/C on today 1/4

## 2022-01-05 NOTE — DISCHARGE NOTE PROVIDER - NSDCFUADDINST_GEN_ALL_CORE_FT
No heavy lifting greater than 10 lbs. If you experience any increased drainage from wounds, fever/chills, nausea or vomiting. Please return to ED prior to follow up appointment.

## 2022-01-05 NOTE — DISCHARGE NOTE PROVIDER - NSDCMRMEDTOKEN_GEN_ALL_CORE_FT
acetaminophen 325 mg oral tablet: 3 tab(s) orally every 8 hours  amLODIPine 5 mg oral tablet: 1 tab(s) orally once a day  Benadryl 25 mg oral capsule:   cholestyramine: orally once a day  cholestyramine 4 g/5 g oral powder for reconstitution:   losartan 100 mg oral tablet: 1 tab(s) orally once a day  metoprolol succinate 100 mg oral tablet, extended release: 0.5 tab(s) orally once a day  pravastatin 40 mg oral tablet: 1 tab(s) orally once a day  Synthroid 88 mcg (0.088 mg) oral tablet: 1 tab(s) orally once a day  Vitamin D3 1250 mcg (50,000 intl units) oral capsule: 1 cap(s) orally once a week  Zofran 4 mg oral tablet: 1 tab(s) orally every 6 hours

## 2022-01-05 NOTE — DISCHARGE NOTE NURSING/CASE MANAGEMENT/SOCIAL WORK - NSDCPEFALRISK_GEN_ALL_CORE
For information on Fall & Injury Prevention, visit: [email or web address] OR  [email or web address] OR  https://www.cdc.gov/steadi/patient.html

## 2022-01-05 NOTE — DISCHARGE NOTE PROVIDER - NSDCCPCAREPLAN_GEN_ALL_CORE_FT
PRINCIPAL DISCHARGE DIAGNOSIS  Diagnosis: Left adrenal mass  Assessment and Plan of Treatment:

## 2022-01-05 NOTE — DISCHARGE NOTE PROVIDER - HOSPITAL COURSE
Ms. Stevens is a 70 yo F with a hx of L adrenal mass (initially noted to have Cushing syndrome, pre-op cortisol wnl),  s/p elective robotic L adrenalectomy. Patient tolerated procedure well. She was transferred to floor. Post-operatively, patient had difficulty voiding, requiring a straight cath (x1). No history of urinary retention. Patient now able to void independently. Ambulating halls with assistance. Pain controlled, tolerating diet. Medically clear for discharge.

## 2022-01-06 DIAGNOSIS — G89.18 OTHER ACUTE POSTPROCEDURAL PAIN: ICD-10-CM

## 2022-01-06 RX ORDER — OXYCODONE 5 MG/1
5 TABLET ORAL
Qty: 25 | Refills: 0 | Status: ACTIVE | COMMUNITY
Start: 2022-01-06 | End: 1900-01-01

## 2022-01-10 ENCOUNTER — EMERGENCY (EMERGENCY)
Facility: HOSPITAL | Age: 72
LOS: 1 days | Discharge: HOME | End: 2022-01-10
Attending: EMERGENCY MEDICINE
Payer: COMMERCIAL

## 2022-01-10 ENCOUNTER — NON-APPOINTMENT (OUTPATIENT)
Age: 72
End: 2022-01-10

## 2022-01-10 VITALS
DIASTOLIC BLOOD PRESSURE: 79 MMHG | HEART RATE: 86 BPM | WEIGHT: 205.03 LBS | RESPIRATION RATE: 17 BRPM | HEIGHT: 63 IN | TEMPERATURE: 97 F | OXYGEN SATURATION: 99 % | SYSTOLIC BLOOD PRESSURE: 130 MMHG

## 2022-01-10 DIAGNOSIS — Z90.89 ACQUIRED ABSENCE OF OTHER ORGANS: Chronic | ICD-10-CM

## 2022-01-10 DIAGNOSIS — Z98.49 CATARACT EXTRACTION STATUS, UNSPECIFIED EYE: Chronic | ICD-10-CM

## 2022-01-10 DIAGNOSIS — Z90.710 ACQUIRED ABSENCE OF BOTH CERVIX AND UTERUS: Chronic | ICD-10-CM

## 2022-01-10 PROBLEM — Z91.89 OTHER SPECIFIED PERSONAL RISK FACTORS, NOT ELSEWHERE CLASSIFIED: Chronic | Status: ACTIVE | Noted: 2021-12-30

## 2022-01-10 LAB
ALBUMIN SERPL ELPH-MCNC: 3.4 G/DL — SIGNIFICANT CHANGE UP (ref 3.3–5.2)
ALP SERPL-CCNC: 144 U/L — HIGH (ref 40–120)
ALT FLD-CCNC: 23 U/L — SIGNIFICANT CHANGE UP
ANION GAP SERPL CALC-SCNC: 21 MMOL/L — HIGH (ref 5–17)
APTT BLD: 33.2 SEC — SIGNIFICANT CHANGE UP (ref 27.5–35.5)
AST SERPL-CCNC: 24 U/L — SIGNIFICANT CHANGE UP
BASE EXCESS BLDV CALC-SCNC: -7.4 MMOL/L — LOW (ref -2–3)
BASOPHILS # BLD AUTO: 0.04 K/UL — SIGNIFICANT CHANGE UP (ref 0–0.2)
BASOPHILS NFR BLD AUTO: 0.5 % — SIGNIFICANT CHANGE UP (ref 0–2)
BILIRUB SERPL-MCNC: 0.3 MG/DL — LOW (ref 0.4–2)
BUN SERPL-MCNC: 7 MG/DL — LOW (ref 8–20)
CA-I SERPL-SCNC: 1.22 MMOL/L — SIGNIFICANT CHANGE UP (ref 1.15–1.33)
CALCIUM SERPL-MCNC: 9.6 MG/DL — SIGNIFICANT CHANGE UP (ref 8.6–10.2)
CHLORIDE BLDV-SCNC: 104 MMOL/L — SIGNIFICANT CHANGE UP (ref 98–107)
CHLORIDE SERPL-SCNC: 99 MMOL/L — SIGNIFICANT CHANGE UP (ref 98–107)
CO2 SERPL-SCNC: 17 MMOL/L — LOW (ref 22–29)
CREAT SERPL-MCNC: 0.6 MG/DL — SIGNIFICANT CHANGE UP (ref 0.5–1.3)
EOSINOPHIL # BLD AUTO: 0.39 K/UL — SIGNIFICANT CHANGE UP (ref 0–0.5)
EOSINOPHIL NFR BLD AUTO: 4.7 % — SIGNIFICANT CHANGE UP (ref 0–6)
FLUAV AG NPH QL: SIGNIFICANT CHANGE UP
FLUBV AG NPH QL: SIGNIFICANT CHANGE UP
GAS PNL BLDV: 136 MMOL/L — SIGNIFICANT CHANGE UP (ref 136–145)
GAS PNL BLDV: SIGNIFICANT CHANGE UP
GAS PNL BLDV: SIGNIFICANT CHANGE UP
GLUCOSE BLDV-MCNC: 64 MG/DL — LOW (ref 70–99)
GLUCOSE SERPL-MCNC: 67 MG/DL — LOW (ref 70–99)
HCO3 BLDV-SCNC: 20 MMOL/L — LOW (ref 22–29)
HCT VFR BLD CALC: 43.1 % — SIGNIFICANT CHANGE UP (ref 34.5–45)
HCT VFR BLDA CALC: 41 % — SIGNIFICANT CHANGE UP (ref 34–46)
HGB BLD CALC-MCNC: 13.8 G/DL — SIGNIFICANT CHANGE UP (ref 11.7–16.1)
HGB BLD-MCNC: 13.7 G/DL — SIGNIFICANT CHANGE UP (ref 11.5–15.5)
IMM GRANULOCYTES NFR BLD AUTO: 0.8 % — SIGNIFICANT CHANGE UP (ref 0–1.5)
INR BLD: 1.11 RATIO — SIGNIFICANT CHANGE UP (ref 0.88–1.16)
LACTATE BLDV-MCNC: 1.4 MMOL/L — SIGNIFICANT CHANGE UP (ref 0.5–2)
LIDOCAIN IGE QN: 9 U/L — LOW (ref 22–51)
LYMPHOCYTES # BLD AUTO: 1.43 K/UL — SIGNIFICANT CHANGE UP (ref 1–3.3)
LYMPHOCYTES # BLD AUTO: 17.3 % — SIGNIFICANT CHANGE UP (ref 13–44)
MAGNESIUM SERPL-MCNC: 1.8 MG/DL — SIGNIFICANT CHANGE UP (ref 1.6–2.6)
MCHC RBC-ENTMCNC: 29.3 PG — SIGNIFICANT CHANGE UP (ref 27–34)
MCHC RBC-ENTMCNC: 31.8 GM/DL — LOW (ref 32–36)
MCV RBC AUTO: 92.3 FL — SIGNIFICANT CHANGE UP (ref 80–100)
MONOCYTES # BLD AUTO: 1.11 K/UL — HIGH (ref 0–0.9)
MONOCYTES NFR BLD AUTO: 13.4 % — SIGNIFICANT CHANGE UP (ref 2–14)
NEUTROPHILS # BLD AUTO: 5.24 K/UL — SIGNIFICANT CHANGE UP (ref 1.8–7.4)
NEUTROPHILS NFR BLD AUTO: 63.3 % — SIGNIFICANT CHANGE UP (ref 43–77)
PCO2 BLDV: 44 MMHG — HIGH (ref 39–42)
PH BLDV: 7.26 — LOW (ref 7.32–7.43)
PHOSPHATE SERPL-MCNC: 3.6 MG/DL — SIGNIFICANT CHANGE UP (ref 2.4–4.7)
PLATELET # BLD AUTO: 396 K/UL — SIGNIFICANT CHANGE UP (ref 150–400)
PO2 BLDV: 45 MMHG — SIGNIFICANT CHANGE UP (ref 25–45)
POTASSIUM BLDV-SCNC: 3.8 MMOL/L — SIGNIFICANT CHANGE UP (ref 3.5–5.1)
POTASSIUM SERPL-MCNC: 3.9 MMOL/L — SIGNIFICANT CHANGE UP (ref 3.5–5.3)
POTASSIUM SERPL-SCNC: 3.9 MMOL/L — SIGNIFICANT CHANGE UP (ref 3.5–5.3)
PROT SERPL-MCNC: 6.8 G/DL — SIGNIFICANT CHANGE UP (ref 6.6–8.7)
PROTHROM AB SERPL-ACNC: 12.8 SEC — SIGNIFICANT CHANGE UP (ref 10.6–13.6)
RBC # BLD: 4.67 M/UL — SIGNIFICANT CHANGE UP (ref 3.8–5.2)
RBC # FLD: 14.6 % — HIGH (ref 10.3–14.5)
RSV RNA NPH QL NAA+NON-PROBE: SIGNIFICANT CHANGE UP
SAO2 % BLDV: 74.8 % — SIGNIFICANT CHANGE UP
SARS-COV-2 RNA SPEC QL NAA+PROBE: SIGNIFICANT CHANGE UP
SODIUM SERPL-SCNC: 137 MMOL/L — SIGNIFICANT CHANGE UP (ref 135–145)
SURGICAL PATHOLOGY STUDY: SIGNIFICANT CHANGE UP
WBC # BLD: 8.28 K/UL — SIGNIFICANT CHANGE UP (ref 3.8–10.5)
WBC # FLD AUTO: 8.28 K/UL — SIGNIFICANT CHANGE UP (ref 3.8–10.5)

## 2022-01-10 PROCEDURE — 74177 CT ABD & PELVIS W/CONTRAST: CPT | Mod: 26,MA

## 2022-01-10 PROCEDURE — 82947 ASSAY GLUCOSE BLOOD QUANT: CPT

## 2022-01-10 PROCEDURE — 83605 ASSAY OF LACTIC ACID: CPT

## 2022-01-10 PROCEDURE — 84295 ASSAY OF SERUM SODIUM: CPT

## 2022-01-10 PROCEDURE — 71045 X-RAY EXAM CHEST 1 VIEW: CPT

## 2022-01-10 PROCEDURE — 87637 SARSCOV2&INF A&B&RSV AMP PRB: CPT

## 2022-01-10 PROCEDURE — 93010 ELECTROCARDIOGRAM REPORT: CPT

## 2022-01-10 PROCEDURE — 85014 HEMATOCRIT: CPT

## 2022-01-10 PROCEDURE — 84100 ASSAY OF PHOSPHORUS: CPT

## 2022-01-10 PROCEDURE — 84132 ASSAY OF SERUM POTASSIUM: CPT

## 2022-01-10 PROCEDURE — 85025 COMPLETE CBC W/AUTO DIFF WBC: CPT

## 2022-01-10 PROCEDURE — 85610 PROTHROMBIN TIME: CPT

## 2022-01-10 PROCEDURE — 80053 COMPREHEN METABOLIC PANEL: CPT

## 2022-01-10 PROCEDURE — 99284 EMERGENCY DEPT VISIT MOD MDM: CPT | Mod: 25

## 2022-01-10 PROCEDURE — 74177 CT ABD & PELVIS W/CONTRAST: CPT | Mod: MA

## 2022-01-10 PROCEDURE — 96375 TX/PRO/DX INJ NEW DRUG ADDON: CPT

## 2022-01-10 PROCEDURE — 85018 HEMOGLOBIN: CPT

## 2022-01-10 PROCEDURE — 82803 BLOOD GASES ANY COMBINATION: CPT

## 2022-01-10 PROCEDURE — 96374 THER/PROPH/DIAG INJ IV PUSH: CPT | Mod: XU

## 2022-01-10 PROCEDURE — 99285 EMERGENCY DEPT VISIT HI MDM: CPT

## 2022-01-10 PROCEDURE — 85730 THROMBOPLASTIN TIME PARTIAL: CPT

## 2022-01-10 PROCEDURE — 83735 ASSAY OF MAGNESIUM: CPT

## 2022-01-10 PROCEDURE — 36415 COLL VENOUS BLD VENIPUNCTURE: CPT

## 2022-01-10 PROCEDURE — 83690 ASSAY OF LIPASE: CPT

## 2022-01-10 PROCEDURE — 82435 ASSAY OF BLOOD CHLORIDE: CPT

## 2022-01-10 PROCEDURE — 93005 ELECTROCARDIOGRAM TRACING: CPT

## 2022-01-10 PROCEDURE — 82330 ASSAY OF CALCIUM: CPT

## 2022-01-10 PROCEDURE — 71045 X-RAY EXAM CHEST 1 VIEW: CPT | Mod: 26

## 2022-01-10 RX ORDER — MORPHINE SULFATE 50 MG/1
6 CAPSULE, EXTENDED RELEASE ORAL ONCE
Refills: 0 | Status: DISCONTINUED | OUTPATIENT
Start: 2022-01-10 | End: 2022-01-10

## 2022-01-10 RX ORDER — DIPHENHYDRAMINE HCL 50 MG
50 CAPSULE ORAL ONCE
Refills: 0 | Status: COMPLETED | OUTPATIENT
Start: 2022-01-10 | End: 2022-01-10

## 2022-01-10 RX ORDER — SODIUM CHLORIDE 9 MG/ML
1000 INJECTION, SOLUTION INTRAVENOUS ONCE
Refills: 0 | Status: COMPLETED | OUTPATIENT
Start: 2022-01-10 | End: 2022-01-10

## 2022-01-10 RX ORDER — SODIUM CHLORIDE 9 MG/ML
1000 INJECTION INTRAMUSCULAR; INTRAVENOUS; SUBCUTANEOUS ONCE
Refills: 0 | Status: COMPLETED | OUTPATIENT
Start: 2022-01-10 | End: 2022-01-10

## 2022-01-10 RX ORDER — ONDANSETRON 8 MG/1
4 TABLET, FILM COATED ORAL ONCE
Refills: 0 | Status: COMPLETED | OUTPATIENT
Start: 2022-01-10 | End: 2022-01-10

## 2022-01-10 RX ADMIN — Medication 125 MILLIGRAM(S): at 17:50

## 2022-01-10 RX ADMIN — SODIUM CHLORIDE 1000 MILLILITER(S): 9 INJECTION, SOLUTION INTRAVENOUS at 17:50

## 2022-01-10 RX ADMIN — Medication 50 MILLIGRAM(S): at 21:37

## 2022-01-10 RX ADMIN — ONDANSETRON 4 MILLIGRAM(S): 8 TABLET, FILM COATED ORAL at 17:50

## 2022-01-10 RX ADMIN — MORPHINE SULFATE 6 MILLIGRAM(S): 50 CAPSULE, EXTENDED RELEASE ORAL at 17:50

## 2022-01-10 RX ADMIN — SODIUM CHLORIDE 1000 MILLILITER(S): 9 INJECTION INTRAMUSCULAR; INTRAVENOUS; SUBCUTANEOUS at 19:39

## 2022-01-10 NOTE — ED PROVIDER NOTE - NSICDXPASTMEDICALHX_GEN_ALL_CORE_FT
PAST MEDICAL HISTORY:  At risk for sleep apnea Bang score 3    Benign neoplasm of unspecified adrenal gland     COVID-19 vaccine series completed     Depression     Hyperlipidemia     Hypertension     Hypothyroidism

## 2022-01-10 NOTE — ED ADULT TRIAGE NOTE - CHIEF COMPLAINT QUOTE
pt states she had her left adrenal gland removed last week, now decreased PO intake and no bowel movement x5 days.

## 2022-01-10 NOTE — ED PROVIDER NOTE - OBJECTIVE STATEMENT
UBALDO JENSEN is a 72yo F with sPMH adrenal mass s/p resection Allan 3 2022 who was BIBEMS c/o abdominal pain, obstipation. She states she was seen on the third by Dr. Brown who removed an adrenal mass. She complains that she has had no return of bowel function since that time and is also still in pain. She endorses complete obstipation. She denies any nausea, vomiting, fevers, chills, chest pain, SOB, HA, vision changes, fatigue, weakness.

## 2022-01-10 NOTE — ED PROVIDER NOTE - PATIENT PORTAL LINK FT
You can access the FollowMyHealth Patient Portal offered by [facility] by registering at the following website: [email or web address]. By joining [employer]’s FollowMyHealth portal, you will also be able to view your health information using other applications (apps) compatible with our system.

## 2022-01-10 NOTE — ED ADULT NURSE NOTE - OBJECTIVE STATEMENT
Patient presenting to the ED with complaints of abdominal pain and constipation x 5 days. No distress observed on exam.

## 2022-01-10 NOTE — CONSULT NOTE ADULT - ASSESSMENT
ASSESSMENT: Patient is a 71y old female s/p robotic adrenalectomy on 1/3/22 who presented with obstipation for 5 days. Tolerating sips and voiding adequately.    PLAN:    - Recommend CT abdomen/pelvis with IV contrast  - CBC, BMP, Mag, Phos, Lactate  - Surgical Oncology will follow patinet  - Plan discussed with Attending, Dr. Brown

## 2022-01-10 NOTE — ED PROVIDER NOTE - PHYSICAL EXAMINATION
VITAL SIGNS: I have reviewed nursing notes and confirm.  CONSTITUTIONAL:  in no acute distress.  SKIN: Warm, dry, no rash.  HEAD: Normocephalic, atraumatic.  EYES: PERRL, conjunctiva and sclera clear.  ENT: pink & moist mucosa, no pharyngeal erythema  NECK: Supple, non tender. No cervical lymphadenopathy.  CARD: Regular rate and rhythm. No murmurs.   RESP: No wheezes, rales or rhonchi.   ABD:  GENERALIZED ABDOMINAL TENDERNESS, MILD DISTENSION, Port sites healing well w/ no discharge, erythema. Other wise abdomen soft  NEURO: Alert, oriented. Grossly unremarkable. No focal deficits.   PSYCH: Cooperative, alert & oriented x3

## 2022-01-10 NOTE — ED PROVIDER NOTE - NSICDXPASTSURGICALHX_GEN_ALL_CORE_FT
PAST SURGICAL HISTORY:  H/O: hysterectomy     History of tonsillectomy     S/P cataract surgery

## 2022-01-10 NOTE — ED PROVIDER NOTE - CLINICAL SUMMARY MEDICAL DECISION MAKING FREE TEXT BOX
ASSESSMENT:   SONAM JENSEN is a 72yo F with sPMH adrenal mass s/p resection on 01/03/22 who presents with abdominal pain and obstipation.    Concerning for opiate induced constipation vs ileus vs sbo    PLAN:  Medications  Pain control w/ morphine  Zofran      Studies  CT Abd/Pelvis  CBC, CMP, VBG, Lipase

## 2022-01-10 NOTE — ED ADULT NURSE NOTE - NSIMPLEMENTINTERV_GEN_ALL_ED
Implemented All Fall with Harm Risk Interventions:  [place] to call system. Call bell, personal items and telephone within reach. Instruct patient to call for assistance. Room bathroom lighting operational. Non-slip footwear when patient is off stretcher. Physically safe environment: no spills, clutter or unnecessary equipment. Stretcher in lowest position, wheels locked, appropriate side rails in place. Provide visual cue, wrist band, yellow gown, etc. Monitor gait and stability. Monitor for mental status changes and reorient to person, place, and time. Review medications for side effects contributing to fall risk. Reinforce activity limits and safety measures with patient and family. Provide visual clues: red socks.

## 2022-01-10 NOTE — CONSULT NOTE ADULT - SUBJECTIVE AND OBJECTIVE BOX
ACUTE CARE SURGERY CONSULT     HPI: Ms. Stevens is a 70 yo F with a hx of L adrenal mass (initially noted to have Cushing syndrome, pre-op cortisol wnl),  s/p elective robotic L adrenalectomy on 1/3/22 with Dr. Brown that was well tolerated. Patient presents now to the ER c/o obstipation for the past 5 days and one episode of bilious vomit 2 days ago. Able to tolerate small amounts of sips but now starting to get dry heaves. Voiding adequately. Denies fever, chills, chest pain, and sob.       ROS: 10-system review is otherwise negative except HPI above.      PAST MEDICAL & SURGICAL HISTORY:  Benign neoplasm of unspecified adrenal gland  Hypertension  Hyperlipidemia  Hypothyroidism  Depression  COVID-19 vaccine series completed  At risk for sleep apnea  Bang score 3  H/O: hysterectomy  S/P cataract surgery  History of tonsillectomy      FAMILY HISTORY:  FH: heart disease (Mother, Grandparent)  FH: diabetes mellitus (Mother, Grandparent)    SOCIAL HISTORY:  Denies any toxic habits    ALLERGIES: NKA iodinated radiocontrast agents (Anaphylaxis; Angioedema)  sulfa drugs (Unknown)  Tequin (Unknown)    HOME MEDICATIONS:  amLODIPine 5 mg oral tablet: 1 tab(s) orally once a day (03 Jan 2022 11:58)  Benadryl 25 mg oral capsule:  (03 Jan 2022 11:58)  cholestyramine: orally once a day (03 Jan 2022 11:58)  cholestyramine 4 g/5 g oral powder for reconstitution:  (03 Jan 2022 11:58)  losartan 100 mg oral tablet: 1 tab(s) orally once a day (03 Jan 2022 11:58)  metoprolol succinate 100 mg oral tablet, extended release: 0.5 tab(s) orally once a day (03 Jan 2022 11:58)  pravastatin 40 mg oral tablet: 1 tab(s) orally once a day (03 Jan 2022 11:58)  Synthroid 88 mcg (0.088 mg) oral tablet: 1 tab(s) orally once a day (03 Jan 2022 11:58)  Vitamin D3 1250 mcg (50,000 intl units) oral capsule: 1 cap(s) orally once a week (03 Jan 2022 11:58)      --------------------------------------------------------------------------------------------    PHYSICAL EXAM:   General: NAD, Lying in bed comfortably  Neuro: A+Ox3  HEENT: EOMI, PERRLA, MMM  Cardio: RRR  Resp: Non labored breathing on RA  GI/Abd: Soft, no rebound/guarding, no masses palpated. Mildly distended. Mild tenderness to palpation in LLQ and RLQ.   Vascular: All 4 extremities warm and well perfused.   Pelvis: stable  Musculoskeletal: All 4 extremities moving spontaneously, no limitations, no spinal tenderness.  --------------------------------------------------------------------------------------------    LABS                   CAPILLARY BLOOD GLUCOSE              --------------------------------------------------------------------------------------------  IMAGING

## 2022-01-10 NOTE — ED PROVIDER NOTE - ATTENDING CONTRIBUTION TO CARE
I personally saw the patient with the resident, and completed the key components of the history and physical exam. I then discussed the management plan with the resident.    70 y/o F with HTN, HLD, hypothyroidism, s/p removal of benign adrenal mass on 1/3/22 by Dr. Brown presents for abdominal pain that is diffuse with obstipation, inability to pass flatus, intractable nausea and multiple episodes of vomiting. She states she has not had stool since the surgery and has not passed flatus since discharge. She is having difficulty urinating as well and needs to position herself in certain ways to pass a small amount of urine. She denies fevers, chills, chest pain, coughing. She took a colace, but vomited it right up.    AP - NAD, non-toxic appearing, RRR, lungs CTA B/L, abd soft, diffusely tender to even minimal palpation, well appearing laproscopic surgical scars on abdomen, no LE edema.    Patient seen by surgery, recommends CT with IV contrast - patient unsure if she has contrast allergy, but thinks she might, cannot recall if she has ever been pre-medicated for CT scan with IV contrast or what her allergy may be. Will premedicate patient on [facility] 5 hour protocol, which was communicated to the RN, surgery to follow results. Pain control, fluids, reassess.

## 2022-01-10 NOTE — ED PROVIDER NOTE - NS ED ROS FT
Review of Systems  CONSTITUTIONAL: afebrile w/no diaphoresis or weight changes  SKIN: warm, dry w/ no rash or bleeding  EYES: no changes to vision  ENT: no changes in hearing, no sore throat  RESPIRATORY: no cough or SOB  CARDIAC: no chest pain & no palpitations  GI: +ABDOMINAL PAIN, + CONSTIPATION, denies blood in stool/ana maria blood  GENITO-URINARY: no discharge, dysuria or hematuria,   MUSCULOSKELETAL:  no joint pain, swelling or redness  NEUROLOGIC: no weakness, headache, anesthesia or paresthesias  PSYCH: no anxiety, non suicidal, non homicidal, without hallucinations or depression

## 2022-01-10 NOTE — ED PROVIDER NOTE - PROGRESS NOTE DETAILS
Reviewed negative w/u results with pt. Pt reports feeling much improved. D/w ACS team, pt cleared for d/c, will need to f/u with Dr. Brown outpt. Pt comfortable with plan for d/c. Pt agrees to f/u with Dr. Brown. Return instructions given, questions answered. - Tristin Millan, PGY-3

## 2022-01-11 VITALS
OXYGEN SATURATION: 98 % | HEART RATE: 89 BPM | DIASTOLIC BLOOD PRESSURE: 82 MMHG | SYSTOLIC BLOOD PRESSURE: 128 MMHG | TEMPERATURE: 98 F | RESPIRATION RATE: 18 BRPM

## 2022-01-20 ENCOUNTER — APPOINTMENT (OUTPATIENT)
Dept: SURGICAL ONCOLOGY | Facility: CLINIC | Age: 72
End: 2022-01-20
Payer: MEDICARE

## 2022-01-20 VITALS
TEMPERATURE: 97.7 F | WEIGHT: 205 LBS | HEART RATE: 79 BPM | RESPIRATION RATE: 16 BRPM | HEIGHT: 63 IN | BODY MASS INDEX: 36.32 KG/M2 | DIASTOLIC BLOOD PRESSURE: 72 MMHG | SYSTOLIC BLOOD PRESSURE: 110 MMHG | OXYGEN SATURATION: 97 %

## 2022-01-20 PROCEDURE — 99024 POSTOP FOLLOW-UP VISIT: CPT

## 2022-01-24 NOTE — CONSULT LETTER
[Dear  ___] : Dear  [unfilled], [Consult Letter:] : I had the pleasure of evaluating your patient, [unfilled]. [Please see my note below.] : Please see my note below. [Consult Closing:] : Thank you very much for allowing me to participate in the care of this patient.  If you have any questions, please do not hesitate to contact me. [Sincerely,] : Sincerely, [FreeTextEntry3] : Shyam Brown MD, MPH, FACS, FSSO\par , [facility]\par [facility]\par Associate Professor of Surgery\par [employer] [facility]  [DrSilvia  ___] : Dr. NATH

## 2022-01-24 NOTE — PHYSICAL EXAM
[Normal] : well developed, well nourished, in no acute distress [de-identified] : soft, ND, NT;  Trochar sites C/D/I

## 2022-01-24 NOTE — HISTORY OF PRESENT ILLNESS
[de-identified] : Ms. SONAM JENSEN  is a 70 year  old female with PMHx HTN, HLD, Obesity presenting for an initial post op visit/ post hospitalization visit. \par She was seen in initial consultation on 7/29/2021, referred by Dr. Román Patricia. \par \par Ms. Jensen was incidentally found to have a left 1.1 cm adrenal nodule on an MRI of her lumbosacral spine.  Subsequently,.she underwent an abdominal MRI in 10/2020 revealing 2 hepatic cysts, possible gallbladder wall adenomyosis, and 2 left adrenal nodules measuring 2.5 x 1.4 cm each.  This MRI was limited due to lack of IV contrast.   \par \par At the time, pt. had reported a 50 lb weight gain over the past 3 years despite not overindulging and high blood pressure despite being on 3 medications.  She sought care with Dr. Henning (Endocrinology) and underwent further workup.  \par \par MRI Abdomen w/ IV contrast performed on 5/21/2021 showed 2 adrenal adenomas (the left gland has a upper pole 7 mm nodule and a lower pole 17 x 22 mm nodule -consistent with adrenal adenomas).  Other findings include liver cysts.  \par \par Pt. Cushing's syndrome confirmed by failure of dexamethasone suppression and elevated midnight salivary cortisol. ACTH suppressed, so this is consistent with adrenal hyperfunction due to left adrenal adenoma. Advised surgical consult. \par \par BW:  Free urine cortisol (23.7- WNL); Cortisol Saliva (0.34) Metanephrine (33-L); Total Metanephrines (171- L); Normetanephrine (138- WNL);\par \par Pt reports a 50 lb weight gain over the past few years, especially in the mid section. She states she is very anxious and often feels stressed. She feels tired all the time, has muscle pains, feels weak.  Dry skin.  Increased urination. B/L leg cellulitis 2 months ago- now resolved. Osteoporosis.  Continues to have high blood pressure. States her BP was 200/101 this morning.  Reports pelvic cramping and lower abdominal discomfort, frequent urination. \par \par PMH: HTN, HLD, Obesity, Osteoporosis, s/p FELDMAN for hyperthyroidism 40 years ago, now with hypothyroidism (on Synthroid 88 mcg per day), anxiety\par PSH: Cataract surgery, MELECIO-BSO in her late 40's for ovarian cysts, benign left breast excisional biopsy, sinus surgery, tonsillectomy. \par Social Hx: Former smoker (1-1.5 ppd x 40 years, quit 2018), social alcohol use,  51 years, 1 son, 2 grandchildren. \par Family Hx: Mother with ovarian cancer in ther late 40's. Does not know paternal history. \par \par INTERVAL HISTORY:\par ***SURGERY 1/3/2022- S/p laparoscopic robotic assisted left adrenalectomy \par ***FINAL PATHOLOGY-  \par 1) Adrenal cortical adenoma (1.5 cm). No necrosis or capsular invasion seen.  No increase in mitotic figures. \par 2) Adrenal cortical adenoma (3 cm), focal capsular invasion seen, no necrosis or increase in mitotic figures. \par \par 1/20/22- Pt. called on 1/10 with c/o N/V, constipation, inability to pass flatus, chills and sweats.  Pt. was advised to go to the ED. She was evaluated at [facility]. 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  BW was WNL.  Pt. was discharged home.  Patient states that she is feeling really unwell.  Pt. states she is hardly eating and is dry heaving often. She is also with c/o chills and feeling cold constantly. Denies fevers.  She also states she feels weaker and experiences leg shaking/wobbling with position changes and getting into a standing position.  Denies nausea, vomiting. \par

## 2022-01-24 NOTE — ASSESSMENT
[FreeTextEntry1] : Ms. SONAM JENSEN  is a 70 year  old female with PMHx HTN, HLD, Obesity who was incidentally found to have a left 1.1 cm adrenal nodule on an MRI of her lumbosacral spine.  Subsequently,.she underwent an abdominal MRI in 10/2020 revealing 2 hepatic cysts, possible gallbladder wall adenomyosis, and 2 left adrenal nodules measuring 2.5 x 1.4 cm each.  This MRI was limited due to lack of IV contrast.   At the time, pt. had reported a 50 lb weight gain over the past 3 years despite not overindulging and high blood pressure despite being on 3 medications.  She sought care with Dr. Henning (Endocrinology) and underwent further workup.  MRI Abdomen w/ IV contrast performed on 5/21/2021 showed 2 adrenal adenomas (the left gland has a upper pole 7 mm nodule and a lower pole 17 x 22 mm nodule -consistent with adrenal adenomas).  Other findings include liver cysts.  Pt. Cushing's syndrome confirmed by failure of dexamethasone suppression and elevated midnight salivary cortisol. ACTH suppressed, so this is consistent with adrenal hyperfunction due to left adrenal adenoma. Advised surgical consult. \par \par ***SURGERY 1/3/2022- S/p laparoscopic robotic assisted left adrenalectomy \par ***FINAL PATHOLOGY-  \par 1) Adrenal cortical adenoma (1.5 cm). No necrosis or capsular invasion seen.  No increase in mitotic figures. \par 2) Adrenal cortical adenoma (3 cm), focal capsular invasion seen, no necrosis or increase in mitotic figures. \par \par  Pt. called on 1/10 with c/o N/V, constipation, inability to pass flatus, chills and sweats.  Pt. was advised to go to the ED. She was evaluated at [facility]. 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  BW was WNL.  Pt. was discharged home.  Patient states that she is feeling really unwell.  Pt. states she is hardly eating and is dry heaving often. She is also with c/o chills and feeling cold constantly. Denies fevers.  She also states she feels weaker and experiences leg shaking/wobbling with position changes and getting into a standing position.  Denies nausea, vomiting. \par \par PLAN:\par 1) Aggressive bowel regimen to help with daily bowel movements\par 2) ACTH, cortisol levels\par 3) RTO in 3-4 weeks

## 2022-01-26 ENCOUNTER — INPATIENT (INPATIENT)
Facility: HOSPITAL | Age: 72
LOS: 6 days | Discharge: NURSING, REHAB OR LONG-TERM CARE | DRG: 312 | End: 2022-02-02
Attending: FAMILY MEDICINE | Admitting: FAMILY MEDICINE
Payer: COMMERCIAL

## 2022-01-26 VITALS
TEMPERATURE: 98 F | OXYGEN SATURATION: 96 % | HEART RATE: 97 BPM | RESPIRATION RATE: 22 BRPM | WEIGHT: 190.04 LBS | HEIGHT: 63 IN

## 2022-01-26 DIAGNOSIS — N17.9 ACUTE KIDNEY FAILURE, UNSPECIFIED: ICD-10-CM

## 2022-01-26 DIAGNOSIS — Z90.89 ACQUIRED ABSENCE OF OTHER ORGANS: Chronic | ICD-10-CM

## 2022-01-26 DIAGNOSIS — Z90.710 ACQUIRED ABSENCE OF BOTH CERVIX AND UTERUS: Chronic | ICD-10-CM

## 2022-01-26 DIAGNOSIS — E89.6 POSTPROCEDURAL ADRENOCORTICAL (-MEDULLARY) HYPOFUNCTION: Chronic | ICD-10-CM

## 2022-01-26 DIAGNOSIS — Z98.49 CATARACT EXTRACTION STATUS, UNSPECIFIED EYE: Chronic | ICD-10-CM

## 2022-01-26 LAB
ALBUMIN SERPL ELPH-MCNC: 3.2 G/DL — LOW (ref 3.3–5.2)
ALP SERPL-CCNC: 117 U/L — SIGNIFICANT CHANGE UP (ref 40–120)
ALT FLD-CCNC: 33 U/L — HIGH
ANION GAP SERPL CALC-SCNC: 15 MMOL/L — SIGNIFICANT CHANGE UP (ref 5–17)
APTT BLD: 30.2 SEC — SIGNIFICANT CHANGE UP (ref 27.5–35.5)
AST SERPL-CCNC: 43 U/L — HIGH
BASOPHILS # BLD AUTO: 0.04 K/UL — SIGNIFICANT CHANGE UP (ref 0–0.2)
BASOPHILS NFR BLD AUTO: 0.5 % — SIGNIFICANT CHANGE UP (ref 0–2)
BILIRUB SERPL-MCNC: 0.5 MG/DL — SIGNIFICANT CHANGE UP (ref 0.4–2)
BUN SERPL-MCNC: 14.7 MG/DL — SIGNIFICANT CHANGE UP (ref 8–20)
CALCIUM SERPL-MCNC: 8.6 MG/DL — SIGNIFICANT CHANGE UP (ref 8.6–10.2)
CHLORIDE SERPL-SCNC: 104 MMOL/L — SIGNIFICANT CHANGE UP (ref 98–107)
CO2 SERPL-SCNC: 19 MMOL/L — LOW (ref 22–29)
CREAT SERPL-MCNC: 2.37 MG/DL — HIGH (ref 0.5–1.3)
EOSINOPHIL # BLD AUTO: 0.42 K/UL — SIGNIFICANT CHANGE UP (ref 0–0.5)
EOSINOPHIL NFR BLD AUTO: 5.2 % — SIGNIFICANT CHANGE UP (ref 0–6)
GLUCOSE SERPL-MCNC: 104 MG/DL — HIGH (ref 70–99)
HCT VFR BLD CALC: 42.1 % — SIGNIFICANT CHANGE UP (ref 34.5–45)
HGB BLD-MCNC: 13.8 G/DL — SIGNIFICANT CHANGE UP (ref 11.5–15.5)
IMM GRANULOCYTES NFR BLD AUTO: 0.4 % — SIGNIFICANT CHANGE UP (ref 0–1.5)
INR BLD: 1.08 RATIO — SIGNIFICANT CHANGE UP (ref 0.88–1.16)
LACTATE BLDV-MCNC: 1.2 MMOL/L — SIGNIFICANT CHANGE UP (ref 0.5–2)
LACTATE BLDV-MCNC: 2.2 MMOL/L — HIGH (ref 0.5–2)
LYMPHOCYTES # BLD AUTO: 1.77 K/UL — SIGNIFICANT CHANGE UP (ref 1–3.3)
LYMPHOCYTES # BLD AUTO: 21.7 % — SIGNIFICANT CHANGE UP (ref 13–44)
MCHC RBC-ENTMCNC: 29.5 PG — SIGNIFICANT CHANGE UP (ref 27–34)
MCHC RBC-ENTMCNC: 32.8 GM/DL — SIGNIFICANT CHANGE UP (ref 32–36)
MCV RBC AUTO: 90 FL — SIGNIFICANT CHANGE UP (ref 80–100)
MONOCYTES # BLD AUTO: 1.08 K/UL — HIGH (ref 0–0.9)
MONOCYTES NFR BLD AUTO: 13.3 % — SIGNIFICANT CHANGE UP (ref 2–14)
NEUTROPHILS # BLD AUTO: 4.8 K/UL — SIGNIFICANT CHANGE UP (ref 1.8–7.4)
NEUTROPHILS NFR BLD AUTO: 58.9 % — SIGNIFICANT CHANGE UP (ref 43–77)
PLATELET # BLD AUTO: 345 K/UL — SIGNIFICANT CHANGE UP (ref 150–400)
POTASSIUM SERPL-MCNC: 3.5 MMOL/L — SIGNIFICANT CHANGE UP (ref 3.5–5.3)
POTASSIUM SERPL-SCNC: 3.5 MMOL/L — SIGNIFICANT CHANGE UP (ref 3.5–5.3)
PROT SERPL-MCNC: 6 G/DL — LOW (ref 6.6–8.7)
PROTHROM AB SERPL-ACNC: 12.5 SEC — SIGNIFICANT CHANGE UP (ref 10.6–13.6)
RBC # BLD: 4.68 M/UL — SIGNIFICANT CHANGE UP (ref 3.8–5.2)
RBC # FLD: 15.9 % — HIGH (ref 10.3–14.5)
SARS-COV-2 RNA SPEC QL NAA+PROBE: SIGNIFICANT CHANGE UP
SODIUM SERPL-SCNC: 138 MMOL/L — SIGNIFICANT CHANGE UP (ref 135–145)
WBC # BLD: 8.14 K/UL — SIGNIFICANT CHANGE UP (ref 3.8–10.5)
WBC # FLD AUTO: 8.14 K/UL — SIGNIFICANT CHANGE UP (ref 3.8–10.5)

## 2022-01-26 PROCEDURE — 99223 1ST HOSP IP/OBS HIGH 75: CPT

## 2022-01-26 PROCEDURE — 76775 US EXAM ABDO BACK WALL LIM: CPT | Mod: 26

## 2022-01-26 PROCEDURE — 71045 X-RAY EXAM CHEST 1 VIEW: CPT | Mod: 26

## 2022-01-26 PROCEDURE — 74018 RADEX ABDOMEN 1 VIEW: CPT | Mod: 26

## 2022-01-26 PROCEDURE — 99285 EMERGENCY DEPT VISIT HI MDM: CPT

## 2022-01-26 PROCEDURE — 93010 ELECTROCARDIOGRAM REPORT: CPT

## 2022-01-26 RX ORDER — POLYETHYLENE GLYCOL 3350 17 G/17G
17 POWDER, FOR SOLUTION ORAL DAILY
Refills: 0 | Status: DISCONTINUED | OUTPATIENT
Start: 2022-01-26 | End: 2022-02-02

## 2022-01-26 RX ORDER — CHOLECALCIFEROL (VITAMIN D3) 125 MCG
5000 CAPSULE ORAL ONCE
Refills: 0 | Status: DISCONTINUED | OUTPATIENT
Start: 2022-01-26 | End: 2022-01-26

## 2022-01-26 RX ORDER — CEFTRIAXONE 500 MG/1
1000 INJECTION, POWDER, FOR SOLUTION INTRAMUSCULAR; INTRAVENOUS EVERY 24 HOURS
Refills: 0 | Status: DISCONTINUED | OUTPATIENT
Start: 2022-01-27 | End: 2022-02-01

## 2022-01-26 RX ORDER — SODIUM CHLORIDE 9 MG/ML
1000 INJECTION INTRAMUSCULAR; INTRAVENOUS; SUBCUTANEOUS ONCE
Refills: 0 | Status: COMPLETED | OUTPATIENT
Start: 2022-01-26 | End: 2022-01-26

## 2022-01-26 RX ORDER — HYDROCORTISONE 20 MG
50 TABLET ORAL EVERY 8 HOURS
Refills: 0 | Status: DISCONTINUED | OUTPATIENT
Start: 2022-01-26 | End: 2022-01-29

## 2022-01-26 RX ORDER — HYDROCORTISONE 20 MG
100 TABLET ORAL ONCE
Refills: 0 | Status: COMPLETED | OUTPATIENT
Start: 2022-01-26 | End: 2022-01-26

## 2022-01-26 RX ORDER — ERGOCALCIFEROL 1.25 MG/1
50000 CAPSULE ORAL ONCE
Refills: 0 | Status: COMPLETED | OUTPATIENT
Start: 2022-01-26 | End: 2022-01-26

## 2022-01-26 RX ORDER — SENNA PLUS 8.6 MG/1
2 TABLET ORAL AT BEDTIME
Refills: 0 | Status: DISCONTINUED | OUTPATIENT
Start: 2022-01-26 | End: 2022-02-02

## 2022-01-26 RX ORDER — LEVOTHYROXINE SODIUM 125 MCG
88 TABLET ORAL DAILY
Refills: 0 | Status: DISCONTINUED | OUTPATIENT
Start: 2022-01-26 | End: 2022-02-02

## 2022-01-26 RX ORDER — CEFTRIAXONE 500 MG/1
1000 INJECTION, POWDER, FOR SOLUTION INTRAMUSCULAR; INTRAVENOUS ONCE
Refills: 0 | Status: COMPLETED | OUTPATIENT
Start: 2022-01-26 | End: 2022-01-26

## 2022-01-26 RX ORDER — ACETAMINOPHEN 500 MG
650 TABLET ORAL ONCE
Refills: 0 | Status: COMPLETED | OUTPATIENT
Start: 2022-01-26 | End: 2022-01-26

## 2022-01-26 RX ORDER — ACETAMINOPHEN 500 MG
650 TABLET ORAL EVERY 6 HOURS
Refills: 0 | Status: DISCONTINUED | OUTPATIENT
Start: 2022-01-26 | End: 2022-02-02

## 2022-01-26 RX ORDER — CEFTRIAXONE 500 MG/1
INJECTION, POWDER, FOR SOLUTION INTRAMUSCULAR; INTRAVENOUS
Refills: 0 | Status: DISCONTINUED | OUTPATIENT
Start: 2022-01-26 | End: 2022-02-01

## 2022-01-26 RX ORDER — ATORVASTATIN CALCIUM 80 MG/1
10 TABLET, FILM COATED ORAL AT BEDTIME
Refills: 0 | Status: DISCONTINUED | OUTPATIENT
Start: 2022-01-26 | End: 2022-02-02

## 2022-01-26 RX ORDER — ONDANSETRON 8 MG/1
4 TABLET, FILM COATED ORAL EVERY 6 HOURS
Refills: 0 | Status: DISCONTINUED | OUTPATIENT
Start: 2022-01-26 | End: 2022-02-02

## 2022-01-26 RX ORDER — METOPROLOL TARTRATE 50 MG
0.5 TABLET ORAL
Qty: 0 | Refills: 0 | DISCHARGE

## 2022-01-26 RX ORDER — CHOLESTYRAMINE 4 G/9G
0 POWDER, FOR SUSPENSION ORAL
Qty: 0 | Refills: 0 | DISCHARGE

## 2022-01-26 RX ORDER — ONDANSETRON 8 MG/1
4 TABLET, FILM COATED ORAL ONCE
Refills: 0 | Status: COMPLETED | OUTPATIENT
Start: 2022-01-26 | End: 2022-01-26

## 2022-01-26 RX ORDER — SODIUM CHLORIDE 9 MG/ML
1000 INJECTION, SOLUTION INTRAVENOUS
Refills: 0 | Status: DISCONTINUED | OUTPATIENT
Start: 2022-01-26 | End: 2022-01-31

## 2022-01-26 RX ORDER — SODIUM CHLORIDE 9 MG/ML
500 INJECTION, SOLUTION INTRAVENOUS
Refills: 0 | Status: COMPLETED | OUTPATIENT
Start: 2022-01-26 | End: 2022-01-26

## 2022-01-26 RX ORDER — LACTULOSE 10 G/15ML
20 SOLUTION ORAL
Refills: 0 | Status: DISCONTINUED | OUTPATIENT
Start: 2022-01-26 | End: 2022-02-02

## 2022-01-26 RX ORDER — DIPHENHYDRAMINE HCL 50 MG
0 CAPSULE ORAL
Qty: 0 | Refills: 0 | DISCHARGE

## 2022-01-26 RX ORDER — HEPARIN SODIUM 5000 [USP'U]/ML
5000 INJECTION INTRAVENOUS; SUBCUTANEOUS EVERY 8 HOURS
Refills: 0 | Status: DISCONTINUED | OUTPATIENT
Start: 2022-01-26 | End: 2022-02-02

## 2022-01-26 RX ADMIN — ATORVASTATIN CALCIUM 10 MILLIGRAM(S): 80 TABLET, FILM COATED ORAL at 22:18

## 2022-01-26 RX ADMIN — POLYETHYLENE GLYCOL 3350 17 GRAM(S): 17 POWDER, FOR SOLUTION ORAL at 19:29

## 2022-01-26 RX ADMIN — SODIUM CHLORIDE 110 MILLILITER(S): 9 INJECTION, SOLUTION INTRAVENOUS at 16:40

## 2022-01-26 RX ADMIN — ERGOCALCIFEROL 50000 UNIT(S): 1.25 CAPSULE ORAL at 22:42

## 2022-01-26 RX ADMIN — HEPARIN SODIUM 5000 UNIT(S): 5000 INJECTION INTRAVENOUS; SUBCUTANEOUS at 23:44

## 2022-01-26 RX ADMIN — SODIUM CHLORIDE 1000 MILLILITER(S): 9 INJECTION INTRAMUSCULAR; INTRAVENOUS; SUBCUTANEOUS at 16:40

## 2022-01-26 RX ADMIN — SODIUM CHLORIDE 1000 MILLILITER(S): 9 INJECTION INTRAMUSCULAR; INTRAVENOUS; SUBCUTANEOUS at 16:20

## 2022-01-26 RX ADMIN — SODIUM CHLORIDE 110 MILLILITER(S): 9 INJECTION, SOLUTION INTRAVENOUS at 21:23

## 2022-01-26 RX ADMIN — SODIUM CHLORIDE 1000 MILLILITER(S): 9 INJECTION INTRAMUSCULAR; INTRAVENOUS; SUBCUTANEOUS at 15:36

## 2022-01-26 RX ADMIN — Medication 650 MILLIGRAM(S): at 22:43

## 2022-01-26 RX ADMIN — Medication 100 MILLIGRAM(S): at 22:42

## 2022-01-26 RX ADMIN — Medication 650 MILLIGRAM(S): at 22:18

## 2022-01-26 RX ADMIN — CEFTRIAXONE 100 MILLIGRAM(S): 500 INJECTION, POWDER, FOR SOLUTION INTRAMUSCULAR; INTRAVENOUS at 21:18

## 2022-01-26 RX ADMIN — LACTULOSE 20 GRAM(S): 10 SOLUTION ORAL at 19:29

## 2022-01-26 RX ADMIN — SENNA PLUS 2 TABLET(S): 8.6 TABLET ORAL at 22:19

## 2022-01-26 RX ADMIN — ONDANSETRON 4 MILLIGRAM(S): 8 TABLET, FILM COATED ORAL at 15:10

## 2022-01-26 RX ADMIN — SODIUM CHLORIDE 500 MILLILITER(S): 9 INJECTION, SOLUTION INTRAVENOUS at 21:23

## 2022-01-26 RX ADMIN — SODIUM CHLORIDE 1000 MILLILITER(S): 9 INJECTION INTRAMUSCULAR; INTRAVENOUS; SUBCUTANEOUS at 14:10

## 2022-01-26 NOTE — CONSULT NOTE ADULT - SUBJECTIVE AND OBJECTIVE BOX
SURGICAL ONCOLOGY CONSULT     HPI: Ms. Stevens is a 70 yo F with a hx of L adrenal mass (initially noted to have Cushing syndrome, pre-op cortisol wnl),  s/p elective robotic L adrenalectomy on 1/3/22 with Dr. Brown that was well tolerated. Patient presents now to the ER c/o obstipation since her procedure. Patient was seen in the office last week and recommended to take daily Miralax which she has done ever other day. Not tolerating much solid meals but tolerating fluids. Voiding adequately. Denies fever, chills, chest pain, and sob.     ROS: 10-system review is otherwise negative except HPI above.      PAST MEDICAL & SURGICAL HISTORY:  Benign neoplasm of unspecified adrenal gland  Hypertension  Hyperlipidemia  Hypothyroidism  Depression  COVID-19 vaccine series completed  At risk for sleep apnea  Bang score 3  H/O: hysterectomy  S/P cataract surgery  History of tonsillectomy    FAMILY HISTORY:  FH: heart disease (Mother, Grandparent)  FH: diabetes mellitus (Mother, Grandparent)    SOCIAL HISTORY:  Denies any toxic habits    ALLERGIES: NKA iodinated radiocontrast agents (Anaphylaxis; Angioedema)  sulfa drugs (Unknown)  Tequin (Unknown)      HOME MEDICATIONS:   amLODIPine 5 mg oral tablet: 1 tab(s) orally once a day (03 Jan 2022 11:58)  Benadryl 25 mg oral capsule:  (03 Jan 2022 11:58)  cholestyramine: orally once a day (03 Jan 2022 11:58)  cholestyramine 4 g/5 g oral powder for reconstitution:  (03 Jan 2022 11:58)  losartan 100 mg oral tablet: 1 tab(s) orally once a day (03 Jan 2022 11:58)  metoprolol succinate 100 mg oral tablet, extended release: 0.5 tab(s) orally once a day (03 Jan 2022 11:58)  pravastatin 40 mg oral tablet: 1 tab(s) orally once a day (03 Jan 2022 11:58)  Synthroid 88 mcg (0.088 mg) oral tablet: 1 tab(s) orally once a day (03 Jan 2022 11:58)  Vitamin D3 1250 mcg (50,000 intl units) oral capsule: 1 cap(s) orally once a week (03 Jan 2022 11:58)      --------------------------------------------------------------------------------------------    PHYSICAL EXAM:   General: NAD, Lying in bed comfortably  Neuro: A+Ox3  HEENT: EOMI, PERRLA, MMM  Cardio: RRR  Resp: Non labored breathing on RA  GI/Abd: Soft, ND, no rebound/guarding, no masses palpated. Mildly tender to palpation in LLQ.   Rectal exam: No stool burned palpated. No blood on glove.   Vascular: All 4 extremities warm and well perfused.   Pelvis: stable  Musculoskeletal: All 4 extremities moving spontaneously, no limitations, no spinal tenderness.  --------------------------------------------------------------------------------------------    LABS                 13.8   8.14   )----------(  345       ( 26 Jan 2022 14:11 )               42.1      138    |  104    |  14.7   ----------------------------<  104        ( 26 Jan 2022 14:11 )  3.5     |  19.0   |  2.37     Ca    8.6        ( 26 Jan 2022 14:11 )    TPro  6.0    /  Alb  3.2    /  TBili  0.5    /  DBili  x      /  AST  43     /  ALT  33     /  AlkPhos  117    ( 26 Jan 2022 14:11 )    LIVER FUNCTIONS - ( 26 Jan 2022 14:11 )  Alb: 3.2 g/dL / Pro: 6.0 g/dL / ALK PHOS: 117 U/L / ALT: 33 U/L / AST: 43 U/L / GGT: x           PT/INR -  12.5 sec / 1.08 ratio   ( 26 Jan 2022 14:11 )       PTT -  30.2 sec   ( 26 Jan 2022 14:11 )  CAPILLARY BLOOD GLUCOSE            14:12 - VBG - pH:       | pCO2:       | pO2:       | Lactate: 2.20     --------------------------------------------------------------------------------------------  IMAGING

## 2022-01-26 NOTE — H&P ADULT - ASSESSMENT
pt. is a 72 y/o Female with a hx of L adrenal mass (initially noted to have Cushing syndrome, pre-op cortisol wnl),  s/p elective robotic L adrenalectomy on 1/3/22 with Dr. Brown that was well tolerated. Patient presents now to the ER  as her BP was low at home 80/40, felt lightheaded, no syncope, pt. also reports lack of appetite, po intake is not good as per pt as she does not feel like eating, no urine infection symptoms + dry heaves, Denies fever, chills. no cough, no sob, no cp. pt. also c/o constipation, and mild pain and fulness in lower abd. area. since her procedure. Patient was seen in the  [facility] surgery office last week and recommended to take daily Miralax which she has done every other day. Voiding adequately. pt. is seen by surgery team in the ER. PTt. noted to have georgi Cr 2.37, bp responding to iv fluids. pt. is on metoprolol 100 mg in am and 50 mg in pm, also on losartan 100 mg daily, and amlodipine 5 mg daily. As per pt. she was cleared by her cardiologist Dr. Tran for her recent surgery on 01/3/22.     -Hypotension unspecified type.     - Constipation.     - S/P left adrenalectomy     - hypothyroidism unspecified type    - hyperlipidemia unspecified type.  pt. is a 72 y/o Female with a hx of L adrenal mass (initially noted to have Cushing syndrome, pre-op cortisol wnl),  s/p elective robotic L adrenalectomy on 1/3/22 with Dr. Brown that was well tolerated. Patient presents now to the ER  as her BP was low at home 80/40, felt lightheaded, no syncope, pt. also reports lack of appetite, po intake is not good as per pt as she does not feel like eating, no urine infection symptoms + dry heaves, Denies fever, chills. no cough, no sob, no cp. pt. also c/o constipation, and mild pain and fulness in lower abd. area. since her procedure. Patient was seen in the  [facility] surgery office last week and recommended to take daily Miralax which she has done every other day. Voiding adequately. pt. is seen by surgery team in the ER. PTt. noted to have mildred Cr 2.37, bp responding to iv fluids. pt. is on metoprolol 100 mg in am and 50 mg in pm, also on losartan 100 mg daily, and amlodipine 5 mg daily. As per pt. she was cleared by her cardiologist Dr. Tran for her recent surgery on 01/3/22.     -Hypotension unspecified type. possible multifactorial, medication related ? post adrenalectomy ? infection ? no elevated wbc, rectal temp 100.1, u/a pending ? will impirically treat with rocephin, follow all cultures. pt. bp has responded to iv fluid support.   later pt's bp was low again i have spoken to endocrinologist on call and plan is to give hydrocortisone 100 mg iv x 1 and then 50 mg iv q 8hrs after cortisol and acth are drawn, ordered as stat and spoke to lab so can be drawn timely. pt. is mentating fine, no dizzines, no cp, no sob. pt's bp meds on hold for now but will need bp on board once bp is stable, as per endocrinologist.     - MILDRED, renal sono no acute findings, will hold losartan and avoid any other nephrotoxic meds. iv hydration and close f/u on labs, [facility] consult called.    - Constipation, surgery team following, enema has been ordered by ER physician, will keep on lactulose, miralax and senna, will request GI consult.     - S/P left adrenalectomy , endocrinology follow up, surgery on board. hydrocortisone.    - hypothyroidism unspecified type, continue synthroid. will check TFT.     - hyperlipidemia unspecified type. continue statin.  pt. is a 70 y/o Female with a hx of L adrenal mass (initially noted to have Cushing syndrome, pre-op cortisol wnl),  s/p elective robotic L adrenalectomy on 1/3/22 with Dr. Brown that was well tolerated. Patient presents now to the ER  as her BP was low at home 80/40, felt lightheaded, no syncope, pt. also reports lack of appetite, po intake is not good as per pt as she does not feel like eating, no urine infection symptoms + dry heaves, Denies fever, chills. no cough, no sob, no cp. pt. also c/o constipation, and mild pain and fulness in lower abd. area. since her procedure. Patient was seen in the  [facility] surgery office last week and recommended to take daily Miralax which she has done every other day. Voiding adequately. pt. is seen by surgery team in the ER. PTt. noted to have mildred Cr 2.37, bp responding to iv fluids. pt. is on metoprolol 100 mg in am and 50 mg in pm, also on losartan 100 mg daily, and amlodipine 5 mg daily. As per pt. she was cleared by her cardiologist Dr. Tran for her recent surgery on 01/3/22.     -Hypotension unspecified type. possible multifactorial, medication related ? post adrenalectomy ? infection ? no elevated wbc, rectal temp 100.1, u/a pending ? will impirically treat with rocephin, follow all cultures. pt. bp has responded to iv fluid support.   later pt's bp was low again i have spoken to endocrinologist on call and plan is to give hydrocortisone 100 mg iv x 1 and then 50 mg iv q 8hrs after cortisol and acth are drawn, ordered as stat and spoke to lab so can be drawn timely. pt. is mentating fine, no dizzines, no cp, no sob. pt's bp meds on hold for now but will need bp on board once bp is stable, as per endocrinologist.     - MILDRED, renal sono no acute findings, will hold losartan and avoid any other nephrotoxic meds. iv hydration and close f/u on labs, [facility] consult called.    - Constipation, surgery team following, enema has been ordered by ER physician, will keep on lactulose, miralax and senna.     - S/P left adrenalectomy , endocrinology follow up, surgery on board. hydrocortisone.    - hypothyroidism unspecified type, continue synthroid. will check TFT.     - hyperlipidemia unspecified type. continue statin.  pt. is a 70 y/o Female with a hx of L adrenal mass (initially noted to have Cushing syndrome, pre-op cortisol wnl),  s/p elective robotic L adrenalectomy on 1/3/22 with Dr. Brown that was well tolerated. Patient presents now to the ER  as her BP was low at home 80/40, felt lightheaded, no syncope, pt. also reports lack of appetite, po intake is not good as per pt as she does not feel like eating, no urine infection symptoms + dry heaves, Denies fever, chills. no cough, no sob, no cp. pt. also c/o constipation, and mild pain and fulness in lower abd. area. since her procedure. Patient was seen in the  [facility] surgery office last week and recommended to take daily Miralax which she has done every other day. Voiding adequately. pt. is seen by surgery team in the ER. PTt. noted to have mildred Cr 2.37, bp responding to iv fluids. pt. is on metoprolol 100 mg in am and 50 mg in pm, also on losartan 100 mg daily, and amlodipine 5 mg daily. As per pt. she was cleared by her cardiologist Dr. Tran for her recent surgery on 01/3/22.     -Hypotension unspecified type. possible multifactorial, medication related ? post adrenalectomy ? infection ? no elevated wbc, rectal temp 100.1, u/a pending ? will impirically treat with rocephin, follow all cultures. pt. bp has responded to iv fluid support.   later pt's bp was low again i have spoken to endocrinologist on call and plan is to give hydrocortisone 100 mg iv x 1 and then 50 mg iv q 8hrs after cortisol and acth are drawn, ordered as stat and spoke to lab so can be drawn timely. pt. is mentating fine, no dizzines, no cp, no sob. pt's bp meds on hold for now but will need bp on board once bp is stable, as per endocrinologist.     - MILDRED, renal sono no acute findings, will hold losartan and avoid any other nephrotoxic meds. iv hydration and close f/u on labs, [facility] consult called.    - Constipation, unspecified type, surgery team following, enema has been ordered by ER physician, will keep on lactulose, miralax and senna.     - S/P left adrenalectomy , endocrinology follow up, surgery on board. hydrocortisone.    - hypothyroidism unspecified type, continue synthroid. will check TFT.     - hyperlipidemia unspecified type. continue statin.

## 2022-01-26 NOTE — H&P ADULT - HISTORY OF PRESENT ILLNESS
pt. is a 70 y/o Female with a hx of L adrenal mass (initially noted to have Cushing syndrome, pre-op cortisol wnl),  s/p elective robotic L adrenalectomy on 1/3/22 with Dr. Brown that was well tolerated. Patient presents now to the ER  as her BP was low at home 80/40, felt lightheaded, no syncope, pt. also reports lack of appetite, po intake is not good as per pt as she does not feel like eating, no urine infection sysmptoms, + dry heaves, Denies fever, chills. no cough, no sob, no cp. pt. also c/o constipation, and mild pain and fulness in lower abd. area. since her procedure. Patient was seen in the  [facility] surgery office last week and recommended to take daily Miralax which she has done every other day. Voiding adequately. pt. is seen by surgery team in the ER. PTt. noted to have georgi Cr 2.37, bp responding to iv fluids. pt. is on metoprolol 100 mg in am and 50 mg in pm, also on losartan 100 mg daily, and amlodipine 5 mg daily.  pt. is a 72 y/o Female with a hx of L adrenal mass (initially noted to have Cushing syndrome, pre-op cortisol wnl),  s/p elective robotic L adrenalectomy on 1/3/22 with Dr. Brown that was well tolerated. Patient presents now to the ER  as her BP was low at home 80/40, felt lightheaded, no syncope, pt. also reports lack of appetite, po intake is not good as per pt as she does not feel like eating, no urine infection sysmptoms, + dry heaves, Denies fever, chills. no cough, no sob, no cp. pt. also c/o constipation, and mild pain and fulness in lower abd. area. since her procedure. Patient was seen in the  [facility] surgery office last week and recommended to take daily Miralax which she has done every other day. Voiding adequately. pt. is seen by surgery team in the ER. PTt. noted to have georgi Cr 2.37, bp responding to iv fluids. pt. is on metoprolol 100 mg in am and 50 mg in pm, also on losartan 100 mg daily, and amlodipine 5 mg daily. As per pt. she was cleared by her cardiologist Dr. Tran for her recent surgery on 01/3/22.  pt. is a 70 y/o Female with a hx of L adrenal mass (initially noted to have Cushing syndrome, pre-op cortisol wnl),  s/p elective robotic L adrenalectomy on 1/3/22 with Dr. Brown that was well tolerated. Patient presents now to the ER  as her BP was low at home 80/40, felt lightheaded, no syncope, pt. also reports lack of appetite, po intake is not good as per pt as she does not feel like eating, no urine infection symptoms, + dry heaves, Denies fever, chills. no cough, no sob, no cp. pt. also c/o constipation, and mild pain and fulness in lower abd. area. since her procedure. Patient was seen in the  [facility] surgery office last week and recommended to take daily Miralax which she has done every other day. Voiding adequately. pt. is seen by surgery team in the ER. PTt. noted to have georgi Cr 2.37, bp responding to iv fluids. pt. is on metoprolol 100 mg in am and 50 mg in pm, also on losartan 100 mg daily, and amlodipine 5 mg daily. As per pt. she was cleared by her cardiologist Dr. Tran for her recent surgery on 01/3/22.

## 2022-01-26 NOTE — CONSULT NOTE ADULT - ASSESSMENT
ASSESSMENT: Patient is a 71y old female with constipation and MILDRED.     PLAN:    - Recommend medical evaluation  - Will f/u KUB  - Will f/u renal US and UA  - IVF for MILDRED  - If KUB demonstrated constipation, fleet enemas and Golytely   - No need for surgical intervention at this moment.   - Plan discussed with Attending, Dr. Ramon

## 2022-01-26 NOTE — H&P ADULT - NSICDXPASTSURGICALHX_GEN_ALL_CORE_FT
PAST SURGICAL HISTORY:  H/O total adrenalectomy left    H/O: hysterectomy     History of tonsillectomy     S/P cataract surgery

## 2022-01-26 NOTE — ED ADULT NURSE NOTE - NSIMPLEMENTINTERV_GEN_ALL_ED
Implemented All Fall with Harm Risk Interventions:  [place] to call system. Call bell, personal items and telephone within reach. Instruct patient to call for assistance. Room bathroom lighting operational. Non-slip footwear when patient is off stretcher. Physically safe environment: no spills, clutter or unnecessary equipment. Stretcher in lowest position, wheels locked, appropriate side rails in place. Provide visual cue, wrist band, yellow gown, etc. Monitor gait and stability. Monitor for mental status changes and reorient to person, place, and time. Review medications for side effects contributing to fall risk. Reinforce activity limits and safety measures with patient and family. Provide visual clues: red socks.

## 2022-01-26 NOTE — ED ADULT NURSE NOTE - OBJECTIVE STATEMENT
70 y/o female alert and oriented x3, endorsed from Area A. Complaint of nausea, with low blood pressure. As per previous covering RN lab specimens obtained, saline lock placed to right AC. IV fluids initiated. B/P presently 107/57, patient in no obvious physical distress. Pending further MD jensen.

## 2022-01-26 NOTE — ED ADULT TRIAGE NOTE - CHIEF COMPLAINT QUOTE
Pt c/o hypotension, dizziness, light headedness. Denies cp.  Pt states  BP at home was 80/40.  Unable to obtain BP in triage.  Pt moved to critical care.  Pt has left adrenal gland removed on 1/3/21.

## 2022-01-26 NOTE — H&P ADULT - NSHPPHYSICALEXAM_GEN_ALL_CORE
Vital Signs Last 24 Hrs  T(C): 36.5 (26 Jan 2022 13:30), Max: 36.5 (26 Jan 2022 13:30)  T(F): 97.7 (26 Jan 2022 13:30), Max: 97.7 (26 Jan 2022 13:30)  HR: 93 (26 Jan 2022 17:45) (91 - 103)  BP: 107/52 (26 Jan 2022 17:45) (87/57 - 118/61)  BP(mean): --  RR: 20 (26 Jan 2022 17:45) (18 - 22)  SpO2: 98% (26 Jan 2022 17:45) (96% - 98%)    General: pt. lying in bed not in distress.   HEENT: AT, NC. PERRL. intact EOM. oral mucosa somewhat dry, no throat erythema or exudate.   Neck: supple. no JVD.   Chest: CTA bilaterally  Heart: S1,S2. RRR. no heart murmur. no edema.   Abdomen: soft. obese+ BS. mild tenderness in lower abd. area, no RT, no guarding.  rectal : deferred by pt, had one by surgery team and no stool in rectal vault or rectal blood reported.  Ext: no calf tenderness, distal pulses intact. moves all ect. independently.  Neuro: AAO x3. no focal weakness. no speech disorder, cns ii to xii intact.  Skin: warm and dry, no diaphoresis, no pallor.  lymphatic system : no lymphadenopathy noted.   psych : normal affect, no si/hi.

## 2022-01-26 NOTE — ED PROVIDER NOTE - OBJECTIVE STATEMENT
72 y/o female s/o adrenal gland resection   states little to no BM since c/o nausea 70 y/o female s/o adrenal gland resection   states little to no BM since c/o nausea, dizziness , decreased PO intake   and abdominal discomfort   pt was seen by Dr Mondragon two days ago

## 2022-01-27 LAB
ACTH SER-ACNC: 27.8 PG/ML — SIGNIFICANT CHANGE UP (ref 7.2–63.3)
ALBUMIN SERPL ELPH-MCNC: 3 G/DL — LOW (ref 3.3–5.2)
ALP SERPL-CCNC: 103 U/L — SIGNIFICANT CHANGE UP (ref 40–120)
ALT FLD-CCNC: 27 U/L — SIGNIFICANT CHANGE UP
ANION GAP SERPL CALC-SCNC: 17 MMOL/L — SIGNIFICANT CHANGE UP (ref 5–17)
APPEARANCE UR: ABNORMAL
APPEARANCE UR: CLEAR — SIGNIFICANT CHANGE UP
AST SERPL-CCNC: 31 U/L — SIGNIFICANT CHANGE UP
BACTERIA # UR AUTO: ABNORMAL
BACTERIA # UR AUTO: ABNORMAL
BASOPHILS # BLD AUTO: 0.04 K/UL — SIGNIFICANT CHANGE UP (ref 0–0.2)
BASOPHILS NFR BLD AUTO: 0.4 % — SIGNIFICANT CHANGE UP (ref 0–2)
BILIRUB SERPL-MCNC: 0.4 MG/DL — SIGNIFICANT CHANGE UP (ref 0.4–2)
BILIRUB UR-MCNC: NEGATIVE — SIGNIFICANT CHANGE UP
BILIRUB UR-MCNC: NEGATIVE — SIGNIFICANT CHANGE UP
BUN SERPL-MCNC: 14.6 MG/DL — SIGNIFICANT CHANGE UP (ref 8–20)
CALCIUM SERPL-MCNC: 8.1 MG/DL — LOW (ref 8.6–10.2)
CHLORIDE SERPL-SCNC: 105 MMOL/L — SIGNIFICANT CHANGE UP (ref 98–107)
CO2 SERPL-SCNC: 17 MMOL/L — LOW (ref 22–29)
COLOR SPEC: YELLOW — SIGNIFICANT CHANGE UP
COLOR SPEC: YELLOW — SIGNIFICANT CHANGE UP
CREAT ?TM UR-MCNC: 251 MG/DL — SIGNIFICANT CHANGE UP
CREAT SERPL-MCNC: 2.25 MG/DL — HIGH (ref 0.5–1.3)
DIFF PNL FLD: ABNORMAL
DIFF PNL FLD: ABNORMAL
EOSINOPHIL # BLD AUTO: 0.04 K/UL — SIGNIFICANT CHANGE UP (ref 0–0.5)
EOSINOPHIL NFR BLD AUTO: 0.4 % — SIGNIFICANT CHANGE UP (ref 0–6)
EPI CELLS # UR: SIGNIFICANT CHANGE UP
EPI CELLS # UR: SIGNIFICANT CHANGE UP
GLUCOSE SERPL-MCNC: 128 MG/DL — HIGH (ref 70–99)
GLUCOSE UR QL: NEGATIVE MG/DL — SIGNIFICANT CHANGE UP
GLUCOSE UR QL: NEGATIVE MG/DL — SIGNIFICANT CHANGE UP
HCT VFR BLD CALC: 40.8 % — SIGNIFICANT CHANGE UP (ref 34.5–45)
HCV AB S/CO SERPL IA: 0.16 S/CO — SIGNIFICANT CHANGE UP (ref 0–0.99)
HCV AB SERPL-IMP: SIGNIFICANT CHANGE UP
HGB BLD-MCNC: 13 G/DL — SIGNIFICANT CHANGE UP (ref 11.5–15.5)
IMM GRANULOCYTES NFR BLD AUTO: 0.4 % — SIGNIFICANT CHANGE UP (ref 0–1.5)
KETONES UR-MCNC: ABNORMAL
KETONES UR-MCNC: ABNORMAL
LEUKOCYTE ESTERASE UR-ACNC: ABNORMAL
LEUKOCYTE ESTERASE UR-ACNC: ABNORMAL
LYMPHOCYTES # BLD AUTO: 0.73 K/UL — LOW (ref 1–3.3)
LYMPHOCYTES # BLD AUTO: 8 % — LOW (ref 13–44)
MAGNESIUM SERPL-MCNC: 1.4 MG/DL — LOW (ref 1.6–2.6)
MCHC RBC-ENTMCNC: 29.2 PG — SIGNIFICANT CHANGE UP (ref 27–34)
MCHC RBC-ENTMCNC: 31.9 GM/DL — LOW (ref 32–36)
MCV RBC AUTO: 91.7 FL — SIGNIFICANT CHANGE UP (ref 80–100)
MONOCYTES # BLD AUTO: 0.32 K/UL — SIGNIFICANT CHANGE UP (ref 0–0.9)
MONOCYTES NFR BLD AUTO: 3.5 % — SIGNIFICANT CHANGE UP (ref 2–14)
NEUTROPHILS # BLD AUTO: 7.92 K/UL — HIGH (ref 1.8–7.4)
NEUTROPHILS NFR BLD AUTO: 87.3 % — HIGH (ref 43–77)
NITRITE UR-MCNC: NEGATIVE — SIGNIFICANT CHANGE UP
NITRITE UR-MCNC: NEGATIVE — SIGNIFICANT CHANGE UP
OSMOLALITY UR: 310 MOSM/KG — SIGNIFICANT CHANGE UP (ref 300–1000)
PH UR: 6 — SIGNIFICANT CHANGE UP (ref 5–8)
PH UR: 6 — SIGNIFICANT CHANGE UP (ref 5–8)
PLATELET # BLD AUTO: 274 K/UL — SIGNIFICANT CHANGE UP (ref 150–400)
POTASSIUM SERPL-MCNC: 3.4 MMOL/L — LOW (ref 3.5–5.3)
POTASSIUM SERPL-SCNC: 3.4 MMOL/L — LOW (ref 3.5–5.3)
POTASSIUM UR-SCNC: 28 MMOL/L — SIGNIFICANT CHANGE UP
PROT ?TM UR-MCNC: 38 MG/DL — HIGH (ref 0–12)
PROT SERPL-MCNC: 5.5 G/DL — LOW (ref 6.6–8.7)
PROT UR-MCNC: 100 MG/DL
PROT UR-MCNC: 100 MG/DL
PROT/CREAT UR-RTO: 0.2 RATIO — SIGNIFICANT CHANGE UP
RBC # BLD: 4.45 M/UL — SIGNIFICANT CHANGE UP (ref 3.8–5.2)
RBC # FLD: 15.9 % — HIGH (ref 10.3–14.5)
RBC CASTS # UR COMP ASSIST: ABNORMAL /HPF (ref 0–4)
RBC CASTS # UR COMP ASSIST: SIGNIFICANT CHANGE UP /HPF (ref 0–4)
SODIUM SERPL-SCNC: 139 MMOL/L — SIGNIFICANT CHANGE UP (ref 135–145)
SODIUM UR-SCNC: <30 MMOL/L — SIGNIFICANT CHANGE UP
SP GR SPEC: 1.02 — SIGNIFICANT CHANGE UP (ref 1.01–1.02)
SP GR SPEC: 1.02 — SIGNIFICANT CHANGE UP (ref 1.01–1.02)
T3 SERPL-MCNC: 125 NG/DL — SIGNIFICANT CHANGE UP (ref 80–200)
T4 AB SER-ACNC: 7 UG/DL — SIGNIFICANT CHANGE UP (ref 4.5–12)
TSH SERPL-MCNC: 1.81 UIU/ML — SIGNIFICANT CHANGE UP (ref 0.27–4.2)
UROBILINOGEN FLD QL: NEGATIVE MG/DL — SIGNIFICANT CHANGE UP
UROBILINOGEN FLD QL: NEGATIVE MG/DL — SIGNIFICANT CHANGE UP
WBC # BLD: 9.09 K/UL — SIGNIFICANT CHANGE UP (ref 3.8–10.5)
WBC # FLD AUTO: 9.09 K/UL — SIGNIFICANT CHANGE UP (ref 3.8–10.5)
WBC UR QL: >50 /HPF (ref 0–5)
WBC UR QL: >50 /HPF (ref 0–5)

## 2022-01-27 PROCEDURE — 99359 PROLONG SERV W/O CONTACT ADD: CPT

## 2022-01-27 PROCEDURE — 99223 1ST HOSP IP/OBS HIGH 75: CPT

## 2022-01-27 PROCEDURE — 99358 PROLONG SERVICE W/O CONTACT: CPT

## 2022-01-27 PROCEDURE — 99232 SBSQ HOSP IP/OBS MODERATE 35: CPT

## 2022-01-27 RX ORDER — POTASSIUM CHLORIDE 20 MEQ
40 PACKET (EA) ORAL ONCE
Refills: 0 | Status: DISCONTINUED | OUTPATIENT
Start: 2022-01-27 | End: 2022-01-27

## 2022-01-27 RX ORDER — MAGNESIUM SULFATE 500 MG/ML
2 VIAL (ML) INJECTION
Refills: 0 | Status: COMPLETED | OUTPATIENT
Start: 2022-01-27 | End: 2022-01-27

## 2022-01-27 RX ORDER — OXYCODONE HYDROCHLORIDE 5 MG/1
5 TABLET ORAL EVERY 6 HOURS
Refills: 0 | Status: DISCONTINUED | OUTPATIENT
Start: 2022-01-27 | End: 2022-02-02

## 2022-01-27 RX ORDER — POTASSIUM CHLORIDE 20 MEQ
40 PACKET (EA) ORAL ONCE
Refills: 0 | Status: COMPLETED | OUTPATIENT
Start: 2022-01-27 | End: 2022-01-27

## 2022-01-27 RX ORDER — ERGOCALCIFEROL 1.25 MG/1
50000 CAPSULE ORAL ONCE
Refills: 0 | Status: COMPLETED | OUTPATIENT
Start: 2022-01-27 | End: 2022-01-27

## 2022-01-27 RX ORDER — SODIUM BICARBONATE 1 MEQ/ML
0.11 SYRINGE (ML) INTRAVENOUS
Qty: 75 | Refills: 0 | Status: DISCONTINUED | OUTPATIENT
Start: 2022-01-27 | End: 2022-01-29

## 2022-01-27 RX ORDER — MIDODRINE HYDROCHLORIDE 2.5 MG/1
5 TABLET ORAL THREE TIMES A DAY
Refills: 0 | Status: DISCONTINUED | OUTPATIENT
Start: 2022-01-27 | End: 2022-01-29

## 2022-01-27 RX ORDER — LIDOCAINE 4 G/100G
1 CREAM TOPICAL DAILY
Refills: 0 | Status: DISCONTINUED | OUTPATIENT
Start: 2022-01-27 | End: 2022-02-02

## 2022-01-27 RX ORDER — ALPRAZOLAM 0.25 MG
0.25 TABLET ORAL ONCE
Refills: 0 | Status: DISCONTINUED | OUTPATIENT
Start: 2022-01-27 | End: 2022-01-27

## 2022-01-27 RX ADMIN — HEPARIN SODIUM 5000 UNIT(S): 5000 INJECTION INTRAVENOUS; SUBCUTANEOUS at 21:20

## 2022-01-27 RX ADMIN — POLYETHYLENE GLYCOL 3350 17 GRAM(S): 17 POWDER, FOR SOLUTION ORAL at 13:41

## 2022-01-27 RX ADMIN — Medication 650 MILLIGRAM(S): at 05:29

## 2022-01-27 RX ADMIN — Medication 50 MILLIGRAM(S): at 05:27

## 2022-01-27 RX ADMIN — Medication 0.25 MILLIGRAM(S): at 18:38

## 2022-01-27 RX ADMIN — ERGOCALCIFEROL 50000 UNIT(S): 1.25 CAPSULE ORAL at 18:37

## 2022-01-27 RX ADMIN — Medication 50 MILLIGRAM(S): at 13:43

## 2022-01-27 RX ADMIN — Medication 50 MILLIGRAM(S): at 22:43

## 2022-01-27 RX ADMIN — CEFTRIAXONE 100 MILLIGRAM(S): 500 INJECTION, POWDER, FOR SOLUTION INTRAMUSCULAR; INTRAVENOUS at 21:20

## 2022-01-27 RX ADMIN — HEPARIN SODIUM 5000 UNIT(S): 5000 INJECTION INTRAVENOUS; SUBCUTANEOUS at 05:27

## 2022-01-27 RX ADMIN — Medication 88 MICROGRAM(S): at 05:26

## 2022-01-27 RX ADMIN — HEPARIN SODIUM 5000 UNIT(S): 5000 INJECTION INTRAVENOUS; SUBCUTANEOUS at 13:53

## 2022-01-27 RX ADMIN — LACTULOSE 20 GRAM(S): 10 SOLUTION ORAL at 22:41

## 2022-01-27 RX ADMIN — LIDOCAINE 1 PATCH: 4 CREAM TOPICAL at 17:06

## 2022-01-27 RX ADMIN — LIDOCAINE 1 PATCH: 4 CREAM TOPICAL at 06:01

## 2022-01-27 RX ADMIN — SENNA PLUS 2 TABLET(S): 8.6 TABLET ORAL at 21:25

## 2022-01-27 RX ADMIN — Medication 25 GRAM(S): at 18:38

## 2022-01-27 RX ADMIN — Medication 40 MILLIEQUIVALENT(S): at 18:37

## 2022-01-27 RX ADMIN — Medication 25 GRAM(S): at 21:19

## 2022-01-27 RX ADMIN — ATORVASTATIN CALCIUM 10 MILLIGRAM(S): 80 TABLET, FILM COATED ORAL at 21:20

## 2022-01-27 RX ADMIN — ONDANSETRON 4 MILLIGRAM(S): 8 TABLET, FILM COATED ORAL at 18:55

## 2022-01-27 NOTE — PROGRESS NOTE ADULT - ASSESSMENT
pt. is a 72 y/o Female with a hx of L adrenal mass (initially noted to have Cushing syndrome, pre-op cortisol wnl),  s/p elective robotic L adrenalectomy on 1/3/22 with Dr. Brown that was well tolerated. Patient presents now to the ER  as her BP was low at home 80/40, felt lightheaded, no syncope, pt. also reports lack of appetite, po intake is not good as per pt as she does not feel like eating, no urine infection symptoms + dry heaves, Denies fever, chills. no cough, no sob, no cp. pt. also c/o constipation, and mild pain and fulness in lower abd. area. since her procedure. Patient was seen in the  [facility] surgery office last week and recommended to take daily Miralax which she has done every other day. Voiding adequately. pt. is seen by surgery team in the ER. PTt. noted to have mildred Cr 2.37, bp responding to iv fluids. pt. is on metoprolol 100 mg in am and 50 mg in pm, also on losartan 100 mg daily, and amlodipine 5 mg daily. As per pt. she was cleared by her cardiologist Dr. Tran for her recent surgery on 01/3/22.     #Hypotension unspecified type. possible multifactorial, medication related ? post adrenalectomy ? infection ? no elevated wbc, rectal temp 100.1 on admission   - will start midodtine   - endocrine following - c.w iv steroids    # MILDRED, renal sono no acute findings, will hold losartan and avoid any other nephrotoxic meds.   - nephro following     # Constipation, unspecified type, surgery team following, enema has been ordered by ER physician, will keep on lactulose, miralax and senna.     #S/P left adrenalectomy , endocrinology follow up, surgery on board. hydrocortisone.    # hypothyroidism unspecified type, continue synthroid. will check TFT.     #hyperlipidemia unspecified type. continue statin.     #back pain - lidocaine patch     #pain - oxycodone prn

## 2022-01-27 NOTE — CHART NOTE - NSCHARTNOTEFT_GEN_A_CORE
Contacted by RN for anxiety. Patient admits to feeling anxious, states other patients screaming is putting her on edge. She has previously seen her PCP in the past, believes she had previously taken medication for depression but never for anxiety. She feels it's environment induced.  Patient seen and examined at bedside.    Vital Signs  T(C): 37.3 (01-27-22 @ 15:26), Max: 37.3 (01-27-22 @ 04:20)  HR: 106 (01-27-22 @ 15:26) (89 - 106)  BP: 97/50 (01-27-22 @ 15:26) (93/57 - 107/52)  RR: 20 (01-27-22 @ 15:26) (20 - 20)  SpO2: 96% (01-27-22 @ 15:26) (95% - 99%)    Physical Exam:  Gen: AOx3, laying in bed comfortably in NAD      A/P: anxiety  Chart reviewed, hypothyroid 80mcg Synthroid, T4/TSH WNL  situational based anxiety  0.25 Xanax PRN ordered   RN to monitor, assess and escalate to PA PRN.  Will continue to monitor.

## 2022-01-27 NOTE — CONSULT NOTE ADULT - ASSESSMENT
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 Brown), hx of hyperthyroidism s/p FELDMAN now hypothyroid, HTN p/w hypotension at 80/40 with lack of appetite, nausea, dry heaves and constipation. In the ER, patient noted to have mildred Cr 2.37, bp responding to iv fluids. pt. is on metoprolol 100 mg in am and 50 mg in pm, also on losartan 100 mg daily, and amlodipine 5 mg daily. Consult for adrenal insufficiency.  outpatient chart reviewed extensively  patient follows with our clinic and started to see us in 2020 but was last seen 7/2021  patient initially had incidentally noted L adrenal adenoma on MRI L spine. patient was symptomatic with weight gain over 3 years without changes in diet or exercise. further workup reviewed 2 L adrenal adenomas (1.5cm and 3cm) and biochemical studies showed elevated midnight salivary cortisol level 0.34 (should be <0.09), 1mg DST never suppressed under 10 and random cortisol normal with suppressed ACTH (>5). Other labs normal    Hypotension in setting of recent L adrenalectomy for Cushing's syndrome- likely due to overmedication with degree of adrenal insufficiency in setting of MILDRED  -relative AI after surgery due to suppression of opposing adrenal gland  -likely will need steroids with slow taper until HPA axis and R adrenal gland recovers  -hold antihypertensive meds  -continue hydrocortisone 50mg Q8, will taper gradually over the next few days  -needs close endocrine follow up    Hypokalemia- possibly from decreased po intake. replete    Hypothyroidism- normal TFTs, continue LT4 88mcg  wi71F with Cushing's syndrome due to L adrenal adenoma s/p recent 1/3/22 L adrenalectomy (Dr. Brown), hx of hyperthyroidism s/p FELDMAN now hypothyroid, HTN p/w hypotension at 80/40 with lack of appetite, nausea, dry heaves and constipation. In the ER, patient noted to have mildred Cr 2.37, bp responding to iv fluids. pt. is on metoprolol 100 mg in am and 50 mg in pm, also on losartan 100 mg daily, and amlodipine 5 mg daily. Consult for adrenal insufficiency.  outpatient chart reviewed extensively  patient follows with our clinic and started to see us in 2020 but was last seen 7/2021  patient initially had incidentally noted L adrenal adenoma on MRI L spine. patient was symptomatic with weight gain over 3 years without changes in diet or exercise. further workup reviewed 2 L adrenal adenomas (1.5cm and 3cm) and biochemical studies showed elevated midnight salivary cortisol level 0.34 (should be <0.09), 1mg DST never suppressed under 10 and random cortisol normal with suppressed ACTH (>5). Other labs normal    Hypotension in setting of recent L adrenalectomy for Cushing's syndrome- likely due to overmedication with degree of adrenal insufficiency in setting of MILDRED  -relative AI after surgery due to suppression of opposing adrenal gland  -likely will need steroids with slow taper until HPA axis and R adrenal gland recovers  -hold antihypertensive meds  -BPs slowly improving, would continue hydrocortisone 50mg Q8  -needs close endocrine follow up    Hypokalemia- possibly from decreased po intake. replete    Hypothyroidism- normal TFTs, continue LT4 88mcg 71F with Cushing's syndrome due to L adrenal adenoma s/p recent 1/3/22 L adrenalectomy (Dr. Brown), hx of hyperthyroidism s/p FELDMAN now hypothyroid, HTN p/w hypotension at 80/40 with lack of appetite, nausea, dry heaves and constipation. In the ER, patient noted to have mildred Cr 2.37, bp responding to iv fluids. pt. is on metoprolol 100 mg in am and 50 mg in pm, also on losartan 100 mg daily, and amlodipine 5 mg daily. Consult for adrenal insufficiency.  outpatient chart reviewed extensively  patient follows with our clinic and started to see us in 2020 but was last seen 7/2021  patient initially had incidentally noted L adrenal adenoma on MRI L spine. patient was symptomatic with weight gain over 3 years without changes in diet or exercise. further workup reviewed 2 L adrenal adenomas (1.5cm and 3cm) and biochemical studies showed elevated midnight salivary cortisol level 0.34 (should be <0.09), 1mg DST never suppressed under 10 and random cortisol normal with suppressed ACTH (>5). Other labs normal    >2.5hrs spent reviewing outpatient and inpatient chart, discussing with outpatient endo/overnight hospitalist/patient    Hypotension in setting of recent L adrenalectomy for Cushing's syndrome- likely due to overmedication with degree of adrenal insufficiency in setting of MILDRED  -relative AI after surgery due to suppression of opposing adrenal gland  -likely will need steroids with slow taper until HPA axis and R adrenal gland recovers  -hold antihypertensive meds  -BPs slowly improving, would continue hydrocortisone 50mg Q8  -needs close endocrine follow up    Hypokalemia- possibly from decreased po intake. replete    Hypothyroidism- normal TFTs, continue LT4 88mcg

## 2022-01-27 NOTE — CHART NOTE - NSCHARTNOTEFT_GEN_A_CORE
Called by surgical team with information that pt had increased swelling LLE with pain and also found to be tachycardic.  Pt does c/o LLE pain and increased swelling.  No c/o palpitations or chest pain, no awareness of tachycardia Called by surgical team with information that pt had increased swelling LLE with pain and also found to be tachycardic.  Pt does c/o LLE pain and increased swelling.  No c/o palpitations or chest pain, no awareness of tachycardia.  review of cardiac monitor shows pt to not have tachycardia greater than 100bpm. Called by surgical team with information that pt had increased swelling LLE with pain and also found to be tachycardic.  Pt does c/o LLE pain and increased swelling.  No c/o palpitations or chest pain, no awareness of any tachycardia.  review of cardiac monitor shows pt to not have tachycardia greater than 100bpm. Per patient she had a negative cardiac workup by Dr. UZMA Tran in recent past  HPI:  pt. is a 70 y/o Female with a hx of L adrenal mass (initially noted to have Cushing syndrome, pre-op cortisol wnl),  s/p elective robotic L adrenalectomy on 1/3/22 with Dr. Brown that was well tolerated. Patient presents now to the ER  as her BP was low at home 80/40, felt lightheaded, no syncope, pt. also reports lack of appetite, po intake is not good as per pt as she does not feel like eating, no urine infection symptoms, + dry heaves, Denies fever, chills. no cough, no sob, no cp. pt. also c/o constipation, and mild pain and fulness in lower abd. area. since her procedure. Patient was seen in the [facility] surgery office last week and recommended to take daily Miralax which she has done every other day. Voiding adequately. pt. is seen by surgery team in the ER. PTt. noted to have mildred Cr 2.37, bp responding to iv fluids. pt. is on metoprolol 100 mg in am and 50 mg in pm, also on losartan 100 mg daily, and amlodipine 5 mg daily. As per pt. she was cleared by her cardiologist Dr. Tran for her recent surgery on 01/3/22.  (26 Jan 2022 18:21)    PAST MEDICAL & SURGICAL HISTORY:  Benign neoplasm of unspecified adrenal gland  Hypertension  Hyperlipidemia  Hypothyroidism  Depression  h/o Cushings syndrome  COVID-19 vaccine series completed  At risk for sleep apnea Bang score 3  H/O: hysterectomy  S/P cataract surgery  History of tonsillectomy  H/O total adrenalectomy, left    FAMILY HISTORY:  FH: heart disease (Mother, Grandparent)  FH: diabetes mellitus (Mother, Grandparent)    Social History:  stopped smoking 3 years ago, prior to that has smoked 1 1/2 ppd for 35 years, no etoh. (26 Jan 2022 18:21)    ROS: negative except for current admission complaint and LLE pain/swelling    Allergies  iodinated radiocontrast agents (Anaphylaxis; Angioedema)  sulfa drugs (Unknown)  Tequin (Unknown)  No known Intolerances    Current Medications:  acetaminophen     Tablet .. 650 milliGRAM(s) Oral every 6 hours PRN  atorvastatin 10 milliGRAM(s) Oral at bedtime  cefTRIAXone   IVPB      cefTRIAXone   IVPB 1000 milliGRAM(s) IV Intermittent every 24 hours  heparin   Injectable 5000 Unit(s) SubCutaneous every 8 hours  hydrocortisone sodium succinate Injectable 50 milliGRAM(s) IV Push every 8 hours  lactated ringers. 1000 milliLiter(s) IV Continuous <Continuous>  lactulose Syrup 20 Gram(s) Oral two times a day  levothyroxine 88 MICROGram(s) Oral daily  lidocaine   4% Patch 1 Patch Transdermal daily  midodrine. 5 milliGRAM(s) Oral three times a day  ondansetron Injectable 4 milliGRAM(s) IV Push every 6 hours PRN  oxyCODONE    IR 5 milliGRAM(s) Oral every 6 hours PRN  polyethylene glycol 3350 17 Gram(s) Oral daily  senna 2 Tablet(s) Oral at bedtime  sodium bicarbonate  Infusion 0.109 mEq/kG/Hr IV Continuous <Continuous>    On examination:   Vital Signs Last 24 Hrs  T(C): 37.3 (27 Jan 2022 19:24), Max: 37.3 (27 Jan 2022 04:20)  T(F): 99.2 (27 Jan 2022 19:24), Max: 99.2 (27 Jan 2022 19:24)  HR: 112 (27 Jan 2022 19:24) (89 - 112) pulse at 23:30-100bpm max  BP: 110/74 (27 Jan 2022 19:24) (93/57 - 110/74)  BP(mean): --  RR: 20 (27 Jan 2022 19:24) (20 - 20)  SpO2: 98% (27 Jan 2022 19:24) (95% - 98%)    Pt alert,  oriented x 4 in NAD  Lungs- clear bilaterally to ausc.  Heart- s1s2 heard, RRR no murmur, rate ~88bpm  Extremities- + swelling bilaterally lower extremities with healed surgical scars.  Left LE >Right LE with pain Left lower leg to touch;  No erythema note- pt refused examination of calf "don't touch me".  Pulses DP/AT bilaterally intact.  Capillary refill <3 seconds bilaterally.    Impression1- r/o DVT LLE                  2- MILDRED                  3- hypotension                   4- h/o left adrenalectomy 2/2 Cushing's syndrome/adrenal mass                  4- hypothyroidism  Plan: 1- review of cardiac monitor strips/events- none found, NSR          2- duplex LLE ordered          3- d/w Patient and RN plan of care all questions answered

## 2022-01-27 NOTE — CONSULT NOTE ADULT - SUBJECTIVE AND OBJECTIVE BOX
Patient is a 71y old  Female who presents with a chief complaint of MILDRED/ Hypotension, constipation. 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 Brown), hx of hyperthyroidism s/p FELDMAN now hypothyroid, HTN p/w hypotension at 80/40 with lack of appetite, nausea, dry heaves and constipation. In the ER, patient noted to have mildred Cr 2.37, bp responding to iv fluids. pt. is on metoprolol 100 mg in am and 50 mg in pm, also on losartan 100 mg daily, and amlodipine 5 mg daily. Consult for adrenal insufficiency.    Above Reviewed,     D/W HM,     patient reports feeling better and without nausea for the last few hours  still not much of an appetite  BPs improving but still low,    PAST MEDICAL & SURGICAL HISTORY:  Benign neoplasm of unspecified adrenal gland    Hypertension    Hyperlipidemia    Hypothyroidism    Depression    COVID-19 vaccine series completed    At risk for sleep apnea  Bang score 3    H/O: hysterectomy    S/P cataract surgery    History of tonsillectomy    H/O total adrenalectomy  left        Social History:  stopped smoking 3 years ago, prior to that has smoked 1 1/2 ppd for 35 years, no etoh. (26 Jan 2022 18:21)      FAMILY HISTORY:  FH: heart disease (Mother, Grandparent)    FH: diabetes mellitus (Mother, Grandparent)          Allergies    iodinated radiocontrast agents (Anaphylaxis; Angioedema)  sulfa drugs (Unknown)  Tequin (Unknown)    Intolerances        REVIEW OF SYSTEMS:    CONSTITUTIONAL: No fever, weight loss, or fatigue  EYES: No eye pain, visual disturbances, or discharge  ENMT:  No difficulty hearing, tinnitus, vertigo; No sinus or throat pain  NECK: No pain or stiffness  RESPIRATORY: No cough, wheezing, chills or hemoptysis; No shortness of breath  CARDIOVASCULAR: No chest pain, palpitations, dizziness, or leg swelling  GASTROINTESTINAL: No abdominal or epigastric pain. No nausea, vomiting, or hematemesis; No diarrhea or constipation. No melena or hematochezia.  NEUROLOGICAL: No headaches, memory loss, loss of strength, numbness, or tremors  SKIN: No itching, burning, rashes, or lesions   MUSCULOSKELETAL: No joint pain or swelling; No muscle, back, or extremity pain  PSYCHIATRIC: No depression, anxiety, mood swings, or difficulty sleeping        MEDICATIONS  (STANDING):  atorvastatin 10 milliGRAM(s) Oral at bedtime  cefTRIAXone   IVPB      cefTRIAXone   IVPB 1000 milliGRAM(s) IV Intermittent every 24 hours  heparin   Injectable 5000 Unit(s) SubCutaneous every 8 hours  hydrocortisone sodium succinate Injectable 50 milliGRAM(s) IV Push every 8 hours  lactated ringers. 1000 milliLiter(s) (110 mL/Hr) IV Continuous <Continuous>  lactulose Syrup 20 Gram(s) Oral two times a day  levothyroxine 88 MICROGram(s) Oral daily  polyethylene glycol 3350 17 Gram(s) Oral daily  senna 2 Tablet(s) Oral at bedtime    MEDICATIONS  (PRN):  acetaminophen     Tablet .. 650 milliGRAM(s) Oral every 6 hours PRN Temp greater or equal to 38C (100.4F), Mild Pain (1 - 3), Moderate Pain (4 - 6)  ondansetron Injectable 4 milliGRAM(s) IV Push every 6 hours PRN Nausea and/or Vomiting      Vital Signs Last 24 Hrs  T(C): 37.3 (27 Jan 2022 04:20), Max: 37.3 (27 Jan 2022 04:20)  T(F): 99.1 (27 Jan 2022 04:20), Max: 99.1 (27 Jan 2022 04:20)  HR: 95 (27 Jan 2022 04:20) (91 - 103)  BP: 96/61 (27 Jan 2022 04:20) (87/57 - 118/61)  BP(mean): --  RR: 20 (27 Jan 2022 04:20) (18 - 22)  SpO2: 95% (27 Jan 2022 04:20) (95% - 99%)  Height (cm): 160 (01-26-22 @ 13:30)  Weight (kg): 86.2 (01-26-22 @ 13:30)  BMI (kg/m2): 33.7 (01-26-22 @ 13:30)  BSA (m2): 1.89 (01-26-22 @ 13:30)    Physical Exam:    Constitutional: NAD, central obesity  HEENT: EOMI, no exophalmos  Neck: trachea midline, no thyroid enlargement  Respiratory: CTAB, normal respirations  Cardiovascular: S1 and S2, RRR  Gastrointestinal: BS+, soft, ntnd  Extremities: No peripheral edema  Neurological: AOx3, no focal deficits  Psychiatric: Normal mood and normal affect  Skin: no rashes, no acanthosis    LABS  01-27    139  |  105  |  14.6  ----------------------------<  128<H>  3.4<L>   |  17.0<L>  |  2.25<H>    Ca    8.1<L>      27 Jan 2022 03:02    TPro  5.5<L>  /  Alb  3.0<L>  /  TBili  0.4  /  DBili  x   /  AST  31  /  ALT  27  /  AlkPhos  103  01-27                          13.0   9.09  )-----------( 274      ( 27 Jan 2022 03:02 )             40.8       A1C with Estimated Average Glucose Result: 5.3 % (12-30-21 @ 17:20)    Alanine Aminotransferase (ALT/SGPT): 27 U/L (01-27-22 @ 03:02)  Albumin, Serum: 3.0 g/dL (01-27-22 @ 03:02)  Aspartate Aminotransferase (AST/SGOT): 31 U/L (01-27-22 @ 03:02)  Alkaline Phosphatase, Serum: 103 U/L (01-27-22 @ 03:02)  Albumin, Serum: 3.2 g/dL (01-26-22 @ 14:11)  Aspartate Aminotransferase (AST/SGOT): 43 U/L (01-26-22 @ 14:11)  Alkaline Phosphatase, Serum: 117 U/L (01-26-22 @ 14:11)  Alanine Aminotransferase (ALT/SGPT): 33 U/L (01-26-22 @ 14:11)    Triiodothyronine, Total (T3 Total): 125 ng/dL (01-27-22 @ 03:02)  T4, Serum: 7.0 ug/dL (01-27-22 @ 03:02)  Thyroid Stimulating Hormone, Serum: 1.81 uIU/mL (01-27-22 @ 03:02)

## 2022-01-27 NOTE — PROGRESS NOTE ADULT - ASSESSMENT
71 year old women, s/p left adrenalectomy, presented with constipation and MILDRED. Patient been hypotensive had a lactate of 2.2, trended down after IVF were given  Admitted to the medical service, endocrinology team on board, plan to send Cortisol and ACTH levels, she was started on steroids.  US with no signs of significant findings.         PLAN:    - Management per primary team  - Recommend treatment of constipation with fleet enemas and Golytely   - No acute surgical intervention at this time  - Surgery will continue to follow

## 2022-01-27 NOTE — CONSULT NOTE ADULT - SUBJECTIVE AND OBJECTIVE BOX
Patient is a 71y old  Female who presents with a chief complaint of MILDRED/ Hypotension, constipation. 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 Brown), hx of hyperthyroidism s/p FELDMAN now hypothyroid, HTN p/w hypotension at 80/40 with lack of appetite, nausea, dry heaves and constipation. In the ER, patient noted to have mildred Cr 2.37, bp responding to iv fluids. pt. is on metoprolol 100 mg in am and 50 mg in pm, also on losartan 100 mg daily, and amlodipine 5 mg daily. Consult for adrenal insufficiency.    spoke with overnight hospitalist- to obtain acth and cortisol prior to hydrocortisone and start hydrocortisone 100x1 followed by 50Q8    outpatient chart reviewed extensively  patient follows with our clinic and started to see us in 2020 but was last seen 7/2021  patient initially had incidentally noted L adrenal adenoma on MRI L spine. patient was symptomatic with weight gain over 3 years without changes in diet or exercise. further workup reviewed 2 L adrenal adenomas (1.5cm and 3cm) and biochemical studies showed elevated midnight salivary cortisol level 0.34 (should be <0.09), 1mg DST never suppressed under 10 and random cortisol normal with suppressed ACTH (>5). patient was subsequently recommended surgery.    patient was supposed to follow up with us post op but have not (no scheduled appointments with us)      PAST MEDICAL & SURGICAL HISTORY:  Benign neoplasm of unspecified adrenal gland    Hypertension    Hyperlipidemia    Hypothyroidism    Depression    COVID-19 vaccine series completed    At risk for sleep apnea  Bang score 3    H/O: hysterectomy    S/P cataract surgery    History of tonsillectomy    H/O total adrenalectomy  left        Social History:  stopped smoking 3 years ago, prior to that has smoked 1 1/2 ppd for 35 years, no etoh. (26 Jan 2022 18:21)      FAMILY HISTORY:  FH: heart disease (Mother, Grandparent)    FH: diabetes mellitus (Mother, Grandparent)          Allergies    iodinated radiocontrast agents (Anaphylaxis; Angioedema)  sulfa drugs (Unknown)  Tequin (Unknown)    Intolerances        REVIEW OF SYSTEMS:    CONSTITUTIONAL: No fever, weight loss, or fatigue  EYES: No eye pain, visual disturbances, or discharge  ENMT:  No difficulty hearing, tinnitus, vertigo; No sinus or throat pain  NECK: No pain or stiffness  RESPIRATORY: No cough, wheezing, chills or hemoptysis; No shortness of breath  CARDIOVASCULAR: No chest pain, palpitations, dizziness, or leg swelling  GASTROINTESTINAL: No abdominal or epigastric pain. No nausea, vomiting, or hematemesis; No diarrhea or constipation. No melena or hematochezia.  NEUROLOGICAL: No headaches, memory loss, loss of strength, numbness, or tremors  SKIN: No itching, burning, rashes, or lesions   MUSCULOSKELETAL: No joint pain or swelling; No muscle, back, or extremity pain  PSYCHIATRIC: No depression, anxiety, mood swings, or difficulty sleeping        MEDICATIONS  (STANDING):  atorvastatin 10 milliGRAM(s) Oral at bedtime  cefTRIAXone   IVPB      cefTRIAXone   IVPB 1000 milliGRAM(s) IV Intermittent every 24 hours  heparin   Injectable 5000 Unit(s) SubCutaneous every 8 hours  hydrocortisone sodium succinate Injectable 50 milliGRAM(s) IV Push every 8 hours  lactated ringers. 1000 milliLiter(s) (110 mL/Hr) IV Continuous <Continuous>  lactulose Syrup 20 Gram(s) Oral two times a day  levothyroxine 88 MICROGram(s) Oral daily  polyethylene glycol 3350 17 Gram(s) Oral daily  senna 2 Tablet(s) Oral at bedtime    MEDICATIONS  (PRN):  acetaminophen     Tablet .. 650 milliGRAM(s) Oral every 6 hours PRN Temp greater or equal to 38C (100.4F), Mild Pain (1 - 3), Moderate Pain (4 - 6)  ondansetron Injectable 4 milliGRAM(s) IV Push every 6 hours PRN Nausea and/or Vomiting      Vital Signs Last 24 Hrs  T(C): 37.3 (27 Jan 2022 04:20), Max: 37.3 (27 Jan 2022 04:20)  T(F): 99.1 (27 Jan 2022 04:20), Max: 99.1 (27 Jan 2022 04:20)  HR: 95 (27 Jan 2022 04:20) (91 - 103)  BP: 96/61 (27 Jan 2022 04:20) (87/57 - 118/61)  BP(mean): --  RR: 20 (27 Jan 2022 04:20) (18 - 22)  SpO2: 95% (27 Jan 2022 04:20) (95% - 99%)  Height (cm): 160 (01-26-22 @ 13:30)  Weight (kg): 86.2 (01-26-22 @ 13:30)  BMI (kg/m2): 33.7 (01-26-22 @ 13:30)  BSA (m2): 1.89 (01-26-22 @ 13:30)    Physical Exam:    Constitutional: NAD, central obesity  HEENT: EOMI, no exophalmos  Neck: trachea midline, no thyroid enlargement  Respiratory: CTAB, normal respirations  Cardiovascular: S1 and S2, RRR  Gastrointestinal: BS+, soft, ntnd  Extremities: No peripheral edema  Neurological: AOx3, no focal deficits  Psychiatric: Normal mood and normal affect  Skin: no rashes, no acanthosis    LABS  01-27    139  |  105  |  14.6  ----------------------------<  128<H>  3.4<L>   |  17.0<L>  |  2.25<H>    Ca    8.1<L>      27 Jan 2022 03:02    TPro  5.5<L>  /  Alb  3.0<L>  /  TBili  0.4  /  DBili  x   /  AST  31  /  ALT  27  /  AlkPhos  103  01-27                          13.0   9.09  )-----------( 274      ( 27 Jan 2022 03:02 )             40.8       A1C with Estimated Average Glucose Result: 5.3 % (12-30-21 @ 17:20)          Alanine Aminotransferase (ALT/SGPT): 27 U/L (01-27-22 @ 03:02)  Albumin, Serum: 3.0 g/dL (01-27-22 @ 03:02)  Aspartate Aminotransferase (AST/SGOT): 31 U/L (01-27-22 @ 03:02)  Alkaline Phosphatase, Serum: 103 U/L (01-27-22 @ 03:02)  Albumin, Serum: 3.2 g/dL (01-26-22 @ 14:11)  Aspartate Aminotransferase (AST/SGOT): 43 U/L (01-26-22 @ 14:11)  Alkaline Phosphatase, Serum: 117 U/L (01-26-22 @ 14:11)  Alanine Aminotransferase (ALT/SGPT): 33 U/L (01-26-22 @ 14:11)    Triiodothyronine, Total (T3 Total): 125 ng/dL (01-27-22 @ 03:02)  T4, Serum: 7.0 ug/dL (01-27-22 @ 03:02)  Thyroid Stimulating Hormone, Serum: 1.81 uIU/mL (01-27-22 @ 03:02)    CAPILLARY BLOOD GLUCOSE         Patient is a 71y old  Female who presents with a chief complaint of MILDRED/ Hypotension, constipation. 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 Brown), hx of hyperthyroidism s/p FELDMAN now hypothyroid, HTN p/w hypotension at 80/40 with lack of appetite, nausea, dry heaves and constipation. In the ER, patient noted to have mildred Cr 2.37, bp responding to iv fluids. pt. is on metoprolol 100 mg in am and 50 mg in pm, also on losartan 100 mg daily, and amlodipine 5 mg daily. Consult for adrenal insufficiency.    spoke with overnight hospitalist- to obtain acth and cortisol prior to hydrocortisone and start hydrocortisone 100x1 followed by 50Q8    outpatient chart reviewed extensively  patient follows with our clinic and started to see us in 2020 but was last seen 7/2021  patient initially had incidentally noted L adrenal adenoma on MRI L spine. patient was symptomatic with weight gain over 3 years without changes in diet or exercise. further workup reviewed 2 L adrenal adenomas (1.5cm and 3cm) and biochemical studies showed elevated midnight salivary cortisol level 0.34 (should be <0.09), 1mg DST never suppressed under 10 and random cortisol normal with suppressed ACTH (>5). patient was subsequently recommended surgery.    patient was supposed to follow up with us post op but have not (no scheduled appointments with us)    patient reports feeling better and without nausea for the last few hours  still not much of an appetite  BPs improving but still lows      PAST MEDICAL & SURGICAL HISTORY:  Benign neoplasm of unspecified adrenal gland    Hypertension    Hyperlipidemia    Hypothyroidism    Depression    COVID-19 vaccine series completed    At risk for sleep apnea  Bang score 3    H/O: hysterectomy    S/P cataract surgery    History of tonsillectomy    H/O total adrenalectomy  left        Social History:  stopped smoking 3 years ago, prior to that has smoked 1 1/2 ppd for 35 years, no etoh. (26 Jan 2022 18:21)      FAMILY HISTORY:  FH: heart disease (Mother, Grandparent)    FH: diabetes mellitus (Mother, Grandparent)          Allergies    iodinated radiocontrast agents (Anaphylaxis; Angioedema)  sulfa drugs (Unknown)  Tequin (Unknown)    Intolerances        REVIEW OF SYSTEMS:    CONSTITUTIONAL: No fever, weight loss, or fatigue  EYES: No eye pain, visual disturbances, or discharge  ENMT:  No difficulty hearing, tinnitus, vertigo; No sinus or throat pain  NECK: No pain or stiffness  RESPIRATORY: No cough, wheezing, chills or hemoptysis; No shortness of breath  CARDIOVASCULAR: No chest pain, palpitations, dizziness, or leg swelling  GASTROINTESTINAL: No abdominal or epigastric pain. No nausea, vomiting, or hematemesis; No diarrhea or constipation. No melena or hematochezia.  NEUROLOGICAL: No headaches, memory loss, loss of strength, numbness, or tremors  SKIN: No itching, burning, rashes, or lesions   MUSCULOSKELETAL: No joint pain or swelling; No muscle, back, or extremity pain  PSYCHIATRIC: No depression, anxiety, mood swings, or difficulty sleeping        MEDICATIONS  (STANDING):  atorvastatin 10 milliGRAM(s) Oral at bedtime  cefTRIAXone   IVPB      cefTRIAXone   IVPB 1000 milliGRAM(s) IV Intermittent every 24 hours  heparin   Injectable 5000 Unit(s) SubCutaneous every 8 hours  hydrocortisone sodium succinate Injectable 50 milliGRAM(s) IV Push every 8 hours  lactated ringers. 1000 milliLiter(s) (110 mL/Hr) IV Continuous <Continuous>  lactulose Syrup 20 Gram(s) Oral two times a day  levothyroxine 88 MICROGram(s) Oral daily  polyethylene glycol 3350 17 Gram(s) Oral daily  senna 2 Tablet(s) Oral at bedtime    MEDICATIONS  (PRN):  acetaminophen     Tablet .. 650 milliGRAM(s) Oral every 6 hours PRN Temp greater or equal to 38C (100.4F), Mild Pain (1 - 3), Moderate Pain (4 - 6)  ondansetron Injectable 4 milliGRAM(s) IV Push every 6 hours PRN Nausea and/or Vomiting      Vital Signs Last 24 Hrs  T(C): 37.3 (27 Jan 2022 04:20), Max: 37.3 (27 Jan 2022 04:20)  T(F): 99.1 (27 Jan 2022 04:20), Max: 99.1 (27 Jan 2022 04:20)  HR: 95 (27 Jan 2022 04:20) (91 - 103)  BP: 96/61 (27 Jan 2022 04:20) (87/57 - 118/61)  BP(mean): --  RR: 20 (27 Jan 2022 04:20) (18 - 22)  SpO2: 95% (27 Jan 2022 04:20) (95% - 99%)  Height (cm): 160 (01-26-22 @ 13:30)  Weight (kg): 86.2 (01-26-22 @ 13:30)  BMI (kg/m2): 33.7 (01-26-22 @ 13:30)  BSA (m2): 1.89 (01-26-22 @ 13:30)    Physical Exam:    Constitutional: NAD, central obesity  HEENT: EOMI, no exophalmos  Neck: trachea midline, no thyroid enlargement  Respiratory: CTAB, normal respirations  Cardiovascular: S1 and S2, RRR  Gastrointestinal: BS+, soft, ntnd  Extremities: No peripheral edema  Neurological: AOx3, no focal deficits  Psychiatric: Normal mood and normal affect  Skin: no rashes, no acanthosis    LABS  01-27    139  |  105  |  14.6  ----------------------------<  128<H>  3.4<L>   |  17.0<L>  |  2.25<H>    Ca    8.1<L>      27 Jan 2022 03:02    TPro  5.5<L>  /  Alb  3.0<L>  /  TBili  0.4  /  DBili  x   /  AST  31  /  ALT  27  /  AlkPhos  103  01-27                          13.0   9.09  )-----------( 274      ( 27 Jan 2022 03:02 )             40.8       A1C with Estimated Average Glucose Result: 5.3 % (12-30-21 @ 17:20)          Alanine Aminotransferase (ALT/SGPT): 27 U/L (01-27-22 @ 03:02)  Albumin, Serum: 3.0 g/dL (01-27-22 @ 03:02)  Aspartate Aminotransferase (AST/SGOT): 31 U/L (01-27-22 @ 03:02)  Alkaline Phosphatase, Serum: 103 U/L (01-27-22 @ 03:02)  Albumin, Serum: 3.2 g/dL (01-26-22 @ 14:11)  Aspartate Aminotransferase (AST/SGOT): 43 U/L (01-26-22 @ 14:11)  Alkaline Phosphatase, Serum: 117 U/L (01-26-22 @ 14:11)  Alanine Aminotransferase (ALT/SGPT): 33 U/L (01-26-22 @ 14:11)    Triiodothyronine, Total (T3 Total): 125 ng/dL (01-27-22 @ 03:02)  T4, Serum: 7.0 ug/dL (01-27-22 @ 03:02)  Thyroid Stimulating Hormone, Serum: 1.81 uIU/mL (01-27-22 @ 03:02)    CAPILLARY BLOOD GLUCOSE

## 2022-01-27 NOTE — CONSULT NOTE ADULT - ASSESSMENT
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 Brown), hx of hyperthyroidism s/p FELDMAN now hypothyroid, HTN p/w hypotension at 80/40 with lack of appetite, nausea, dry heaves and constipation.     In the ER, patient noted to have mildred Cr 2.37, bp responding to iv fluids. pt. is on metoprolol 100 mg in am and 50 mg in pm, also on losartan 100 mg daily, and amlodipine 5 mg daily.     Above Reviewed,     ? ATN - Pre Renal .     IVF,     Hypotension in setting of recent L adrenalectomy for Cushing's syndrome- likely due to overmedication with degree of adrenal insufficiency in setting of MILDRED    -Hold antihypertensive meds, Incl. ARB , IV  Steroids ,     Hypokalemia- Nutritional,  Replete,

## 2022-01-28 LAB
ALBUMIN SERPL ELPH-MCNC: 3.4 G/DL — SIGNIFICANT CHANGE UP (ref 3.3–5.2)
ALP SERPL-CCNC: 113 U/L — SIGNIFICANT CHANGE UP (ref 40–120)
ALT FLD-CCNC: 23 U/L — SIGNIFICANT CHANGE UP
ANION GAP SERPL CALC-SCNC: 17 MMOL/L — SIGNIFICANT CHANGE UP (ref 5–17)
AST SERPL-CCNC: 20 U/L — SIGNIFICANT CHANGE UP
BASOPHILS # BLD AUTO: 0.01 K/UL — SIGNIFICANT CHANGE UP (ref 0–0.2)
BASOPHILS NFR BLD AUTO: 0.1 % — SIGNIFICANT CHANGE UP (ref 0–2)
BILIRUB SERPL-MCNC: 0.3 MG/DL — LOW (ref 0.4–2)
BUN SERPL-MCNC: 17.8 MG/DL — SIGNIFICANT CHANGE UP (ref 8–20)
CALCIUM SERPL-MCNC: 8.6 MG/DL — SIGNIFICANT CHANGE UP (ref 8.6–10.2)
CHLORIDE SERPL-SCNC: 102 MMOL/L — SIGNIFICANT CHANGE UP (ref 98–107)
CO2 SERPL-SCNC: 18 MMOL/L — LOW (ref 22–29)
CREAT SERPL-MCNC: 1.95 MG/DL — HIGH (ref 0.5–1.3)
EOSINOPHIL # BLD AUTO: 0 K/UL — SIGNIFICANT CHANGE UP (ref 0–0.5)
EOSINOPHIL NFR BLD AUTO: 0 % — SIGNIFICANT CHANGE UP (ref 0–6)
GLUCOSE SERPL-MCNC: 146 MG/DL — HIGH (ref 70–99)
HCT VFR BLD CALC: 39.3 % — SIGNIFICANT CHANGE UP (ref 34.5–45)
HGB BLD-MCNC: 12.8 G/DL — SIGNIFICANT CHANGE UP (ref 11.5–15.5)
IMM GRANULOCYTES NFR BLD AUTO: 0.5 % — SIGNIFICANT CHANGE UP (ref 0–1.5)
LYMPHOCYTES # BLD AUTO: 0.75 K/UL — LOW (ref 1–3.3)
LYMPHOCYTES # BLD AUTO: 8.7 % — LOW (ref 13–44)
MAGNESIUM SERPL-MCNC: 2.7 MG/DL — HIGH (ref 1.6–2.6)
MAGNESIUM UR-MCNC: 2.6 MG/DL — SIGNIFICANT CHANGE UP
MCHC RBC-ENTMCNC: 28.9 PG — SIGNIFICANT CHANGE UP (ref 27–34)
MCHC RBC-ENTMCNC: 32.6 GM/DL — SIGNIFICANT CHANGE UP (ref 32–36)
MCV RBC AUTO: 88.7 FL — SIGNIFICANT CHANGE UP (ref 80–100)
MONOCYTES # BLD AUTO: 0.52 K/UL — SIGNIFICANT CHANGE UP (ref 0–0.9)
MONOCYTES NFR BLD AUTO: 6 % — SIGNIFICANT CHANGE UP (ref 2–14)
NEUTROPHILS # BLD AUTO: 7.28 K/UL — SIGNIFICANT CHANGE UP (ref 1.8–7.4)
NEUTROPHILS NFR BLD AUTO: 84.7 % — HIGH (ref 43–77)
PLATELET # BLD AUTO: 276 K/UL — SIGNIFICANT CHANGE UP (ref 150–400)
POTASSIUM SERPL-MCNC: 3.5 MMOL/L — SIGNIFICANT CHANGE UP (ref 3.5–5.3)
POTASSIUM SERPL-SCNC: 3.5 MMOL/L — SIGNIFICANT CHANGE UP (ref 3.5–5.3)
PROT SERPL-MCNC: 6.3 G/DL — LOW (ref 6.6–8.7)
RBC # BLD: 4.43 M/UL — SIGNIFICANT CHANGE UP (ref 3.8–5.2)
RBC # FLD: 15.9 % — HIGH (ref 10.3–14.5)
SODIUM SERPL-SCNC: 137 MMOL/L — SIGNIFICANT CHANGE UP (ref 135–145)
WBC # BLD: 8.6 K/UL — SIGNIFICANT CHANGE UP (ref 3.8–10.5)
WBC # FLD AUTO: 8.6 K/UL — SIGNIFICANT CHANGE UP (ref 3.8–10.5)

## 2022-01-28 PROCEDURE — 99233 SBSQ HOSP IP/OBS HIGH 50: CPT

## 2022-01-28 PROCEDURE — 76770 US EXAM ABDO BACK WALL COMP: CPT | Mod: 26

## 2022-01-28 PROCEDURE — 93926 LOWER EXTREMITY STUDY: CPT | Mod: 26,LT

## 2022-01-28 PROCEDURE — 99232 SBSQ HOSP IP/OBS MODERATE 35: CPT

## 2022-01-28 RX ADMIN — SENNA PLUS 2 TABLET(S): 8.6 TABLET ORAL at 21:42

## 2022-01-28 RX ADMIN — LACTULOSE 20 GRAM(S): 10 SOLUTION ORAL at 05:36

## 2022-01-28 RX ADMIN — Medication 50 MILLIGRAM(S): at 21:42

## 2022-01-28 RX ADMIN — POLYETHYLENE GLYCOL 3350 17 GRAM(S): 17 POWDER, FOR SOLUTION ORAL at 11:36

## 2022-01-28 RX ADMIN — MIDODRINE HYDROCHLORIDE 5 MILLIGRAM(S): 2.5 TABLET ORAL at 05:37

## 2022-01-28 RX ADMIN — Medication 50 MILLIGRAM(S): at 13:08

## 2022-01-28 RX ADMIN — LIDOCAINE 1 PATCH: 4 CREAM TOPICAL at 11:36

## 2022-01-28 RX ADMIN — HEPARIN SODIUM 5000 UNIT(S): 5000 INJECTION INTRAVENOUS; SUBCUTANEOUS at 13:09

## 2022-01-28 RX ADMIN — ATORVASTATIN CALCIUM 10 MILLIGRAM(S): 80 TABLET, FILM COATED ORAL at 21:42

## 2022-01-28 RX ADMIN — Medication 650 MILLIGRAM(S): at 06:02

## 2022-01-28 RX ADMIN — Medication 50 MILLIGRAM(S): at 05:34

## 2022-01-28 RX ADMIN — LIDOCAINE 1 PATCH: 4 CREAM TOPICAL at 23:58

## 2022-01-28 RX ADMIN — HEPARIN SODIUM 5000 UNIT(S): 5000 INJECTION INTRAVENOUS; SUBCUTANEOUS at 05:35

## 2022-01-28 RX ADMIN — Medication 650 MILLIGRAM(S): at 05:38

## 2022-01-28 RX ADMIN — LACTULOSE 20 GRAM(S): 10 SOLUTION ORAL at 17:52

## 2022-01-28 RX ADMIN — LIDOCAINE 1 PATCH: 4 CREAM TOPICAL at 18:09

## 2022-01-28 RX ADMIN — Medication 125 MEQ/KG/HR: at 05:33

## 2022-01-28 RX ADMIN — CEFTRIAXONE 100 MILLIGRAM(S): 500 INJECTION, POWDER, FOR SOLUTION INTRAMUSCULAR; INTRAVENOUS at 21:40

## 2022-01-28 RX ADMIN — LIDOCAINE 1 PATCH: 4 CREAM TOPICAL at 06:01

## 2022-01-28 RX ADMIN — HEPARIN SODIUM 5000 UNIT(S): 5000 INJECTION INTRAVENOUS; SUBCUTANEOUS at 21:41

## 2022-01-28 RX ADMIN — Medication 88 MICROGRAM(S): at 05:36

## 2022-01-28 NOTE — PROGRESS NOTE ADULT - ASSESSMENT
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 Brown), hx of hyperthyroidism s/p FELDMAN now hypothyroid, HTN p/w hypotension at 80/40 with lack of appetite, nausea, dry heaves and constipation.     Pre Renal .     Hypotension in setting of recent L adrenalectomy for Cushing's syndrome- likely due to overmedication with degree of adrenal insufficiency in setting of MILDRED    -Hold antihypertensive meds, Incl. ARB , IV  Steroids ,     Hypokalemia- Nutritional,  Replete,     SCr improving; Sadia < 30 ; prerenal; improved with IVF;    Signing off    Please recall if needed

## 2022-01-28 NOTE — PROGRESS NOTE ADULT - ASSESSMENT
pt. is a 70 y/o Female with a hx of L adrenal mass (initially noted to have Cushing syndrome, pre-op cortisol wnl),  s/p elective robotic L adrenalectomy on 1/3/22 with Dr. Brown that was well tolerated. Patient presents now to the ER  as her BP was low at home 80/40, felt lightheaded, no syncope, pt. also reports lack of appetite, po intake is not good as per pt as she does not feel like eating, no urine infection symptoms + dry heaves, Denies fever, chills. no cough, no sob, no cp. pt. also c/o constipation, and mild pain and fulness in lower abd. area. since her procedure. Patient was seen in the  [facility] surgery office last week and recommended to take daily Miralax which she has done every other day. Voiding adequately. pt. is seen by surgery team in the ER. PTt. noted to have mildred Cr 2.37, bp responding to iv fluids. pt. is on metoprolol 100 mg in am and 50 mg in pm, also on losartan 100 mg daily, and amlodipine 5 mg daily. As per pt. she was cleared by her cardiologist Dr. Tran for her recent surgery on 01/3/22.     #Hypotension- possible multifactorial, medication related/dehydration/ adrenalectomy ? infection ? no elevated wbc, rectal temp 100.1 on admission   - will start midodrine   - endocrine following - c.w iv stress steroids     # MILDRED, renal sono no acute findings, will hold losartan and avoid any other nephrotoxic meds.   - nephro following   improved    # Constipation, unspecified type, surgery team following,   resolved    #S/P left adrenalectomy , endocrinology follow up, surgery on board. hydrocortisone.    # hypothyroidism unspecified type, continue synthroid.     #hyperlipidemia unspecified type. continue statin.     #back pain - lidocaine patch     #pain - oxycodone prn     pt consult  - may need angeline

## 2022-01-28 NOTE — PROGRESS NOTE ADULT - ASSESSMENT
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 Brown), hx of hyperthyroidism s/p FELDMAN now hypothyroid, HTN p/w hypotension at 80/40 with lack of appetite, nausea, dry heaves and constipation. In the ER, patient noted to have mildred Cr 2.37, bp responding to iv fluids. pt. is on metoprolol 100 mg in am and 50 mg in pm, also on losartan 100 mg daily, and amlodipine 5 mg daily. Consult for adrenal insufficiency.  outpatient chart reviewed extensively  patient follows with our clinic and started to see us in 2020 but was last seen 7/2021  patient initially had incidentally noted L adrenal adenoma on MRI L spine. patient was symptomatic with weight gain over 3 years without changes in diet or exercise. further workup reviewed 2 L adrenal adenomas (1.5cm and 3cm) and biochemical studies showed elevated midnight salivary cortisol level 0.34 (should be <0.09), 1mg DST never suppressed under 10 and random cortisol normal with suppressed ACTH (>5). Other labs normal    1. Hypotension in setting of recent L adrenalectomy for Cushing's syndrome- likely due to overmedication with degree of adrenal insufficiency in setting of MILDRED  - Relative AI after surgery due to suppression of opposing adrenal gland  - Likely will need steroids with slow taper until HPA axis and R adrenal gland recovers  - Would continue hydrocortisone 50mg Q8  - Needs close endocrine follow up  - BP improving    2. Hypothyroidism  - Normal TFTs  - Continue LT4 88mcg     71F with Cushing's syndrome due to L adrenal adenoma s/p recent 1/3/22 L adrenalectomy (Dr. Brown), hx of hyperthyroidism s/p FELDMAN now hypothyroid, HTN p/w hypotension at 80/40 with lack of appetite, nausea, dry heaves and constipation. In the ER, patient noted to have mildred Cr 2.37, bp responding to iv fluids. pt. is on metoprolol 100 mg in am and 50 mg in pm, also on losartan 100 mg daily, and amlodipine 5 mg daily. Consult for adrenal insufficiency.  outpatient chart reviewed extensively  patient follows with our clinic and started to see us in 2020 but was last seen 7/2021  patient initially had incidentally noted L adrenal adenoma on MRI L spine. patient was symptomatic with weight gain over 3 years without changes in diet or exercise. further workup reviewed 2 L adrenal adenomas (1.5cm and 3cm) and biochemical studies showed elevated midnight salivary cortisol level 0.34 (should be <0.09), 1mg DST never suppressed under 10 and random cortisol normal with suppressed ACTH (>5). Other labs normal    1. Hypotension in setting of recent L adrenalectomy for Cushing's syndrome- likely due to overmedication with degree of adrenal insufficiency in setting of MILDRED  - Relative AI after surgery due to suppression of opposing adrenal gland  - Likely will need steroids with slow taper until HPA axis and R adrenal gland recovers  - BP improving  - Decrease hydrocortisone to 50mg q12    Follow up Endocrine apps:  March 2nd, 9:00 with Judith at [facility] in [facility]  May 10th, 9:40 with Dr. Patricia at [facility] in [facility]    2. Hypothyroidism  - Normal TFTs  - Continue LT4 88mcg

## 2022-01-28 NOTE — PROGRESS NOTE ADULT - ATTENDING COMMENTS
patient seen and examined. plan discussed with NP and changes incorporated in the note.    clinically improving  bp normalizing, will taper hydrocort  will need outpatient follow up for very very slow taper off  acth detectable  serum free cortisol was sent but pending  agree with above patient seen and examined. plan discussed with NP and changes incorporated in the note.    clinically improving  bp normalizing, will taper hydrocort  will need outpatient follow up for very very slow taper off  acth detectable at 27  serum free cortisol was sent but pending  agree with above

## 2022-01-28 NOTE — PROGRESS NOTE ADULT - ASSESSMENT
A:   71 year old woman, s/p left adrenalectomy, presented with constipation and MILDRED. Patient been hypotensive had a lactate of 2.2, trended down after IVF were given  Admitted to the medical service, endocrinology team on board, plan to send Cortisol and ACTH levels, she was started on steroids for suspected hypercortisolism.      PLAN:    - Management per primary team  - Recommend treatment of costipation with fleet enemas and Golytely   - Agree with endocrine recs, f/u AM cortisol   - Monitor HR        Recommend EKG , duplex lower ext to r/o DVT, discussed with overnight medicine PA   - Advance diet as tolerated with return of bowel function   - No acute surgical intervention at this time  - Surgery will continue to follo

## 2022-01-29 LAB
ALBUMIN SERPL ELPH-MCNC: 3.2 G/DL — LOW (ref 3.3–5.2)
ALP SERPL-CCNC: 101 U/L — SIGNIFICANT CHANGE UP (ref 40–120)
ALT FLD-CCNC: 17 U/L — SIGNIFICANT CHANGE UP
ANION GAP SERPL CALC-SCNC: 14 MMOL/L — SIGNIFICANT CHANGE UP (ref 5–17)
AST SERPL-CCNC: 16 U/L — SIGNIFICANT CHANGE UP
BILIRUB SERPL-MCNC: 0.4 MG/DL — SIGNIFICANT CHANGE UP (ref 0.4–2)
BUN SERPL-MCNC: 15.4 MG/DL — SIGNIFICANT CHANGE UP (ref 8–20)
CALCIUM SERPL-MCNC: 8.3 MG/DL — LOW (ref 8.6–10.2)
CHLORIDE SERPL-SCNC: 106 MMOL/L — SIGNIFICANT CHANGE UP (ref 98–107)
CO2 SERPL-SCNC: 22 MMOL/L — SIGNIFICANT CHANGE UP (ref 22–29)
CREAT SERPL-MCNC: 0.77 MG/DL — SIGNIFICANT CHANGE UP (ref 0.5–1.3)
GLUCOSE SERPL-MCNC: 136 MG/DL — HIGH (ref 70–99)
MAGNESIUM SERPL-MCNC: 2.2 MG/DL — SIGNIFICANT CHANGE UP (ref 1.6–2.6)
POTASSIUM SERPL-MCNC: 3 MMOL/L — LOW (ref 3.5–5.3)
POTASSIUM SERPL-SCNC: 3 MMOL/L — LOW (ref 3.5–5.3)
PROT SERPL-MCNC: 5.8 G/DL — LOW (ref 6.6–8.7)
SODIUM SERPL-SCNC: 142 MMOL/L — SIGNIFICANT CHANGE UP (ref 135–145)

## 2022-01-29 PROCEDURE — 99233 SBSQ HOSP IP/OBS HIGH 50: CPT

## 2022-01-29 PROCEDURE — 99231 SBSQ HOSP IP/OBS SF/LOW 25: CPT

## 2022-01-29 RX ORDER — LANOLIN ALCOHOL/MO/W.PET/CERES
3 CREAM (GRAM) TOPICAL ONCE
Refills: 0 | Status: COMPLETED | OUTPATIENT
Start: 2022-01-29 | End: 2022-01-29

## 2022-01-29 RX ORDER — HYDROCORTISONE 20 MG
20 TABLET ORAL
Refills: 0 | Status: DISCONTINUED | OUTPATIENT
Start: 2022-01-29 | End: 2022-01-30

## 2022-01-29 RX ORDER — HYDROCORTISONE 20 MG
50 TABLET ORAL EVERY 12 HOURS
Refills: 0 | Status: DISCONTINUED | OUTPATIENT
Start: 2022-01-29 | End: 2022-01-29

## 2022-01-29 RX ORDER — HYDROCORTISONE 20 MG
20 TABLET ORAL
Refills: 0 | Status: DISCONTINUED | OUTPATIENT
Start: 2022-01-29 | End: 2022-02-02

## 2022-01-29 RX ORDER — POTASSIUM CHLORIDE 20 MEQ
40 PACKET (EA) ORAL EVERY 4 HOURS
Refills: 0 | Status: COMPLETED | OUTPATIENT
Start: 2022-01-29 | End: 2022-01-29

## 2022-01-29 RX ADMIN — ATORVASTATIN CALCIUM 10 MILLIGRAM(S): 80 TABLET, FILM COATED ORAL at 23:02

## 2022-01-29 RX ADMIN — CEFTRIAXONE 100 MILLIGRAM(S): 500 INJECTION, POWDER, FOR SOLUTION INTRAMUSCULAR; INTRAVENOUS at 23:03

## 2022-01-29 RX ADMIN — Medication 40 MILLIEQUIVALENT(S): at 13:13

## 2022-01-29 RX ADMIN — Medication 3 MILLIGRAM(S): at 02:46

## 2022-01-29 RX ADMIN — HEPARIN SODIUM 5000 UNIT(S): 5000 INJECTION INTRAVENOUS; SUBCUTANEOUS at 05:48

## 2022-01-29 RX ADMIN — Medication 40 MILLIEQUIVALENT(S): at 17:27

## 2022-01-29 RX ADMIN — HEPARIN SODIUM 5000 UNIT(S): 5000 INJECTION INTRAVENOUS; SUBCUTANEOUS at 23:02

## 2022-01-29 RX ADMIN — Medication 20 MILLIGRAM(S): at 13:20

## 2022-01-29 RX ADMIN — Medication 125 MEQ/KG/HR: at 00:09

## 2022-01-29 RX ADMIN — SODIUM CHLORIDE 110 MILLILITER(S): 9 INJECTION, SOLUTION INTRAVENOUS at 00:09

## 2022-01-29 RX ADMIN — Medication 88 MICROGRAM(S): at 05:49

## 2022-01-29 RX ADMIN — Medication 50 MILLIGRAM(S): at 05:49

## 2022-01-29 NOTE — PROGRESS NOTE ADULT - ASSESSMENT
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 Brown), hx of hyperthyroidism s/p FELDMAN now hypothyroid, HTN p/w hypotension at 80/40 with lack of appetite, nausea, dry heaves and constipation. In the ER, patient noted to have mildred Cr 2.37, bp responding to iv fluids. pt. is on metoprolol 100 mg in am and 50 mg in pm, also on losartan 100 mg daily, and amlodipine 5 mg daily. Consult for adrenal insufficiency.  outpatient chart reviewed extensively  patient follows with our clinic and started to see us in 2020 but was last seen 7/2021  patient initially had incidentally noted L adrenal adenoma on MRI L spine. patient was symptomatic with weight gain over 3 years without changes in diet or exercise. further workup reviewed 2 L adrenal adenomas (1.5cm and 3cm) and biochemical studies showed elevated midnight salivary cortisol level 0.34 (should be <0.09), 1mg DST never suppressed under 10 and random cortisol normal with suppressed ACTH (>5). Other labs normal    1. Hypotension in setting of recent L adrenalectomy for Cushing's syndrome- likely due to overmedication with degree of adrenal insufficiency in setting of MILDRED  - Relative AI after surgery due to suppression of opposing adrenal gland  - Likely will need steroids with slow taper until HPA axis and R adrenal gland recovers  - BP improving- dc midodrine, will hold off adding HTN meds for now   - change hydrocort to 20mg at 8am and hydrocort to 20 at 2pm  - serum free cortisol still pending, detectable ACTH at 27    Follow up Endocrine apps:  March 2nd, 9:00 with Judith at [facility] in [facility]  May 10th, 9:40 with Dr. Patricia at [facility] in [facility]    2. Hypothyroidism  - Normal TFTs  - Continue LT4 88mcg 71F with Cushing's syndrome due to L adrenal adenoma s/p recent 1/3/22 L adrenalectomy (Dr. Brown), hx of hyperthyroidism s/p FELDMAN now hypothyroid, HTN p/w hypotension at 80/40 with lack of appetite, nausea, dry heaves and constipation. In the ER, patient noted to have mildred Cr 2.37, bp responding to iv fluids. pt. is on metoprolol 100 mg in am and 50 mg in pm, also on losartan 100 mg daily, and amlodipine 5 mg daily. Consult for adrenal insufficiency.  outpatient chart reviewed extensively  patient follows with our clinic and started to see us in 2020 but was last seen 7/2021  patient initially had incidentally noted L adrenal adenoma on MRI L spine. patient was symptomatic with weight gain over 3 years without changes in diet or exercise. further workup reviewed 2 L adrenal adenomas (1.5cm and 3cm) and biochemical studies showed elevated midnight salivary cortisol level 0.34 (should be <0.09), 1mg DST never suppressed under 10 and random cortisol normal with suppressed ACTH (>5). Other labs normal    1. Hypotension in setting of recent L adrenalectomy for Cushing's syndrome- likely due to overmedication with degree of adrenal insufficiency in setting of MLIDRED  - Relative AI after surgery due to suppression of opposing adrenal gland  - Likely will need steroids with slow taper until HPA axis and R adrenal gland recovers  - BP improving- dc midodrine, will hold off adding HTN meds for now   - change hydrocort to 20mg at 8am and hydrocort to 20 at 2pm  - serum free cortisol still pending, detectable ACTH at 27    Follow up Endocrine apps:  March 2nd, 9:00 with Judith at [facility]  May 10th, 9:40 with Dr. Patricia at [facility]    2. Hypothyroidism  - Normal TFTs  - Continue LT4 88mcg    3. MILDRED, Hypokalemia  - renal function normalized, replete K

## 2022-01-29 NOTE — PATIENT PROFILE ADULT - FALL HARM RISK - HARM RISK INTERVENTIONS
Assistance with ambulation/Assistance OOB with selected safe patient handling equipment/Communicate Risk of Fall with Harm to all staff/Discuss with provider need for PT consult/Monitor gait and stability/Reinforce activity limits and safety measures with patient and family/Tailored Fall Risk Interventions/Visual Cue: Yellow wristband and red socks/Bed in lowest position, wheels locked, appropriate side rails in place/Call bell, personal items and telephone in reach/Instruct patient to call for assistance before getting out of bed or chair/Non-slip footwear when patient is out of bed/[place] to call system/Physically safe environment - no spills, clutter or unnecessary equipment/Purposeful Proactive Rounding/Room/bathroom lighting operational, light cord in reach

## 2022-01-29 NOTE — PROGRESS NOTE ADULT - ASSESSMENT
pt. is a 70 y/o Female with a hx of L adrenal mass (initially noted to have Cushing syndrome, pre-op cortisol wnl),  s/p elective robotic L adrenalectomy on 1/3/22 with Dr. Brown that was well tolerated. Patient presents now to the ER  as her BP was low at home 80/40, felt lightheaded, no syncope, pt. also reports lack of appetite, po intake is not good as per pt as she does not feel like eating, no urine infection symptoms + dry heaves, Denies fever, chills. no cough, no sob, no cp. pt. also c/o constipation, and mild pain and fulness in lower abd. area. since her procedure. Patient was seen in the  [facility] surgery office last week and recommended to take daily Miralax which she has done every other day. Voiding adequately. pt. is seen by surgery team in the ER. PTt. noted to have mildred Cr 2.37, bp responding to iv fluids. pt. is on metoprolol 100 mg in am and 50 mg in pm, also on losartan 100 mg daily, and amlodipine 5 mg daily. As per pt. she was cleared by her cardiologist Dr. Tran for her recent surgery on 01/3/22.     Hypotension- possible multifactorial, medication related/dehydration/ adrenalectomy ? infection ? no elevated wbc, rectal temp 100.1 on admission   - on midodrine , aggressive hydration, will cut down ivf   - endocrine following - c/ w iv stress steroids tapering dose  -on iv rocephine. bl c/s ngtd    MILDRED,Improved  - will hold losartan and avoid any other nephrotoxic meds.   - nephro following   -bmp  -dc bicarb drip    Hypokalemia  -replaced  -bmp am      Constipation, unspecified type, surgery team following,   -having bm,abd soft  -resolved    S/P left adrenalectomy , endocrinology follow up, surgery on board. hydrocortisone.     hypothyroidism unspecified type, continue synthroid.     hyperlipidemia unspecified type. continue statin.     back pain - lidocaine patch   oxycodone prn     pt consult  - may need angeline    care of plan dw pt  amira rn

## 2022-01-30 LAB
ANION GAP SERPL CALC-SCNC: 14 MMOL/L — SIGNIFICANT CHANGE UP (ref 5–17)
BUN SERPL-MCNC: 12.8 MG/DL — SIGNIFICANT CHANGE UP (ref 8–20)
CALCIUM SERPL-MCNC: 8.2 MG/DL — LOW (ref 8.6–10.2)
CHLORIDE SERPL-SCNC: 109 MMOL/L — HIGH (ref 98–107)
CO2 SERPL-SCNC: 21 MMOL/L — LOW (ref 22–29)
CREAT SERPL-MCNC: 0.64 MG/DL — SIGNIFICANT CHANGE UP (ref 0.5–1.3)
GLUCOSE SERPL-MCNC: 102 MG/DL — HIGH (ref 70–99)
HCT VFR BLD CALC: 35.8 % — SIGNIFICANT CHANGE UP (ref 34.5–45)
HGB BLD-MCNC: 11.6 G/DL — SIGNIFICANT CHANGE UP (ref 11.5–15.5)
MAGNESIUM SERPL-MCNC: 1.8 MG/DL — SIGNIFICANT CHANGE UP (ref 1.6–2.6)
MCHC RBC-ENTMCNC: 29.2 PG — SIGNIFICANT CHANGE UP (ref 27–34)
MCHC RBC-ENTMCNC: 32.4 GM/DL — SIGNIFICANT CHANGE UP (ref 32–36)
MCV RBC AUTO: 90.2 FL — SIGNIFICANT CHANGE UP (ref 80–100)
PLATELET # BLD AUTO: 255 K/UL — SIGNIFICANT CHANGE UP (ref 150–400)
POTASSIUM SERPL-MCNC: 4.1 MMOL/L — SIGNIFICANT CHANGE UP (ref 3.5–5.3)
POTASSIUM SERPL-SCNC: 4.1 MMOL/L — SIGNIFICANT CHANGE UP (ref 3.5–5.3)
RBC # BLD: 3.97 M/UL — SIGNIFICANT CHANGE UP (ref 3.8–5.2)
RBC # FLD: 15.9 % — HIGH (ref 10.3–14.5)
SARS-COV-2 RNA SPEC QL NAA+PROBE: SIGNIFICANT CHANGE UP
SODIUM SERPL-SCNC: 144 MMOL/L — SIGNIFICANT CHANGE UP (ref 135–145)
WBC # BLD: 8.6 K/UL — SIGNIFICANT CHANGE UP (ref 3.8–10.5)
WBC # FLD AUTO: 8.6 K/UL — SIGNIFICANT CHANGE UP (ref 3.8–10.5)

## 2022-01-30 PROCEDURE — 99232 SBSQ HOSP IP/OBS MODERATE 35: CPT

## 2022-01-30 PROCEDURE — 99233 SBSQ HOSP IP/OBS HIGH 50: CPT

## 2022-01-30 RX ORDER — LANOLIN ALCOHOL/MO/W.PET/CERES
3 CREAM (GRAM) TOPICAL AT BEDTIME
Refills: 0 | Status: DISCONTINUED | OUTPATIENT
Start: 2022-01-30 | End: 2022-02-02

## 2022-01-30 RX ORDER — HYDROCORTISONE 20 MG
15 TABLET ORAL
Refills: 0 | Status: DISCONTINUED | OUTPATIENT
Start: 2022-01-30 | End: 2022-02-02

## 2022-01-30 RX ADMIN — Medication 3 MILLIGRAM(S): at 23:24

## 2022-01-30 RX ADMIN — SENNA PLUS 2 TABLET(S): 8.6 TABLET ORAL at 21:42

## 2022-01-30 RX ADMIN — SODIUM CHLORIDE 80 MILLILITER(S): 9 INJECTION, SOLUTION INTRAVENOUS at 05:43

## 2022-01-30 RX ADMIN — HEPARIN SODIUM 5000 UNIT(S): 5000 INJECTION INTRAVENOUS; SUBCUTANEOUS at 21:42

## 2022-01-30 RX ADMIN — HEPARIN SODIUM 5000 UNIT(S): 5000 INJECTION INTRAVENOUS; SUBCUTANEOUS at 05:43

## 2022-01-30 RX ADMIN — Medication 15 MILLIGRAM(S): at 13:24

## 2022-01-30 RX ADMIN — CEFTRIAXONE 100 MILLIGRAM(S): 500 INJECTION, POWDER, FOR SOLUTION INTRAMUSCULAR; INTRAVENOUS at 21:42

## 2022-01-30 RX ADMIN — Medication 88 MICROGRAM(S): at 05:43

## 2022-01-30 RX ADMIN — HEPARIN SODIUM 5000 UNIT(S): 5000 INJECTION INTRAVENOUS; SUBCUTANEOUS at 13:23

## 2022-01-30 RX ADMIN — Medication 30 MILLILITER(S): at 23:24

## 2022-01-30 RX ADMIN — ATORVASTATIN CALCIUM 10 MILLIGRAM(S): 80 TABLET, FILM COATED ORAL at 21:42

## 2022-01-30 RX ADMIN — POLYETHYLENE GLYCOL 3350 17 GRAM(S): 17 POWDER, FOR SOLUTION ORAL at 13:23

## 2022-01-30 RX ADMIN — Medication 20 MILLIGRAM(S): at 08:10

## 2022-01-30 NOTE — PROGRESS NOTE ADULT - ASSESSMENT
pt. is a 70 y/o Female with a hx of L adrenal mass (initially noted to have Cushing syndrome, pre-op cortisol wnl),  s/p elective robotic L adrenalectomy on 1/3/22 with Dr. Brown that was well tolerated. Patient presents now to the ER  as her BP was low at home 80/40, felt lightheaded, no syncope, pt. also reports lack of appetite, po intake is not good as per pt as she does not feel like eating, no urine infection symptoms + dry heaves, Denies fever, chills. no cough, no sob, no cp. pt. also c/o constipation, and mild pain and fulness in lower abd. area. since her procedure. Patient was seen in the  [facility] surgery office last week and recommended to take daily Miralax which she has done every other day. Voiding adequately. pt. is seen by surgery team in the ER. PTt. noted to have mildred Cr 2.37, bp responding to iv fluids. pt. is on metoprolol 100 mg in am and 50 mg in pm, also on losartan 100 mg daily, and amlodipine 5 mg daily. As per pt. she was cleared by her cardiologist Dr. Tran for her recent surgery on 01/3/22.     Hypotension- possible multifactorial, medication related/dehydration/ adrenalectomy ? infection ? no elevated wbc, rectal temp 100.1 on admission   -dc  midodrine , aggressive hydration, w cut down ivf   - endocrine following - c/ w  stress steroids tapering dose po   -on iv rocephine. bl c/s ngtd    MILDRED,Improved  - will hold losartan and avoid any other nephrotoxic meds.   - nephro following   -bmp  -dc bicarb drip    Hypokalemia  -replaced  -bmp am      Constipation, unspecified type, surgery team following,   -+ bm,abd soft  -resolved    S/P left adrenalectomy , endocrinology follow up, surgery on board. hydrocortisone.     hypothyroidism unspecified type, continue synthroid.     hyperlipidemia unspecified type. continue statin.     back pain - lidocaine patch   oxycodone prn     pt consult  - may need angeline  dc planning 24-48 hr  care of plan dw pt  amira rn

## 2022-01-30 NOTE — PROGRESS NOTE ADULT - ASSESSMENT
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 Brown), hx of hyperthyroidism s/p FELDMAN now hypothyroid, HTN p/w hypotension at 80/40 with lack of appetite, nausea, dry heaves and constipation. In the ER, patient noted to have mildred Cr 2.37, bp responding to iv fluids. pt. is on metoprolol 100 mg in am and 50 mg in pm, also on losartan 100 mg daily, and amlodipine 5 mg daily. Consult for adrenal insufficiency.  outpatient chart reviewed extensively  patient follows with our clinic and started to see us in 2020 but was last seen 7/2021  patient initially had incidentally noted L adrenal adenoma on MRI L spine. patient was symptomatic with weight gain over 3 years without changes in diet or exercise. further workup reviewed 2 L adrenal adenomas (1.5cm and 3cm) and biochemical studies showed elevated midnight salivary cortisol level 0.34 (should be <0.09), 1mg DST never suppressed under 10 and random cortisol normal with suppressed ACTH (>5). Other labs normal    1. Hypotension in setting of recent L adrenalectomy for Cushing's syndrome- likely due to overmedication with degree of adrenal insufficiency in setting of MILDRED  - Relative AI after surgery due to suppression of opposing adrenal gland  - Likely will need steroids with slow taper until HPA axis and R adrenal gland recovers  - BP improving- off midodrine, will hold off adding HTN meds for now   - continue hydrocort 20mg at 8am   - change to hydrocort 15 at 2pm  - serum free cortisol still pending, detectable ACTH at 27  - can probably dc with slow outpatient taper with above regimen    Follow up Endocrine apps:  March 2nd, 9:00 with Judith at [facility]  May 10th, 9:40 with Dr. Patricia at [facility]    2. Hypothyroidism  - Normal TFTs  - Continue LT4 88mcg    3. MILDRED, Hypokalemia  - renal function normalized, potassium normal

## 2022-01-31 LAB
ANION GAP SERPL CALC-SCNC: 10 MMOL/L — SIGNIFICANT CHANGE UP (ref 5–17)
BUN SERPL-MCNC: 10.5 MG/DL — SIGNIFICANT CHANGE UP (ref 8–20)
CALCIUM SERPL-MCNC: 8.5 MG/DL — LOW (ref 8.6–10.2)
CHLORIDE SERPL-SCNC: 109 MMOL/L — HIGH (ref 98–107)
CO2 SERPL-SCNC: 24 MMOL/L — SIGNIFICANT CHANGE UP (ref 22–29)
CREAT SERPL-MCNC: 0.52 MG/DL — SIGNIFICANT CHANGE UP (ref 0.5–1.3)
CULTURE RESULTS: SIGNIFICANT CHANGE UP
CULTURE RESULTS: SIGNIFICANT CHANGE UP
GLUCOSE SERPL-MCNC: 89 MG/DL — SIGNIFICANT CHANGE UP (ref 70–99)
HCT VFR BLD CALC: 36.1 % — SIGNIFICANT CHANGE UP (ref 34.5–45)
HGB BLD-MCNC: 11.7 G/DL — SIGNIFICANT CHANGE UP (ref 11.5–15.5)
MCHC RBC-ENTMCNC: 29.3 PG — SIGNIFICANT CHANGE UP (ref 27–34)
MCHC RBC-ENTMCNC: 32.4 GM/DL — SIGNIFICANT CHANGE UP (ref 32–36)
MCV RBC AUTO: 90.5 FL — SIGNIFICANT CHANGE UP (ref 80–100)
PLATELET # BLD AUTO: 231 K/UL — SIGNIFICANT CHANGE UP (ref 150–400)
POTASSIUM SERPL-MCNC: 3.9 MMOL/L — SIGNIFICANT CHANGE UP (ref 3.5–5.3)
POTASSIUM SERPL-SCNC: 3.9 MMOL/L — SIGNIFICANT CHANGE UP (ref 3.5–5.3)
RBC # BLD: 3.99 M/UL — SIGNIFICANT CHANGE UP (ref 3.8–5.2)
RBC # FLD: 15.6 % — HIGH (ref 10.3–14.5)
SODIUM SERPL-SCNC: 143 MMOL/L — SIGNIFICANT CHANGE UP (ref 135–145)
SPECIMEN SOURCE: SIGNIFICANT CHANGE UP
SPECIMEN SOURCE: SIGNIFICANT CHANGE UP
WBC # BLD: 6.72 K/UL — SIGNIFICANT CHANGE UP (ref 3.8–10.5)
WBC # FLD AUTO: 6.72 K/UL — SIGNIFICANT CHANGE UP (ref 3.8–10.5)

## 2022-01-31 PROCEDURE — 99232 SBSQ HOSP IP/OBS MODERATE 35: CPT

## 2022-01-31 RX ORDER — METOPROLOL TARTRATE 50 MG
12.5 TABLET ORAL
Refills: 0 | Status: DISCONTINUED | OUTPATIENT
Start: 2022-01-31 | End: 2022-02-01

## 2022-01-31 RX ADMIN — HEPARIN SODIUM 5000 UNIT(S): 5000 INJECTION INTRAVENOUS; SUBCUTANEOUS at 21:31

## 2022-01-31 RX ADMIN — Medication 88 MICROGRAM(S): at 05:22

## 2022-01-31 RX ADMIN — Medication 20 MILLIGRAM(S): at 08:55

## 2022-01-31 RX ADMIN — Medication 15 MILLIGRAM(S): at 14:06

## 2022-01-31 RX ADMIN — HEPARIN SODIUM 5000 UNIT(S): 5000 INJECTION INTRAVENOUS; SUBCUTANEOUS at 05:22

## 2022-01-31 RX ADMIN — LACTULOSE 20 GRAM(S): 10 SOLUTION ORAL at 05:22

## 2022-01-31 RX ADMIN — SENNA PLUS 2 TABLET(S): 8.6 TABLET ORAL at 21:31

## 2022-01-31 RX ADMIN — ATORVASTATIN CALCIUM 10 MILLIGRAM(S): 80 TABLET, FILM COATED ORAL at 21:31

## 2022-01-31 RX ADMIN — CEFTRIAXONE 100 MILLIGRAM(S): 500 INJECTION, POWDER, FOR SOLUTION INTRAMUSCULAR; INTRAVENOUS at 21:40

## 2022-01-31 RX ADMIN — Medication 12.5 MILLIGRAM(S): at 14:06

## 2022-01-31 RX ADMIN — Medication 3 MILLIGRAM(S): at 21:31

## 2022-01-31 RX ADMIN — HEPARIN SODIUM 5000 UNIT(S): 5000 INJECTION INTRAVENOUS; SUBCUTANEOUS at 14:06

## 2022-01-31 NOTE — PHYSICAL THERAPY INITIAL EVALUATION ADULT - GAIT PATTERN USED, PT EVAL
decreased gait velocity and activity tolerance, decreased raghav step length, assist for safety due to unsteadiness

## 2022-01-31 NOTE — PROGRESS NOTE ADULT - ASSESSMENT
pt. is a 70 y/o Female with a hx of L adrenal mass (initially noted to have Cushing syndrome, pre-op cortisol wnl),  s/p elective robotic L adrenalectomy on 1/3/22 with Dr. Brown that was well tolerated. Patient presents now to the ER  as her BP was low at home 80/40, felt lightheaded, no syncope, pt. also reports lack of appetite, po intake is not good as per pt as she does not feel like eating, no urine infection symptoms + dry heaves, Denies fever, chills. no cough, no sob, no cp. pt. also c/o constipation, and mild pain and fulness in lower abd. area. since her procedure. Patient was seen in the  [facility] surgery office last week and recommended to take daily Miralax which she has done every other day. Voiding adequately. pt. is seen by surgery team in the ER. PTt. noted to have mildred Cr 2.37, bp responding to iv fluids. pt. is on metoprolol 100 mg in am and 50 mg in pm, also on losartan 100 mg daily, and amlodipine 5 mg daily. As per pt. she was cleared by her cardiologist Dr. Tran for her recent surgery on 01/3/22.         Hypotension- possible multifactorial, medication related/dehydration/ adrenalectomy ? infection ? no elevated wbc, rectal temp 100.1 on admission   -BP HIGH NOW, WILL START SMALL DOSE OF BB ,will adjust meds as per bp . dc ivf  -dc  midodrine , aggressive hydration, w cut down ivf   - endocrine following - c/ w  stress steroids tapering dose po   -on iv rocephine. bl c/s ngtd    MILDRED,Improved  - will hold losartan and avoid any other nephrotoxic meds.   - nephro following   -bmp  -dc bicarb drip    Hypokalemia  -replaced  -bmp am      Constipation, unspecified type, surgery team following,   -+ bm,abd soft  -resolved    S/P left adrenalectomy , endocrinology follow up, surgery on board. hydrocortisone.     hypothyroidism unspecified type, continue synthroid.     hyperlipidemia unspecified type. continue statin.     back pain - lidocaine patch   oxycodone prn     pt consult  - pending PT EVAL. may need angeline  dc planning planning amira ramirez  care of plan dw pt  dw rn

## 2022-01-31 NOTE — PROGRESS NOTE ADULT - ATTENDING COMMENTS
Agree  w plan above. COnt HC 20 mg am and 15 mg PM.  She will need to be weaned off steroids at some point but it will be a process of duration.

## 2022-01-31 NOTE — PROGRESS NOTE ADULT - ASSESSMENT
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 Brown), hx of hyperthyroidism s/p FELDMAN now hypothyroid, HTN p/w hypotension at 80/40 with lack of appetite, nausea, dry heaves and constipation. In the ER, patient noted to have mildred Cr 2.37, bp responding to iv fluids. pt. is on metoprolol 100 mg in am and 50 mg in pm, also on losartan 100 mg daily, and amlodipine 5 mg daily. Consult for adrenal insufficiency.  outpatient chart reviewed extensively  patient follows with our clinic and started to see us in 2020 but was last seen 7/2021  patient initially had incidentally noted L adrenal adenoma on MRI L spine. patient was symptomatic with weight gain over 3 years without changes in diet or exercise. further workup reviewed 2 L adrenal adenomas (1.5cm and 3cm) and biochemical studies showed elevated midnight salivary cortisol level 0.34 (should be <0.09), 1mg DST never suppressed under 10 and random cortisol normal with suppressed ACTH (>5). Other labs normal    1. Hypotension in setting of recent L adrenalectomy for Cushing's syndrome- likely due to overmedication with degree of adrenal insufficiency in setting of MILDRED  - Relative AI after surgery due to suppression of opposing adrenal gland  - Likely will need steroids with slow taper until HPA axis and R adrenal gland recovers  - BP improving- off midodrine, will hold off adding HTN meds for now   - Continue hydrocort 20mg at 8am   - Continue hydrocort 15mg at 2pm  - Serum free cortisol still pending, detectable ACTH at 27  - Can probably dc with slow outpatient taper with above regimen    Follow up Endocrine apps:  March 2nd, 9:00 with Judith at [facility]  May 10th, 9:40 with Dr. Patricia at [facility]    2. Hypothyroidism  - Normal TFTs  - Continue LT4 88mcg    3. MILDRED - improved  - Renal following

## 2022-02-01 ENCOUNTER — TRANSCRIPTION ENCOUNTER (OUTPATIENT)
Age: 72
End: 2022-02-01

## 2022-02-01 LAB
HCT VFR BLD CALC: 37.5 % — SIGNIFICANT CHANGE UP (ref 34.5–45)
HGB BLD-MCNC: 12.2 G/DL — SIGNIFICANT CHANGE UP (ref 11.5–15.5)
MCHC RBC-ENTMCNC: 29.4 PG — SIGNIFICANT CHANGE UP (ref 27–34)
MCHC RBC-ENTMCNC: 32.5 GM/DL — SIGNIFICANT CHANGE UP (ref 32–36)
MCV RBC AUTO: 90.4 FL — SIGNIFICANT CHANGE UP (ref 80–100)
PLATELET # BLD AUTO: 236 K/UL — SIGNIFICANT CHANGE UP (ref 150–400)
RBC # BLD: 4.15 M/UL — SIGNIFICANT CHANGE UP (ref 3.8–5.2)
RBC # FLD: 15.9 % — HIGH (ref 10.3–14.5)
WBC # BLD: 6.83 K/UL — SIGNIFICANT CHANGE UP (ref 3.8–10.5)
WBC # FLD AUTO: 6.83 K/UL — SIGNIFICANT CHANGE UP (ref 3.8–10.5)

## 2022-02-01 PROCEDURE — 99232 SBSQ HOSP IP/OBS MODERATE 35: CPT

## 2022-02-01 RX ORDER — ACETAMINOPHEN 500 MG
2 TABLET ORAL
Qty: 90 | Refills: 0
Start: 2022-02-01 | End: 2022-02-15

## 2022-02-01 RX ORDER — HYDROCORTISONE 20 MG
3 TABLET ORAL
Qty: 0 | Refills: 0 | DISCHARGE
Start: 2022-02-01

## 2022-02-01 RX ORDER — HYDROCORTISONE 20 MG
1 TABLET ORAL
Qty: 0 | Refills: 0 | DISCHARGE
Start: 2022-02-01

## 2022-02-01 RX ORDER — LEVOTHYROXINE SODIUM 125 MCG
1 TABLET ORAL
Qty: 0 | Refills: 0 | DISCHARGE

## 2022-02-01 RX ORDER — CEFTRIAXONE 500 MG/1
1000 INJECTION, POWDER, FOR SOLUTION INTRAMUSCULAR; INTRAVENOUS ONCE
Refills: 0 | Status: COMPLETED | OUTPATIENT
Start: 2022-02-01 | End: 2022-02-01

## 2022-02-01 RX ORDER — LIDOCAINE 4 G/100G
1 CREAM TOPICAL
Qty: 0 | Refills: 0 | DISCHARGE
Start: 2022-02-01

## 2022-02-01 RX ORDER — LOSARTAN POTASSIUM 100 MG/1
1 TABLET, FILM COATED ORAL
Qty: 0 | Refills: 0 | DISCHARGE

## 2022-02-01 RX ORDER — POLYETHYLENE GLYCOL 3350 17 G/17G
17 POWDER, FOR SOLUTION ORAL
Qty: 0 | Refills: 0 | DISCHARGE
Start: 2022-02-01

## 2022-02-01 RX ORDER — METOPROLOL TARTRATE 50 MG
25 TABLET ORAL
Refills: 0 | Status: DISCONTINUED | OUTPATIENT
Start: 2022-02-01 | End: 2022-02-01

## 2022-02-01 RX ORDER — ONDANSETRON 8 MG/1
1 TABLET, FILM COATED ORAL
Qty: 12 | Refills: 0
Start: 2022-02-01 | End: 2022-02-03

## 2022-02-01 RX ORDER — METOPROLOL TARTRATE 50 MG
25 TABLET ORAL
Refills: 0 | Status: DISCONTINUED | OUTPATIENT
Start: 2022-02-01 | End: 2022-02-02

## 2022-02-01 RX ORDER — SENNA PLUS 8.6 MG/1
2 TABLET ORAL
Qty: 0 | Refills: 0 | DISCHARGE
Start: 2022-02-01

## 2022-02-01 RX ORDER — METOPROLOL TARTRATE 50 MG
1 TABLET ORAL
Qty: 0 | Refills: 0 | DISCHARGE

## 2022-02-01 RX ORDER — METOPROLOL TARTRATE 50 MG
0.5 TABLET ORAL
Qty: 0 | Refills: 0 | DISCHARGE

## 2022-02-01 RX ORDER — LEVOTHYROXINE SODIUM 125 MCG
1 TABLET ORAL
Qty: 0 | Refills: 0 | DISCHARGE
Start: 2022-02-01

## 2022-02-01 RX ORDER — AMLODIPINE BESYLATE 2.5 MG/1
1 TABLET ORAL
Qty: 0 | Refills: 0 | DISCHARGE

## 2022-02-01 RX ORDER — HEPARIN SODIUM 5000 [USP'U]/ML
5000 INJECTION INTRAVENOUS; SUBCUTANEOUS
Qty: 0 | Refills: 0 | DISCHARGE
Start: 2022-02-01

## 2022-02-01 RX ADMIN — LIDOCAINE 1 PATCH: 4 CREAM TOPICAL at 11:22

## 2022-02-01 RX ADMIN — HEPARIN SODIUM 5000 UNIT(S): 5000 INJECTION INTRAVENOUS; SUBCUTANEOUS at 13:14

## 2022-02-01 RX ADMIN — ATORVASTATIN CALCIUM 10 MILLIGRAM(S): 80 TABLET, FILM COATED ORAL at 21:38

## 2022-02-01 RX ADMIN — HEPARIN SODIUM 5000 UNIT(S): 5000 INJECTION INTRAVENOUS; SUBCUTANEOUS at 21:38

## 2022-02-01 RX ADMIN — Medication 88 MICROGRAM(S): at 05:15

## 2022-02-01 RX ADMIN — Medication 3 MILLIGRAM(S): at 21:39

## 2022-02-01 RX ADMIN — LIDOCAINE 1 PATCH: 4 CREAM TOPICAL at 22:42

## 2022-02-01 RX ADMIN — Medication 12.5 MILLIGRAM(S): at 05:15

## 2022-02-01 RX ADMIN — HEPARIN SODIUM 5000 UNIT(S): 5000 INJECTION INTRAVENOUS; SUBCUTANEOUS at 05:15

## 2022-02-01 RX ADMIN — CEFTRIAXONE 100 MILLIGRAM(S): 500 INJECTION, POWDER, FOR SOLUTION INTRAMUSCULAR; INTRAVENOUS at 11:19

## 2022-02-01 RX ADMIN — Medication 15 MILLIGRAM(S): at 13:13

## 2022-02-01 RX ADMIN — LIDOCAINE 1 PATCH: 4 CREAM TOPICAL at 19:00

## 2022-02-01 RX ADMIN — SENNA PLUS 2 TABLET(S): 8.6 TABLET ORAL at 21:38

## 2022-02-01 RX ADMIN — LACTULOSE 20 GRAM(S): 10 SOLUTION ORAL at 05:16

## 2022-02-01 RX ADMIN — Medication 25 MILLIGRAM(S): at 16:37

## 2022-02-01 RX ADMIN — Medication 20 MILLIGRAM(S): at 08:32

## 2022-02-01 NOTE — DISCHARGE NOTE PROVIDER - NSDCMRMEDTOKEN_GEN_ALL_CORE_FT
acetaminophen 325 mg oral tablet: 2 tab(s) orally every 8 hours, As Needed -for mild pain   heparin: 5000 unit(s) subcutaneous every 12 hours  hydrocortisone 20 mg oral tablet: 1 tab(s) orally once a day 8 am  hydrocortisone 5 mg oral tablet: 3 tab(s) orally once a day 14 pm  levothyroxine 88 mcg (0.088 mg) oral tablet: 1 tab(s) orally once a day  lidocaine 4% topical film: Apply topically to affected area once a day  Metoprolol Succinate ER 25 mg oral tablet, extended release: 1 tab(s) orally 2 times a day  polyethylene glycol 3350 oral powder for reconstitution: 17 gram(s) orally once a day  pravastatin 40 mg oral tablet: 1 tab(s) orally once a day  senna oral tablet: 2 tab(s) orally once a day (at bedtime)  Vitamin D3 1250 mcg (50,000 intl units) oral capsule: 1 cap(s) orally once a week  Zofran 4 mg oral tablet: 1 tab(s) orally every 6 hours, As Needed -for nausea

## 2022-02-01 NOTE — DISCHARGE NOTE PROVIDER - NSDCFUSCHEDAPPT_GEN_ALL_CORE_FT
[facility] ; 02/10/2022 ; [facility]PP Surgonc [address] [facility] ; 03/02/2022 ; NPP Endocrin [address] [facility]

## 2022-02-01 NOTE — DISCHARGE NOTE PROVIDER - CARE PROVIDERS DIRECT ADDRESSES
[email or web address]

## 2022-02-01 NOTE — DISCHARGE NOTE PROVIDER - NSDCCPCAREPLAN_GEN_ALL_CORE_FT
PRINCIPAL DISCHARGE DIAGNOSIS  Diagnosis: MILDRED (acute kidney injury)  Assessment and Plan of Treatment:       SECONDARY DISCHARGE DIAGNOSES  Diagnosis: Constipation  Assessment and Plan of Treatment:

## 2022-02-01 NOTE — DISCHARGE NOTE PROVIDER - HOSPITAL COURSE
70 y/o Female with a hx of L adrenal mass (initially noted to have Cushing syndrome, pre-op cortisol wnl),  s/p elective robotic L adrenalectomy on 1/3/22 with Dr. Brown that was well tolerated. Patient presents now to the [facility] ED due to hypotension. In the ED, to have elevated Cr. Patient admitted for further evaluation of hypotension and MILDRED. Patient received IVFs for MILDRED and blood pressures had normalized.          72 y/o Female with a hx of L adrenal mass (initially noted to have Cushing syndrome, pre-op cortisol wnl),  s/p elective robotic L adrenalectomy on 1/3/22 with Dr. Brown that was well tolerated. Patient presents now to the [facility] ED due to hypotension. In the ED, to have elevated Cr. Patient admitted for further evaluation of hypotension. Patient received IVFs for MILDRED and given midodrine for low BP. BP and SCr have now normalized. Patient to follow up outpatient with endocrine (Dr. Fong). At this time, patient is medically stable for discharge home.     Vital Signs Last 24 Hrs  T(C): 36.8 (01 Feb 2022 09:58), Max: 37.1 (31 Jan 2022 11:14)  T(F): 98.3 (01 Feb 2022 09:58), Max: 98.7 (31 Jan 2022 11:14)  HR: 67 (01 Feb 2022 09:58) (67 - 101)  BP: 112/68 (01 Feb 2022 09:58) (112/68 - 170/92)  BP(mean): --  RR: 18 (01 Feb 2022 09:58) (18 - 19)  SpO2: 96% (01 Feb 2022 09:58) (96% - 96%)    Length of discharge 33mins         70 y/o Female with a hx of L adrenal mass (initially noted to have Cushing syndrome, pre-op cortisol wnl),  s/p elective robotic L adrenalectomy on 1/3/22 with Dr. Brown that was well tolerated. Patient presents now to the [facility] ED due to hypotension. In the ED, to have elevated Cr. Patient admitted for further evaluation of hypotension. Patient received IVFs for MILDRED and given midodrine for low BP. BP and SCr have now normalized. Patient to follow up outpatient with endocrine (Dr. Fong). At this time, patient is medically stable for discharge home.       Length of discharge 36mins

## 2022-02-01 NOTE — PROGRESS NOTE ADULT - ASSESSMENT
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 Brown), hx of hyperthyroidism s/p FELDMAN now hypothyroid, HTN p/w hypotension at 80/40 with lack of appetite, nausea, dry heaves and constipation. In the ER, patient noted to have mildred Cr 2.37, bp responding to iv fluids. pt. is on metoprolol 100 mg in am and 50 mg in pm, also on losartan 100 mg daily, and amlodipine 5 mg daily. Consult for adrenal insufficiency.  outpatient chart reviewed extensively  patient follows with our clinic and started to see us in 2020 but was last seen 7/2021  patient initially had incidentally noted L adrenal adenoma on MRI L spine. patient was symptomatic with weight gain over 3 years without changes in diet or exercise. further workup reviewed 2 L adrenal adenomas (1.5cm and 3cm) and biochemical studies showed elevated midnight salivary cortisol level 0.34 (should be <0.09), 1mg DST never suppressed under 10 and random cortisol normal with suppressed ACTH (>5). Other labs normal    1. Hypotension in setting of recent L adrenalectomy for Cushing's syndrome- likely due to overmedication with degree of adrenal insufficiency in setting of MILDRED  - Continue hydrocort 20mg at 8am   - Continue hydrocort 15mg at 2pm    Follow up Endocrine apps:  March 2nd, 9:00 with Judith at [facility] [place]  May 10th, 9:40 with Dr. Patricia at [facility]    2. Hypothyroidism  - Normal TFTs  - Continue LT4 88mcg    3. MILDRED - improved  - Renal following

## 2022-02-01 NOTE — DISCHARGE NOTE PROVIDER - CARE PROVIDER_API CALL
Katina Fong)  EndocrinologyMetabDiabetes  [address] [place], [place] [id number]  Phone: [phone]  Fax: [phone]  Follow Up Time:     Shyam Brown)  Complex General Surgical Oncology; Surgery; Surgical Oncology  [address] [place] [address]  Phone: [phone]  Fax: [phone]  Follow Up Time:

## 2022-02-01 NOTE — PROGRESS NOTE ADULT - ATTENDING COMMENTS
AGree w plan above. She seems to be in good shape today. Weak and unable to ambulate without support .   Discussed w patient to followup with endocrine as outpatient for steroids management.

## 2022-02-01 NOTE — PROGRESS NOTE ADULT - ASSESSMENT
pt. is a 72 y/o Female with a hx of L adrenal mass (initially noted to have Cushing syndrome, pre-op cortisol wnl),  s/p elective robotic L adrenalectomy on 1/3/22 with Dr. Brown that was well tolerated. Patient presents now to the ER  as her BP was low at home 80/40, felt lightheaded, no syncope, pt. also reports lack of appetite, po intake is not good as per pt as she does not feel like eating, no urine infection symptoms + dry heaves, Denies fever, chills. no cough, no sob, no cp. pt. also c/o constipation, and mild pain and fulness in lower abd. area. since her procedure. Patient was seen in the  [facility] surgery office last week and recommended to take daily Miralax which she has done every other day. Voiding adequately. pt. is seen by surgery team in the ER. PTt. noted to have mildred Cr 2.37, bp responding to iv fluids. pt was  on metoprolol 100 mg in am and 50 mg in pm, also on losartan 100 mg daily, and amlodipine 5 mg daily. As per pt. she was cleared by her cardiologist Dr. Tran for her recent surgery on 01/3/22. Bp meds was on hold as pt was hypotensive, now bp trending up. Resume bb, will adjust dose. Hydrocortisone po doses per endocrine. Pt will f/u with endocrine out pt for taper doses. Tolearating po diet. Dc to angeline. waiting for placement.        Hypotension-resolved  - possible multifactorial, medication related/dehydration/ adrenalectomy ? infection ? no elevated wbc, rectal temp 100.1 on admission   -BP HIGH.  NOW  START  BB ,will adjust meds as per bp .   -dc  midodrine , aggressive hydration, w cut down ivf   - endocrine following - c/ w  stress steroids tapering dose po   -s/p 7 days of  iv rocephine. bl c/s ngtd    MILDRED,Improved  - will hold losartan and avoid any other nephrotoxic meds.   - nephro following   -bmp  -dc bicarb drip    Hypokalemia  -replaced  -stable      Constipation, unspecified type, surgery team following,   -+ bm,abd soft  -resolved    S/P left adrenalectomy , endocrinology follow up, surgery on board. hydrocortisone.     hypothyroidism unspecified type, continue synthroid.     hyperlipidemia unspecified type. continue statin.     back pain - lidocaine patch   prn tylenol    pt consult  disposition: ANGELINE STAFFORD  FOR SAFE DISCHARGE  care of plan amira pt-Pt agreed  amira rn

## 2022-02-02 ENCOUNTER — TRANSCRIPTION ENCOUNTER (OUTPATIENT)
Age: 72
End: 2022-02-02

## 2022-02-02 VITALS
OXYGEN SATURATION: 98 % | DIASTOLIC BLOOD PRESSURE: 69 MMHG | TEMPERATURE: 98 F | HEART RATE: 89 BPM | RESPIRATION RATE: 18 BRPM | SYSTOLIC BLOOD PRESSURE: 143 MMHG

## 2022-02-02 LAB — SARS-COV-2 RNA SPEC QL NAA+PROBE: SIGNIFICANT CHANGE UP

## 2022-02-02 PROCEDURE — U0003: CPT

## 2022-02-02 PROCEDURE — 83605 ASSAY OF LACTIC ACID: CPT

## 2022-02-02 PROCEDURE — 93005 ELECTROCARDIOGRAM TRACING: CPT

## 2022-02-02 PROCEDURE — U0005: CPT

## 2022-02-02 PROCEDURE — 36415 COLL VENOUS BLD VENIPUNCTURE: CPT

## 2022-02-02 PROCEDURE — 99285 EMERGENCY DEPT VISIT HI MDM: CPT

## 2022-02-02 PROCEDURE — 85025 COMPLETE CBC W/AUTO DIFF WBC: CPT

## 2022-02-02 PROCEDURE — 80053 COMPREHEN METABOLIC PANEL: CPT

## 2022-02-02 PROCEDURE — 86803 HEPATITIS C AB TEST: CPT

## 2022-02-02 PROCEDURE — 76770 US EXAM ABDO BACK WALL COMP: CPT

## 2022-02-02 PROCEDURE — 83735 ASSAY OF MAGNESIUM: CPT

## 2022-02-02 PROCEDURE — 84436 ASSAY OF TOTAL THYROXINE: CPT

## 2022-02-02 PROCEDURE — 71045 X-RAY EXAM CHEST 1 VIEW: CPT

## 2022-02-02 PROCEDURE — 96375 TX/PRO/DX INJ NEW DRUG ADDON: CPT

## 2022-02-02 PROCEDURE — 97163 PT EVAL HIGH COMPLEX 45 MIN: CPT

## 2022-02-02 PROCEDURE — 85610 PROTHROMBIN TIME: CPT

## 2022-02-02 PROCEDURE — 82570 ASSAY OF URINE CREATININE: CPT

## 2022-02-02 PROCEDURE — 82533 TOTAL CORTISOL: CPT

## 2022-02-02 PROCEDURE — 96374 THER/PROPH/DIAG INJ IV PUSH: CPT

## 2022-02-02 PROCEDURE — 81001 URINALYSIS AUTO W/SCOPE: CPT

## 2022-02-02 PROCEDURE — 74018 RADEX ABDOMEN 1 VIEW: CPT

## 2022-02-02 PROCEDURE — 85730 THROMBOPLASTIN TIME PARTIAL: CPT

## 2022-02-02 PROCEDURE — 80048 BASIC METABOLIC PNL TOTAL CA: CPT

## 2022-02-02 PROCEDURE — 96372 THER/PROPH/DIAG INJ SC/IM: CPT

## 2022-02-02 PROCEDURE — 76775 US EXAM ABDO BACK WALL LIM: CPT

## 2022-02-02 PROCEDURE — 84133 ASSAY OF URINE POTASSIUM: CPT

## 2022-02-02 PROCEDURE — 93926 LOWER EXTREMITY STUDY: CPT

## 2022-02-02 PROCEDURE — 85027 COMPLETE CBC AUTOMATED: CPT

## 2022-02-02 PROCEDURE — 84443 ASSAY THYROID STIM HORMONE: CPT

## 2022-02-02 PROCEDURE — 84480 ASSAY TRIIODOTHYRONINE (T3): CPT

## 2022-02-02 PROCEDURE — 84300 ASSAY OF URINE SODIUM: CPT

## 2022-02-02 PROCEDURE — 87040 BLOOD CULTURE FOR BACTERIA: CPT

## 2022-02-02 PROCEDURE — 83935 ASSAY OF URINE OSMOLALITY: CPT

## 2022-02-02 PROCEDURE — 99232 SBSQ HOSP IP/OBS MODERATE 35: CPT

## 2022-02-02 PROCEDURE — 82024 ASSAY OF ACTH: CPT

## 2022-02-02 PROCEDURE — 84156 ASSAY OF PROTEIN URINE: CPT

## 2022-02-02 PROCEDURE — 99239 HOSP IP/OBS DSCHRG MGMT >30: CPT

## 2022-02-02 RX ADMIN — HEPARIN SODIUM 5000 UNIT(S): 5000 INJECTION INTRAVENOUS; SUBCUTANEOUS at 13:13

## 2022-02-02 RX ADMIN — Medication 20 MILLIGRAM(S): at 08:46

## 2022-02-02 RX ADMIN — HEPARIN SODIUM 5000 UNIT(S): 5000 INJECTION INTRAVENOUS; SUBCUTANEOUS at 05:02

## 2022-02-02 RX ADMIN — Medication 25 MILLIGRAM(S): at 05:02

## 2022-02-02 RX ADMIN — Medication 88 MICROGRAM(S): at 05:02

## 2022-02-02 RX ADMIN — LACTULOSE 20 GRAM(S): 10 SOLUTION ORAL at 05:02

## 2022-02-02 RX ADMIN — Medication 15 MILLIGRAM(S): at 13:13

## 2022-02-02 NOTE — PROGRESS NOTE ADULT - ASSESSMENT
pt. is a 72 y/o Female with a hx of L adrenal mass (initially noted to have Cushing syndrome, pre-op cortisol wnl),  s/p elective robotic L adrenalectomy on 1/3/22 with Dr. Brown that was well tolerated. Patient presents now to the ER  as her BP was low at home 80/40, felt lightheaded, no syncope, pt. also reports lack of appetite, po intake is not good as per pt as she does not feel like eating, no urine infection symptoms + dry heaves, Denies fever, chills. no cough, no sob, no cp. pt. also c/o constipation, and mild pain and fulness in lower abd. area. since her procedure. Patient was seen in the  [facility] surgery office last week and recommended to take daily Miralax which she has done every other day. Voiding adequately. pt. is seen by surgery team in the ER. PTt. noted to have mildred Cr 2.37, bp responding to iv fluids. pt was  on metoprolol 100 mg in am and 50 mg in pm, also on losartan 100 mg daily, and amlodipine 5 mg daily. As per pt. she was cleared by her cardiologist Dr. Tran for her recent surgery on 01/3/22. Bp meds was on hold as pt was hypotensive, now bp trending up. Resume bb, will adjust dose. Hydrocortisone po doses per endocrine. Pt will f/u with endocrine out pt for taper doses. Tolearating po diet. Dc to angeline. waiting for placement.        Hypotension-resolved  - possible multifactorial, medication related/dehydration/ adrenalectomy ? infection ? no elevated wbc, rectal temp 100.1 on admission   -continue  BB ,will adjust meds as per bp .   -dc  midodrine , aggressive hydration, w cut down ivf   - endocrine following - c/ w  stress steroids tapering dose po   -s/p 7 days of  iv rocephine. bl c/s ngtd    MILDRED,Improved  - will hold losartan and avoid any other nephrotoxic meds.     -dc bicarb drip    Hypokalemia  -replaced  -stable      Constipation, unspecified type, surgery team following,   -+ bm,abd soft  -resolved    S/P left adrenalectomy , endocrinology follow up, surgery on board. hydrocortisone.     hypothyroidism unspecified type, continue synthroid.     hyperlipidemia unspecified type. continue statin.     back pain - lidocaine patch   prn tylenol    pt consult  disposition: ANGELINE waiting for JAZZY  AMIRA STAFFORD  FOR SAFE DISCHARGE  care of plan amira pt-Pt agreed  amira rn

## 2022-02-02 NOTE — PROGRESS NOTE ADULT - PROVIDER SPECIALTY LIST ADULT
Endocrinology
Endocrinology
Hospitalist
Hospitalist
Surgery
Endocrinology
Hospitalist
Nephrology
Surgery
Endocrinology
Endocrinology
Hospitalist
Internal Medicine
Nephrology
Endocrinology
Hospitalist
Internal Medicine

## 2022-02-02 NOTE — PROGRESS NOTE ADULT - SUBJECTIVE AND OBJECTIVE BOX
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 Brown), hx of hyperthyroidism s/p FELDMAN now hypothyroid, HTN p/w hypotension at 80/40 with lack of appetite, nausea, dry heaves and constipation.     In the ER, patient noted to have AK I - SCr 2.37, bp responding to iv fluids. pt. is on metoprolol 100 mg in am and 50 mg in pm, also on losartan 100 mg daily, and amlodipine 5 mg daily.     patient initially had incidentally noted L adrenal adenoma on MRI L spine. patient was symptomatic with weight gain over 3 years without changes in diet or exercise. further workup reviewed 2 L adrenal adenomas (1.5cm and 3cm) and biochemical studies showed elevated midnight salivary cortisol level 0.34 (should be <0.09), 1mg DST never suppressed under 10 and random cortisol normal with suppressed ACTH (>5). Other labs normal    Hypotension in  the setting of recent L adrenalectomy , Hypothyroid on Supplements,    On  Hydrocortisone 50mg Q8 Hours,      Hypokalemia -MILDRED ( Pre Renal )    Rec :    Urine Indices, US Kidneys,     Avoidance of nephrotoxins/nephrotoxin medication adjustment  Medication dosage adjustment for kidney function  Continuous IVF administration and testing of fluid responsiveness for 6 hours ,  Close monitoring of serum Creatinine and UO  Acid-base, electrolyte and albumin status management  Avoidance of hyperglycemia ,  Consider alternatives to radiocontrast administration  Optimizing hemodynamics:    Full consult to Follow,       
CC: Follow up AI management     INTERVAL HPI/OVERNIGHT EVENTS:  No acute events    ROS: Patient denies chest pain, SOB, abd pain, N/V, or any other complaints. All remainder ROS negative.    MEDICATIONS  (STANDING):  atorvastatin 10 milliGRAM(s) Oral at bedtime  cefTRIAXone   IVPB      cefTRIAXone   IVPB 1000 milliGRAM(s) IV Intermittent every 24 hours  heparin   Injectable 5000 Unit(s) SubCutaneous every 8 hours  hydrocortisone 20 milliGRAM(s) Oral <User Schedule>  hydrocortisone 15 milliGRAM(s) Oral <User Schedule>  lactated ringers. 1000 milliLiter(s) (80 mL/Hr) IV Continuous <Continuous>  lactulose Syrup 20 Gram(s) Oral two times a day  levothyroxine 88 MICROGram(s) Oral daily  lidocaine   4% Patch 1 Patch Transdermal daily  melatonin 3 milliGRAM(s) Oral at bedtime  polyethylene glycol 3350 17 Gram(s) Oral daily  senna 2 Tablet(s) Oral at bedtime    MEDICATIONS  (PRN):  acetaminophen     Tablet .. 650 milliGRAM(s) Oral every 6 hours PRN Temp greater or equal to 38C (100.4F), Mild Pain (1 - 3), Moderate Pain (4 - 6)  ondansetron Injectable 4 milliGRAM(s) IV Push every 6 hours PRN Nausea and/or Vomiting  oxyCODONE    IR 5 milliGRAM(s) Oral every 6 hours PRN Moderate Pain (4 - 6)    Allergies  iodinated radiocontrast agents (Anaphylaxis; Angioedema)  sulfa drugs (Unknown)  Tequin (Unknown)    Vital Signs Last 24 Hrs  T(C): 37.1 (31 Jan 2022 11:14), Max: 37.2 (31 Jan 2022 04:44)  T(F): 98.7 (31 Jan 2022 11:14), Max: 98.9 (31 Jan 2022 04:44)  HR: 101 (31 Jan 2022 11:14) (101 - 108)  BP: 170/92 (31 Jan 2022 11:14) (104/68 - 170/92)  BP(mean): --  RR: 19 (31 Jan 2022 11:14) (18 - 19)  SpO2: 96% (31 Jan 2022 11:14) (94% - 97%)    PHYSICAL EXAM:  General: No apparent distress  Neck: Supple, trachea midline, no thyromegaly  Respiratory: Lungs clear bilaterally, normal rate, effort  Cardiac: +S1, S2, no m/r/g  GI: +BS, soft, non tender, non distended  Extremities: LLE edema  Neuro: A+O X3, no tremor      LABS:                        11.7   6.72  )-----------( 231      ( 31 Jan 2022 06:54 )             36.1     01-31    143  |  109<H>  |  10.5  ----------------------------<  89  3.9   |  24.0  |  0.52    Ca    8.5<L>      31 Jan 2022 06:54  Mg     1.8     01-30      Triiodothyronine, Total (T3 Total): 125 ng/dL (01-27-22 @ 03:02)  Thyroid Stimulating Hormone, Serum: 1.81 uIU/mL (01-27-22 @ 03:02)  
[facility] DIVISION OF KIDNEY DISEASES AND HYPERTENSION -- FOLLOW UP NOTE  --------------------------------------------------------------------------------  Chief Complaint:  MILDRED  24 hour events/subjective:    SCr improving;    PAST HISTORY  --------------------------------------------------------------------------------  No significant changes to PMH, PSH, FHx, SHx, unless otherwise noted    ALLERGIES & MEDICATIONS  --------------------------------------------------------------------------------  Allergies    iodinated radiocontrast agents (Anaphylaxis; Angioedema)  sulfa drugs (Unknown)  Tequin (Unknown)    Intolerances      Standing Inpatient Medications  atorvastatin 10 milliGRAM(s) Oral at bedtime  cefTRIAXone   IVPB      cefTRIAXone   IVPB 1000 milliGRAM(s) IV Intermittent every 24 hours  heparin   Injectable 5000 Unit(s) SubCutaneous every 8 hours  hydrocortisone sodium succinate Injectable 50 milliGRAM(s) IV Push every 8 hours  lactated ringers. 1000 milliLiter(s) IV Continuous <Continuous>  lactulose Syrup 20 Gram(s) Oral two times a day  levothyroxine 88 MICROGram(s) Oral daily  lidocaine   4% Patch 1 Patch Transdermal daily  midodrine. 5 milliGRAM(s) Oral three times a day  polyethylene glycol 3350 17 Gram(s) Oral daily  senna 2 Tablet(s) Oral at bedtime  sodium bicarbonate  Infusion 0.109 mEq/kG/Hr IV Continuous <Continuous>    PRN Inpatient Medications  acetaminophen     Tablet .. 650 milliGRAM(s) Oral every 6 hours PRN  ondansetron Injectable 4 milliGRAM(s) IV Push every 6 hours PRN  oxyCODONE    IR 5 milliGRAM(s) Oral every 6 hours PRN      REVIEW OF SYSTEMS  --------------------------------------------------------------------------------  Gen: No weight changes, fatigue, fevers/chills, weakness  Skin: No rashes  Head/Eyes/Ears/Mouth: No headache; Normal hearing; Normal vision w/o blurriness; No sinus pain/discomfort, sore throat  Respiratory: No dyspnea, cough, wheezing, hemoptysis  CV: No chest pain, PND, orthopnea  GI: No abdominal pain, diarrhea, constipation, nausea, vomiting, melena, hematochezia  : No increased frequency, dysuria, hematuria, nocturia  MSK: No joint pain/swelling; no back pain; no edema  Neuro: No dizziness/lightheadedness, weakness, seizures, numbness, tingling  Heme: No easy bruising or bleeding  Endo: No heat/cold intolerance  Psych: No significant nervousness, anxiety, stress, depression    All other systems were reviewed and are negative, except as noted.    VITALS/PHYSICAL EXAM  --------------------------------------------------------------------------------  T(C): 37.1 (01-28-22 @ 11:49), Max: 37.3 (01-27-22 @ 15:26)  HR: 96 (01-28-22 @ 11:49) (88 - 112)  BP: 108/68 (01-28-22 @ 11:49) (97/50 - 133/67)  RR: 18 (01-28-22 @ 11:49) (18 - 20)  SpO2: 98% (01-28-22 @ 11:49) (93% - 98%)  Wt(kg): --        Physical Exam:  Constitutional: NAD, central obesity  HEENT: EOMI, no exophalmos  Neck: trachea midline, no thyroid enlargement  Respiratory: CTAB, normal respirations  Cardiovascular: S1 and S2, RRR  Gastrointestinal: BS+, soft, ntnd  Extremities: No peripheral edema  Neurological: AOx3, no focal deficits  Psychiatric: Normal mood and normal affect  Skin: no rashes, no acanthosis    LABS/STUDIES  --------------------------------------------------------------------------------              12.8   8.60  >-----------<  276      [01-28-22 @ 04:17]              39.3     137  |  102  |  17.8  ----------------------------<  146      [01-28-22 @ 04:14]  3.5   |  18.0  |  1.95        Ca     8.6     [01-28-22 @ 04:14]      Mg     2.7     [01-28-22 @ 04:14]    TPro  6.3  /  Alb  3.4  /  TBili  0.3  /  DBili  x   /  AST  20  /  ALT  23  /  AlkPhos  113  [01-28-22 @ 04:14]    PT/INR: PT 12.5 , INR 1.08       [01-26-22 @ 14:11]  PTT: 30.2       [01-26-22 @ 14:11]      Creatinine Trend:  SCr 1.95 [01-28 @ 04:14]  SCr 2.25 [01-27 @ 03:02]  SCr 2.37 [01-26 @ 14:11]  SCr 0.60 [01-10 @ 17:32]  SCr 0.57 [01-04 @ 08:31]    Urinalysis - [01-27-22 @ 13:36]      Color Yellow / Appearance very cloudy / SG 1.025 / pH 6.0      Gluc Negative / Ketone Small  / Bili Negative / Urobili Negative       Blood Large / Protein 100 / Leuk Est Moderate / Nitrite Negative      RBC 0-2 / WBC >50 / Hyaline  / Gran  / Sq Epi  / Non Sq Epi Occasional / Bacteria Occasional    Urine Creatinine 251      [01-27-22 @ 13:36]  Urine Protein 38.0      [01-27-22 @ 13:36]  Urine Sodium <30      [01-27-22 @ 13:36]  Urine Potassium 28      [01-27-22 @ 13:36]  Urine Osmolality 310      [01-27-22 @ 13:36]    TSH 1.81      [01-27-22 @ 03:02]    HCV 0.16, Nonreact      [01-27-22 @ 09:40]    
Patient is a 71y old  Female who presents with a chief complaint of MILDRED/ Hypotension, constipation. (31 Jan 2022 13:38)  Pt seen and exam. No abd pain,legs pain. Positive bm. Tolearting po diet.    REVIEW OF SYSTEMS: All systems are reviewed and found to be negative except above    MEDICATIONS  (STANDING):  atorvastatin 10 milliGRAM(s) Oral at bedtime  cefTRIAXone   IVPB      cefTRIAXone   IVPB 1000 milliGRAM(s) IV Intermittent every 24 hours  heparin   Injectable 5000 Unit(s) SubCutaneous every 8 hours  hydrocortisone 20 milliGRAM(s) Oral <User Schedule>  hydrocortisone 15 milliGRAM(s) Oral <User Schedule>  lactulose Syrup 20 Gram(s) Oral two times a day  levothyroxine 88 MICROGram(s) Oral daily  lidocaine   4% Patch 1 Patch Transdermal daily  melatonin 3 milliGRAM(s) Oral at bedtime  metoprolol tartrate 25 milliGRAM(s) Oral two times a day  polyethylene glycol 3350 17 Gram(s) Oral daily  senna 2 Tablet(s) Oral at bedtime    MEDICATIONS  (PRN):  acetaminophen     Tablet .. 650 milliGRAM(s) Oral every 6 hours PRN Temp greater or equal to 38C (100.4F), Mild Pain (1 - 3), Moderate Pain (4 - 6)  ondansetron Injectable 4 milliGRAM(s) IV Push every 6 hours PRN Nausea and/or Vomiting  oxyCODONE    IR 5 milliGRAM(s) Oral every 6 hours PRN Moderate Pain (4 - 6)      CAPILLARY BLOOD GLUCOSE        I&O's Summary    31 Jan 2022 07:01  -  01 Feb 2022 07:00  --------------------------------------------------------  IN: 400 mL / OUT: 750 mL / NET: -350 mL        PHYSICAL EXAM:  Vital Signs Last 24 Hrs  T(C): 36.8 (01 Feb 2022 09:58), Max: 37.1 (31 Jan 2022 11:14)  T(F): 98.3 (01 Feb 2022 09:58), Max: 98.7 (31 Jan 2022 11:14)  HR: 67 (01 Feb 2022 09:58) (67 - 101)  BP: 112/68 (01 Feb 2022 09:58) (112/68 - 170/92)  BP(mean): --  RR: 18 (01 Feb 2022 09:58) (18 - 19)  SpO2: 96% (01 Feb 2022 09:58) (96% - 96%)    CONSTITUTIONAL: NAD,  EYES: PERRLA; conjunctiva and sclera clear  ENMT: Moist oral mucosa,   RESPIRATORY: Normal respiratory effort; lungs are clear to auscultation bilaterally  CARDIOVASCULAR: Regular rate and rhythm, normal S1 and S2, no murmur   EXTS: No lower extremity edema; Peripheral pulses are 2+ bilaterally  ABDOMEN: Nontender to palpation, normoactive bowel sounds, no rebound/guarding;   MUSCLOSKELETAL:   no clubbing or cyanosis of digits; no joint swelling or tenderness to palpation  PSYCH: affect appropriate  NEUROLOGY: A+O to person, place, and time; CN 2-12 are intact and symmetric; no gross sensory/MOTOR deficits;       LABS:                        12.2   6.83  )-----------( 236      ( 01 Feb 2022 07:13 )             37.5     02-01    142  |  106  |  8.8  ----------------------------<  87  3.8   |  25.0  |  0.45<L>    Ca    8.5<L>      01 Feb 2022 07:13  Mg     1.8     01-30                  RADIOLOGY & ADDITIONAL TESTS:  Results Reviewed:     
SONAM JENSEN    [id number]    71y      Female    INTERVAL HPI/OVERNIGHT EVENTS: patient being seen for hypotension and georgi. Patient seen at bedside and states feeling better and is passing BMs.     REVIEW OF SYSTEMS:    CONSTITUTIONAL: No fever, weight loss, or fatigue  RESPIRATORY: No cough, wheezing, hemoptysis; No shortness of breath  CARDIOVASCULAR: No chest pain, palpitations  GASTROINTESTINAL: No abdominal or epigastric pain. No nausea, vomiting  NEUROLOGICAL: No headaches, memory loss, loss of strength.  MISCELLANEOUS:      Vital Signs Last 24 Hrs  T(C): 36.9 (2022 07:50), Max: 37.3 (2022 15:26)  T(F): 98.5 (2022 07:50), Max: 99.2 (2022 19:24)  HR: 106 (2022 07:50) (100 - 112)  BP: 133/67 (2022 07:50) (93/57 - 133/67)  BP(mean): --  RR: 20 (2022 07:50) (20 - 20)  SpO2: 98% (2022 07:50) (93% - 98%)    PHYSICAL EXAM:    General: pt. lying in bed not in distress.   HEENT: AT, NC. PERRL. intact EOM. oral mucosa somewhat dry,  Neck: supple. no JVD.   Chest: CTA bilaterally  Heart: S1,S2. RRR. no heart murmur. no edema.   Abdomen: soft. obese+ BS. mild tenderness in lower abd. area, no RT, no guarding.  rectal : deferred by pt, had one by surgery team and no stool in rectal vault or rectal blood reported.  Ext: no calf tenderness, distal pulses intact.   Neuro: AAO x3. no focal weakness. no speech disorder, cns ii to xii intact.  Skin: warm and dry, no diaphoresis, no pallor.  lymphatic system : no lymphadenopathy noted.   psych : normal affect, no si/hi.    LABS:                        12.8   8.60  )-----------( 276      ( 2022 04:17 )             39.3         137  |  102  |  17.8  ----------------------------<  146<H>  3.5   |  18.0<L>  |  1.95<H>    Ca    8.6      2022 04:14  Mg     2.7         TPro  6.3<L>  /  Alb  3.4  /  TBili  0.3<L>  /  DBili  x   /  AST  20  /  ALT  23  /  AlkPhos  113      PT/INR - ( 2022 14:11 )   PT: 12.5 sec;   INR: 1.08 ratio         PTT - ( 2022 14:11 )  PTT:30.2 sec  Urinalysis Basic - ( 2022 13:36 )    Color: Yellow / Appearance: very cloudy / S.025 / pH: x  Gluc: x / Ketone: Small  / Bili: Negative / Urobili: Negative mg/dL   Blood: x / Protein: 100 mg/dL / Nitrite: Negative   Leuk Esterase: Moderate / RBC: 0-2 /HPF / WBC >50 /HPF   Sq Epi: x / Non Sq Epi: Occasional / Bacteria: Occasional    MEDICATIONS  (STANDING):  atorvastatin 10 milliGRAM(s) Oral at bedtime  cefTRIAXone   IVPB      cefTRIAXone   IVPB 1000 milliGRAM(s) IV Intermittent every 24 hours  heparin   Injectable 5000 Unit(s) SubCutaneous every 8 hours  hydrocortisone sodium succinate Injectable 50 milliGRAM(s) IV Push every 8 hours  lactated ringers. 1000 milliLiter(s) (110 mL/Hr) IV Continuous <Continuous>  lactulose Syrup 20 Gram(s) Oral two times a day  levothyroxine 88 MICROGram(s) Oral daily  lidocaine   4% Patch 1 Patch Transdermal daily  midodrine. 5 milliGRAM(s) Oral three times a day  polyethylene glycol 3350 17 Gram(s) Oral daily  senna 2 Tablet(s) Oral at bedtime  sodium bicarbonate  Infusion 0.109 mEq/kG/Hr (125 mL/Hr) IV Continuous <Continuous>    MEDICATIONS  (PRN):  acetaminophen     Tablet .. 650 milliGRAM(s) Oral every 6 hours PRN Temp greater or equal to 38C (100.4F), Mild Pain (1 - 3), Moderate Pain (4 - 6)  ondansetron Injectable 4 milliGRAM(s) IV Push every 6 hours PRN Nausea and/or Vomiting  oxyCODONE    IR 5 milliGRAM(s) Oral every 6 hours PRN Moderate Pain (4 - 6)      RADIOLOGY & ADDITIONAL TESTS:  
CC: Follow up AI management     INTERVAL HPI/OVERNIGHT EVENTS:  Pt reports her hands having "the shakes"    MEDICATIONS  (STANDING):  atorvastatin 10 milliGRAM(s) Oral at bedtime  cefTRIAXone   IVPB      cefTRIAXone   IVPB 1000 milliGRAM(s) IV Intermittent every 24 hours  heparin   Injectable 5000 Unit(s) SubCutaneous every 8 hours  hydrocortisone sodium succinate Injectable 50 milliGRAM(s) IV Push every 8 hours  lactated ringers. 1000 milliLiter(s) (110 mL/Hr) IV Continuous <Continuous>  lactulose Syrup 20 Gram(s) Oral two times a day  levothyroxine 88 MICROGram(s) Oral daily  lidocaine   4% Patch 1 Patch Transdermal daily  midodrine. 5 milliGRAM(s) Oral three times a day  polyethylene glycol 3350 17 Gram(s) Oral daily  senna 2 Tablet(s) Oral at bedtime  sodium bicarbonate  Infusion 0.109 mEq/kG/Hr (125 mL/Hr) IV Continuous <Continuous>    MEDICATIONS  (PRN):  acetaminophen     Tablet .. 650 milliGRAM(s) Oral every 6 hours PRN Temp greater or equal to 38C (100.4F), Mild Pain (1 - 3), Moderate Pain (4 - 6)  ondansetron Injectable 4 milliGRAM(s) IV Push every 6 hours PRN Nausea and/or Vomiting  oxyCODONE    IR 5 milliGRAM(s) Oral every 6 hours PRN Moderate Pain (4 - 6)      Allergies  iodinated radiocontrast agents (Anaphylaxis; Angioedema)  sulfa drugs (Unknown)  Tequin (Unknown)    Vital Signs Last 24 Hrs  T(C): 36.9 (2022 07:50), Max: 37.3 (2022 15:26)  T(F): 98.5 (2022 07:50), Max: 99.2 (2022 19:24)  HR: 106 (2022 07:50) (100 - 112)  BP: 133/67 (2022 07:50) (93/57 - 133/67)  BP(mean): --  RR: 20 (2022 07:50) (20 - 20)  SpO2: 98% (2022 07:50) (93% - 98%)    PHYSICAL EXAM:  General: No apparent distress  Neck: Supple, trachea midline, no thyromegaly  Respiratory: Lungs clear bilaterally, normal rate, effort  Cardiac: +S1, S2, no m/r/g  GI: +BS, soft, non tender, non distended  Extremities: No peripheral edema, no pedal lesions  Neuro: A+O X3, no tremor  Pysch: Normal mood, normal affect  Skin: No rashes, acanthosis       LABS:                        12.8   8.60  )-----------( 276      ( 2022 04:17 )             39.3         137  |  102  |  17.8  ----------------------------<  146<H>  3.5   |  18.0<L>  |  1.95<H>    Ca    8.6      2022 04:14  Mg     2.7         TPro  6.3<L>  /  Alb  3.4  /  TBili  0.3<L>  /  DBili  x   /  AST  20  /  ALT  23  /  AlkPhos  113      Urinalysis Basic - ( 2022 13:36 )    Color: Yellow / Appearance: very cloudy / S.025 / pH: x  Gluc: x / Ketone: Small  / Bili: Negative / Urobili: Negative mg/dL   Blood: x / Protein: 100 mg/dL / Nitrite: Negative   Leuk Esterase: Moderate / RBC: 0-2 /HPF / WBC >50 /HPF   Sq Epi: x / Non Sq Epi: Occasional / Bacteria: Occasional      Triiodothyronine, Total (T3 Total): 125 ng/dL (22 @ 03:02)  Thyroid Stimulating Hormone, Serum: 1.81 uIU/mL (22 @ 03:02)  
CC: Follow up AI management     INTERVAL HPI/OVERNIGHT EVENTS:  Waiting d/c to [facility]    ROS: Patient denies chest pain, SOB, abd pain, N/V, or any other complaints. All remainder ROS negative.    MEDICATIONS  (STANDING):  atorvastatin 10 milliGRAM(s) Oral at bedtime  heparin   Injectable 5000 Unit(s) SubCutaneous every 8 hours  hydrocortisone 20 milliGRAM(s) Oral <User Schedule>  hydrocortisone 15 milliGRAM(s) Oral <User Schedule>  lactulose Syrup 20 Gram(s) Oral two times a day  levothyroxine 88 MICROGram(s) Oral daily  lidocaine   4% Patch 1 Patch Transdermal daily  melatonin 3 milliGRAM(s) Oral at bedtime  metoprolol tartrate 25 milliGRAM(s) Oral two times a day  polyethylene glycol 3350 17 Gram(s) Oral daily  senna 2 Tablet(s) Oral at bedtime    MEDICATIONS  (PRN):  acetaminophen     Tablet .. 650 milliGRAM(s) Oral every 6 hours PRN Temp greater or equal to 38C (100.4F), Mild Pain (1 - 3), Moderate Pain (4 - 6)  ondansetron Injectable 4 milliGRAM(s) IV Push every 6 hours PRN Nausea and/or Vomiting  oxyCODONE    IR 5 milliGRAM(s) Oral every 6 hours PRN Moderate Pain (4 - 6)    Allergies  iodinated radiocontrast agents (Anaphylaxis; Angioedema)  sulfa drugs (Unknown)  Tequin (Unknown)    Vital Signs Last 24 Hrs  T(C): 37.1 (02 Feb 2022 04:16), Max: 37.1 (02 Feb 2022 04:16)  T(F): 98.8 (02 Feb 2022 04:16), Max: 98.8 (02 Feb 2022 04:16)  HR: 96 (02 Feb 2022 04:16) (96 - 103)  BP: 155/80 (02 Feb 2022 04:16) (124/78 - 155/80)  BP(mean): --  RR: 18 (02 Feb 2022 04:16) (18 - 18)  SpO2: 97% (02 Feb 2022 04:16) (97% - 98%)    PHYSICAL EXAM:  General: No apparent distress  Neck: Supple, trachea midline, no thyromegaly  Respiratory: Lungs clear bilaterally, normal rate, effort  Cardiac: +S1, S2, no m/r/g  GI: +BS, soft, non tender, non distended  Extremities: No peripheral edema, no pedal lesions  Neuro: A+O X3, no tremor  Pysch: Normal mood, normal affect  Skin: No rashes, acanthosis       LABS:                        12.2   6.83  )-----------( 236      ( 01 Feb 2022 07:13 )             37.5     02-01    142  |  106  |  8.8  ----------------------------<  87  3.8   |  25.0  |  0.45<L>    Ca    8.5<L>      01 Feb 2022 07:13        Triiodothyronine, Total (T3 Total): 125 ng/dL (01-27-22 @ 03:02)  Thyroid Stimulating Hormone, Serum: 1.81 uIU/mL (01-27-22 @ 03:02)  
HPI/OVERNIGHT EVENTS:  Overnight, patient BP improved (systolic in 100s), however she is tachy to 110s. Patient denies any chest pain, palpitations. She endorses anxiety, in addition to L leg pain. Patient is tolerating a CLD. She denies any abdominal pain, N&V. Patient is on bowel regimen , denies BM at this time.     MEDICATIONS  (STANDING):  atorvastatin 10 milliGRAM(s) Oral at bedtime  cefTRIAXone   IVPB      cefTRIAXone   IVPB 1000 milliGRAM(s) IV Intermittent every 24 hours  heparin   Injectable 5000 Unit(s) SubCutaneous every 8 hours  hydrocortisone sodium succinate Injectable 50 milliGRAM(s) IV Push every 8 hours  lactated ringers. 1000 milliLiter(s) (110 mL/Hr) IV Continuous <Continuous>  lactulose Syrup 20 Gram(s) Oral two times a day  levothyroxine 88 MICROGram(s) Oral daily  lidocaine   4% Patch 1 Patch Transdermal daily  magnesium sulfate  IVPB 2 Gram(s) IV Intermittent every 2 hours  midodrine. 5 milliGRAM(s) Oral three times a day  polyethylene glycol 3350 17 Gram(s) Oral daily  senna 2 Tablet(s) Oral at bedtime  sodium bicarbonate  Infusion 0.109 mEq/kG/Hr (125 mL/Hr) IV Continuous <Continuous>    MEDICATIONS  (PRN):  acetaminophen     Tablet .. 650 milliGRAM(s) Oral every 6 hours PRN Temp greater or equal to 38C (100.4F), Mild Pain (1 - 3), Moderate Pain (4 - 6)  ondansetron Injectable 4 milliGRAM(s) IV Push every 6 hours PRN Nausea and/or Vomiting  oxyCODONE    IR 5 milliGRAM(s) Oral every 6 hours PRN Moderate Pain (4 - 6)      Vital Signs Last 24 Hrs  T(C): 37.3 (2022 19:24), Max: 37.3 (2022 04:20)  T(F): 99.2 (2022 19:24), Max: 99.2 (2022 19:24)  HR: 112 (2022 19:24) (89 - 112)  BP: 110/74 (2022 19:24) (93/57 - 110/74)  BP(mean): --  RR: 20 (2022 19:24) (20 - 20)  SpO2: 98% (2022 19:24) (95% - 98%)    Constitutional: patient resting comfortably in bed, in no acute distress  Respiratory: respirations are unlabored, no accessory muscle use, no conversational dyspnea  Cardiovascular: tachy   Gastrointestinal: Abdomen soft, non-tender, mildly distended, no rebound tenderness / guarding, previous incisions healed   Neurological:  A&O x 3; no gross sensory / motor / coordination deficits  MSK : Bilateral lower ext edema, pain on L leg near medial pop region       I&O's Detail      LABS:                        13.0   9.09  )-----------( 274      ( 2022 03:02 )             40.8         139  |  105  |  14.6  ----------------------------<  128<H>  3.4<L>   |  17.0<L>  |  2.25<H>    Ca    8.1<L>      2022 03:02  Mg     1.4         TPro  5.5<L>  /  Alb  3.0<L>  /  TBili  0.4  /  DBili  x   /  AST  31  /  ALT  27  /  AlkPhos  103      PT/INR - ( 2022 14:11 )   PT: 12.5 sec;   INR: 1.08 ratio         PTT - ( 2022 14:11 )  PTT:30.2 sec  Urinalysis Basic - ( 2022 13:36 )    Color: Yellow / Appearance: very cloudy / S.025 / pH: x  Gluc: x / Ketone: Small  / Bili: Negative / Urobili: Negative mg/dL   Blood: x / Protein: 100 mg/dL / Nitrite: Negative   Leuk Esterase: Moderate / RBC: 0-2 /HPF / WBC >50 /HPF   Sq Epi: x / Non Sq Epi: Occasional / Bacteria: Occasional      - No acute surgical intervention at this time  - Surgery will continue to follow  
Patient is a 71y old  Female who presents with a chief complaint of MILDRED/ Hypotension, constipation. (29 Jan 2022 13:19)    Pt denies any abd pain, n/v. had mild heart burn last night, not present today. no fever,chill,cough,dysuria/diarrhea  REVIEW OF SYSTEMS: All systems are reviewed and found to be negative except above    MEDICATIONS  (STANDING):  atorvastatin 10 milliGRAM(s) Oral at bedtime  cefTRIAXone   IVPB      cefTRIAXone   IVPB 1000 milliGRAM(s) IV Intermittent every 24 hours  heparin   Injectable 5000 Unit(s) SubCutaneous every 8 hours  hydrocortisone 20 milliGRAM(s) Oral <User Schedule>  hydrocortisone 15 milliGRAM(s) Oral <User Schedule>  lactated ringers. 1000 milliLiter(s) (80 mL/Hr) IV Continuous <Continuous>  lactulose Syrup 20 Gram(s) Oral two times a day  levothyroxine 88 MICROGram(s) Oral daily  lidocaine   4% Patch 1 Patch Transdermal daily  polyethylene glycol 3350 17 Gram(s) Oral daily  senna 2 Tablet(s) Oral at bedtime    MEDICATIONS  (PRN):  acetaminophen     Tablet .. 650 milliGRAM(s) Oral every 6 hours PRN Temp greater or equal to 38C (100.4F), Mild Pain (1 - 3), Moderate Pain (4 - 6)  ondansetron Injectable 4 milliGRAM(s) IV Push every 6 hours PRN Nausea and/or Vomiting  oxyCODONE    IR 5 milliGRAM(s) Oral every 6 hours PRN Moderate Pain (4 - 6)      CAPILLARY BLOOD GLUCOSE        I&O's Summary    29 Jan 2022 07:01  -  30 Jan 2022 07:00  --------------------------------------------------------  IN: 0 mL / OUT: 500 mL / NET: -500 mL    30 Jan 2022 07:01  -  30 Jan 2022 12:00  --------------------------------------------------------  IN: 320 mL / OUT: 0 mL / NET: 320 mL        PHYSICAL EXAM:  Vital Signs Last 24 Hrs  T(C): 36.9 (30 Jan 2022 04:07), Max: 36.9 (29 Jan 2022 18:25)  T(F): 98.5 (30 Jan 2022 04:07), Max: 98.5 (30 Jan 2022 04:07)  HR: 97 (30 Jan 2022 04:07) (97 - 105)  BP: 129/71 (30 Jan 2022 04:07) (129/71 - 145/77)  BP(mean): --  RR: 18 (29 Jan 2022 18:25) (18 - 18)  SpO2: 98% (29 Jan 2022 18:25) (96% - 98%)    CONSTITUTIONAL: NAD,  EYES: PERRLA; conjunctiva and sclera clear  ENMT: Moist oral mucosa,   RESPIRATORY: Normal respiratory effort; lungs are clear to auscultation bilaterally  CARDIOVASCULAR: Regular rate and rhythm, normal S1 and S2, no murmur   EXTS: mild lower extremity edema; Peripheral pulses are 2+ bilaterally.  no legs tenderness /knees tenderness  ABDOMEN: Nontender to palpation, normoactive bowel sounds, no rebound/guarding;   MUSCLOSKELETAL:   no r cyanosis of digits; no joint swelling or tenderness to palpation  PSYCH: affect appropriate  NEUROLOGY: A+O to person, place, and time;  no gross sensory/motor deficits;       LABS:                        11.6   8.60  )-----------( 255      ( 30 Jan 2022 10:56 )             35.8     01-30    144  |  109<H>  |  12.8  ----------------------------<  102<H>  4.1   |  21.0<L>  |  0.64    Ca    8.2<L>      30 Jan 2022 10:56  Mg     1.8     01-30    TPro  5.8<L>  /  Alb  3.2<L>  /  TBili  0.4  /  DBili  x   /  AST  16  /  ALT  17  /  AlkPhos  101  01-29                RADIOLOGY & ADDITIONAL TESTS:  Results Reviewed:     
Patient is a 71y old  Female who presents with a chief complaint of MILDRED/ Hypotension, constipation. (31 Jan 2022 11:37)      pt denies any abd pain,n/v/legs pain. + large bm am.    REVIEW OF SYSTEMS: All systems are reviewed and found to be negative except above    MEDICATIONS  (STANDING):  atorvastatin 10 milliGRAM(s) Oral at bedtime  cefTRIAXone   IVPB      cefTRIAXone   IVPB 1000 milliGRAM(s) IV Intermittent every 24 hours  heparin   Injectable 5000 Unit(s) SubCutaneous every 8 hours  hydrocortisone 20 milliGRAM(s) Oral <User Schedule>  hydrocortisone 15 milliGRAM(s) Oral <User Schedule>  lactulose Syrup 20 Gram(s) Oral two times a day  levothyroxine 88 MICROGram(s) Oral daily  lidocaine   4% Patch 1 Patch Transdermal daily  melatonin 3 milliGRAM(s) Oral at bedtime  polyethylene glycol 3350 17 Gram(s) Oral daily  senna 2 Tablet(s) Oral at bedtime    MEDICATIONS  (PRN):  acetaminophen     Tablet .. 650 milliGRAM(s) Oral every 6 hours PRN Temp greater or equal to 38C (100.4F), Mild Pain (1 - 3), Moderate Pain (4 - 6)  ondansetron Injectable 4 milliGRAM(s) IV Push every 6 hours PRN Nausea and/or Vomiting  oxyCODONE    IR 5 milliGRAM(s) Oral every 6 hours PRN Moderate Pain (4 - 6)      CAPILLARY BLOOD GLUCOSE        I&O's Summary    30 Jan 2022 07:01  -  31 Jan 2022 07:00  --------------------------------------------------------  IN: 1280 mL / OUT: 0 mL / NET: 1280 mL        PHYSICAL EXAM:  Vital Signs Last 24 Hrs  T(C): 37.1 (31 Jan 2022 11:14), Max: 37.2 (31 Jan 2022 04:44)  T(F): 98.7 (31 Jan 2022 11:14), Max: 98.9 (31 Jan 2022 04:44)  HR: 101 (31 Jan 2022 11:14) (101 - 108)  BP: 170/92 (31 Jan 2022 11:14) (147/79 - 170/92)  BP(mean): --  RR: 19 (31 Jan 2022 11:14) (18 - 19)  SpO2: 96% (31 Jan 2022 11:14) (96% - 97%)    CONSTITUTIONAL: NAD,  EYES: PERRLA; conjunctiva and sclera clear  ENMT: Moist oral mucosa,   RESPIRATORY: Normal respiratory effort; lungs are clear to auscultation bilaterally  CARDIOVASCULAR: Regular rate and rhythm, normal S1 and S2, no murmur   EXTS: trace lower extremity edema; Peripheral pulses are 2+ bilaterally  ABDOMEN: Nontender to palpation, normoactive bowel sounds, no rebound/guarding;   MUSCLOSKELETAL:   no c cyanosis of digits; no joint swelling or tenderness to palpation  PSYCH: affect appropriate  NEUROLOGY: A+O to person, place, and time; CN 2-12 are intact and symmetric; no gross sensory/motor deficits;       LABS:                        11.7   6.72  )-----------( 231      ( 31 Jan 2022 06:54 )             36.1     01-31    143  |  109<H>  |  10.5  ----------------------------<  89  3.9   |  24.0  |  0.52    Ca    8.5<L>      31 Jan 2022 06:54  Mg     1.8     01-30                  RADIOLOGY & ADDITIONAL TESTS:  Results Reviewed:     
CC: Follow up AI management     INTERVAL HPI/OVERNIGHT EVENTS:  Resting in bed    ROS: Patient denies chest pain, SOB, abd pain, N/V, or any other complaints. All remainder ROS negative.    MEDICATIONS  (STANDING):  atorvastatin 10 milliGRAM(s) Oral at bedtime  heparin   Injectable 5000 Unit(s) SubCutaneous every 8 hours  hydrocortisone 15 milliGRAM(s) Oral <User Schedule>  hydrocortisone 20 milliGRAM(s) Oral <User Schedule>  lactulose Syrup 20 Gram(s) Oral two times a day  levothyroxine 88 MICROGram(s) Oral daily  lidocaine   4% Patch 1 Patch Transdermal daily  melatonin 3 milliGRAM(s) Oral at bedtime  metoprolol tartrate 25 milliGRAM(s) Oral two times a day  polyethylene glycol 3350 17 Gram(s) Oral daily  senna 2 Tablet(s) Oral at bedtime    MEDICATIONS  (PRN):  acetaminophen     Tablet .. 650 milliGRAM(s) Oral every 6 hours PRN Temp greater or equal to 38C (100.4F), Mild Pain (1 - 3), Moderate Pain (4 - 6)  ondansetron Injectable 4 milliGRAM(s) IV Push every 6 hours PRN Nausea and/or Vomiting  oxyCODONE    IR 5 milliGRAM(s) Oral every 6 hours PRN Moderate Pain (4 - 6)    Allergies  iodinated radiocontrast agents (Anaphylaxis; Angioedema)  sulfa drugs (Unknown)  Tequin (Unknown)    Vital Signs Last 24 Hrs  T(C): 36.8 (01 Feb 2022 09:58), Max: 37.1 (01 Feb 2022 04:46)  T(F): 98.3 (01 Feb 2022 09:58), Max: 98.7 (01 Feb 2022 04:46)  HR: 67 (01 Feb 2022 09:58) (67 - 98)  BP: 112/68 (01 Feb 2022 09:58) (112/68 - 168/87)  BP(mean): --  RR: 18 (01 Feb 2022 09:58) (18 - 18)  SpO2: 96% (01 Feb 2022 09:58) (96% - 96%)    PHYSICAL EXAM:  General: No apparent distress  Neck: Supple, trachea midline, no thyromegaly  Respiratory: Lungs clear bilaterally, normal rate, effort  Cardiac: +S1, S2, no m/r/g  GI: +BS, soft, non tender, non distended  Extremities: No peripheral edema, no pedal lesions  Neuro: A+O X3, no tremor  Pysch: Normal mood, normal affect  Skin: No rashes, acanthosis       LABS:                        12.2   6.83  )-----------( 236      ( 01 Feb 2022 07:13 )             37.5     02-01    142  |  106  |  8.8  ----------------------------<  87  3.8   |  25.0  |  0.45<L>    Ca    8.5<L>      01 Feb 2022 07:13      Triiodothyronine, Total (T3 Total): 125 ng/dL (01-27-22 @ 03:02)  Thyroid Stimulating Hormone, Serum: 1.81 uIU/mL (01-27-22 @ 03:02)  
Interval Events:  no overnight events  follow up on AI    patient seen and examined at bedside.  doing better  has an appetite now, eating a bit  complains of some back pain  wants to try to move around a bit    REVIEW OF SYSTEMS:    CONSTITUTIONAL: No fever, weight loss, or fatigue  EYES: No eye pain, visual disturbances, or discharge  ENMT:  No difficulty hearing, tinnitus, vertigo; No sinus or throat pain  NECK: No pain or stiffness  RESPIRATORY: No cough, wheezing, chills or hemoptysis; No shortness of breath  CARDIOVASCULAR: No chest pain, palpitations, dizziness, or leg swelling  GASTROINTESTINAL: No abdominal or epigastric pain. No nausea, vomiting, or hematemesis; No diarrhea or constipation. No melena or hematochezia.  NEUROLOGICAL: No headaches, memory loss, loss of strength, numbness, or tremors  SKIN: No itching, burning, rashes, or lesions   MUSCULOSKELETAL: No joint pain or swelling; No muscle, back, or extremity pain  PSYCHIATRIC: No depression, anxiety, mood swings, or difficulty sleeping        iodinated radiocontrast agents (Anaphylaxis; Angioedema)  sulfa drugs (Unknown)  Tequin (Unknown)      MEDICATIONS  (STANDING):  atorvastatin 10 milliGRAM(s) Oral at bedtime  cefTRIAXone   IVPB 1000 milliGRAM(s) IV Intermittent every 24 hours  cefTRIAXone   IVPB      heparin   Injectable 5000 Unit(s) SubCutaneous every 8 hours  hydrocortisone 20 milliGRAM(s) Oral <User Schedule>  hydrocortisone 15 milliGRAM(s) Oral <User Schedule>  lactated ringers. 1000 milliLiter(s) (80 mL/Hr) IV Continuous <Continuous>  lactulose Syrup 20 Gram(s) Oral two times a day  levothyroxine 88 MICROGram(s) Oral daily  lidocaine   4% Patch 1 Patch Transdermal daily  polyethylene glycol 3350 17 Gram(s) Oral daily  senna 2 Tablet(s) Oral at bedtime    MEDICATIONS  (PRN):  acetaminophen     Tablet .. 650 milliGRAM(s) Oral every 6 hours PRN Temp greater or equal to 38C (100.4F), Mild Pain (1 - 3), Moderate Pain (4 - 6)  ondansetron Injectable 4 milliGRAM(s) IV Push every 6 hours PRN Nausea and/or Vomiting  oxyCODONE    IR 5 milliGRAM(s) Oral every 6 hours PRN Moderate Pain (4 - 6)      Vital Signs Last 24 Hrs  T(C): 36.7 (30 Jan 2022 11:46), Max: 36.9 (29 Jan 2022 18:25)  T(F): 98.1 (30 Jan 2022 11:46), Max: 98.5 (30 Jan 2022 04:07)  HR: 101 (30 Jan 2022 11:46) (97 - 101)  BP: 104/68 (30 Jan 2022 11:46) (104/68 - 134/70)  BP(mean): --  RR: 18 (30 Jan 2022 11:46) (18 - 18)  SpO2: 94% (30 Jan 2022 11:46) (94% - 98%)  Weight (kg): 86.2 (01-26-22 @ 13:30)    Physical Exam:    Constitutional: NAD, well-developed  HEENT: EOMI, no exophalmos  Neck: trachea midline, no thyroid enlargement  Respiratory: CTAB, normal respirations  Cardiovascular: S1 and S2, RRR  Gastrointestinal: BS+, soft, ntnd  Extremities: No peripheral edema  Neurological: AOx3, no focal deficits  Psychiatric: Normal mood and normal affect  Skin: no rashes, no acanthosis    LABS  01-30    144  |  109<H>  |  12.8  ----------------------------<  102<H>  4.1   |  21.0<L>  |  0.64    Ca    8.2<L>      30 Jan 2022 10:56  Mg     1.8     01-30    TPro  5.8<L>  /  Alb  3.2<L>  /  TBili  0.4  /  DBili  x   /  AST  16  /  ALT  17  /  AlkPhos  101  01-29                          11.6   8.60  )-----------( 255      ( 30 Jan 2022 10:56 )             35.8       T4, Serum: 7.0 ug/dL (01-27-22 @ 03:02)  Thyroid Stimulating Hormone, Serum: 1.81 uIU/mL (01-27-22 @ 03:02)    Aspartate Aminotransferase (AST/SGOT): 16 U/L (01-29-22 @ 09:36)  Alkaline Phosphatase, Serum: 101 U/L (01-29-22 @ 09:36)  Alanine Aminotransferase (ALT/SGPT): 17 U/L (01-29-22 @ 09:36)  Albumin, Serum: 3.2 g/dL (01-29-22 @ 09:36)          CAPILLARY BLOOD GLUCOSE      
INTERVAL HPI/OVERNIGHT EVENTS:    Patient evaluated at bedside. No acute distress. No acute events overnight.  Been hypotensive, patient given IV hydrocortisone and fluids, mild improvement in BP  Lactic acid trended down after receiving fluids        Vital Signs Last 24 Hrs  T(C): 36.8 (26 Jan 2022 20:31), Max: 36.8 (26 Jan 2022 20:31)  T(F): 98.3 (26 Jan 2022 20:31), Max: 98.3 (26 Jan 2022 20:31)  HR: 95 (26 Jan 2022 20:31) (91 - 103)  BP: 100/70 (26 Jan 2022 20:31) (87/57 - 118/61)  BP(mean): --  RR: 20 (26 Jan 2022 20:31) (18 - 22)  SpO2: 99% (26 Jan 2022 20:31) (96% - 99%)      PHYSICAL EXAM:   General: NAD, Lying in bed comfortably  Neuro: A+Ox3  HEENT: EOMI, PERRLA, MMM  Cardio: RRR  Resp: Non labored breathing on RA  GI/Abd: Soft, ND, no rebound/guarding, no masses palpated. Mildly tender to palpation in LLQ.   Rectal exam: No stool burned palpated. No blood on glove.   Vascular: All 4 extremities warm and well perfused.   Pelvis: stable  Musculoskeletal: All 4 extremities moving spontaneously, no limitations, no spinal tenderness.    I&O's Detail      LABS:                        13.8   8.14  )-----------( 345      ( 26 Jan 2022 14:11 )             42.1     01-26    138  |  104  |  14.7  ----------------------------<  104<H>  3.5   |  19.0<L>  |  2.37<H>    Ca    8.6      26 Jan 2022 14:11    TPro  6.0<L>  /  Alb  3.2<L>  /  TBili  0.5  /  DBili  x   /  AST  43<H>  /  ALT  33<H>  /  AlkPhos  117  01-26    PT/INR - ( 26 Jan 2022 14:11 )   PT: 12.5 sec;   INR: 1.08 ratio         PTT - ( 26 Jan 2022 14:11 )  PTT:30.2 sec      RADIOLOGY & ADDITIONAL STUDIES:
INTERVAL HPI/OVERNIGHT EVENTS:  BP improving, now slightly hypertensive  on midodrine as well    MEDICATIONS  (STANDING):  atorvastatin 10 milliGRAM(s) Oral at bedtime  cefTRIAXone   IVPB      cefTRIAXone   IVPB 1000 milliGRAM(s) IV Intermittent every 24 hours  heparin   Injectable 5000 Unit(s) SubCutaneous every 8 hours  hydrocortisone 20 milliGRAM(s) Oral <User Schedule>  hydrocortisone 20 milliGRAM(s) Oral <User Schedule>  lactated ringers. 1000 milliLiter(s) (80 mL/Hr) IV Continuous <Continuous>  lactulose Syrup 20 Gram(s) Oral two times a day  levothyroxine 88 MICROGram(s) Oral daily  lidocaine   4% Patch 1 Patch Transdermal daily  polyethylene glycol 3350 17 Gram(s) Oral daily  potassium chloride    Tablet ER 40 milliEquivalent(s) Oral every 4 hours  senna 2 Tablet(s) Oral at bedtime    MEDICATIONS  (PRN):  acetaminophen     Tablet .. 650 milliGRAM(s) Oral every 6 hours PRN Temp greater or equal to 38C (100.4F), Mild Pain (1 - 3), Moderate Pain (4 - 6)  ondansetron Injectable 4 milliGRAM(s) IV Push every 6 hours PRN Nausea and/or Vomiting  oxyCODONE    IR 5 milliGRAM(s) Oral every 6 hours PRN Moderate Pain (4 - 6)      Allergies    iodinated radiocontrast agents (Anaphylaxis; Angioedema)  sulfa drugs (Unknown)  Tequin (Unknown)    Intolerances        Vital Signs Last 24 Hrs  T(C): 36.8 (29 Jan 2022 12:34), Max: 37.4 (29 Jan 2022 00:08)  T(F): 98.2 (29 Jan 2022 12:34), Max: 99.3 (29 Jan 2022 00:08)  HR: 105 (29 Jan 2022 12:34) (89 - 113)  BP: 145/77 (29 Jan 2022 12:34) (100/67 - 148/78)  BP(mean): --  RR: 18 (29 Jan 2022 12:34) (18 - 18)  SpO2: 96% (29 Jan 2022 12:34) (94% - 98%)      LABS:                        12.8   8.60  )-----------( 276      ( 28 Jan 2022 04:17 )             39.3     01-29    142  |  106  |  15.4  ----------------------------<  136<H>  3.0<L>   |  22.0  |  0.77    Ca    8.3<L>      29 Jan 2022 09:36  Mg     2.2     01-29    TPro  5.8<L>  /  Alb  3.2<L>  /  TBili  0.4  /  DBili  x   /  AST  16  /  ALT  17  /  AlkPhos  101  01-29          Triiodothyronine, Total (T3 Total): 125 ng/dL (01-27-22 @ 03:02)  T4, Serum: 7.0 ug/dL (01-27-22 @ 03:02)  Thyroid Stimulating Hormone, Serum: 1.81 uIU/mL (01-27-22 @ 03:02)      CAPILLARY BLOOD GLUCOSE          COVID-19 PCR: NotDetec (26 Jan 2022 14:12)      RADIOLOGY & ADDITIONAL TESTS:  none
Patient is a 71y old  Female who presents with a chief complaint of MILDRED/ Hypotension, constipation. (01 Feb 2022 11:33)      No acute issues. pain stable,tolerating diet. Large big bm yesterday  REVIEW OF SYSTEMS: All systems are reviewed and found to be negative except above    MEDICATIONS  (STANDING):  atorvastatin 10 milliGRAM(s) Oral at bedtime  heparin   Injectable 5000 Unit(s) SubCutaneous every 8 hours  hydrocortisone 20 milliGRAM(s) Oral <User Schedule>  hydrocortisone 15 milliGRAM(s) Oral <User Schedule>  lactulose Syrup 20 Gram(s) Oral two times a day  levothyroxine 88 MICROGram(s) Oral daily  lidocaine   4% Patch 1 Patch Transdermal daily  melatonin 3 milliGRAM(s) Oral at bedtime  metoprolol tartrate 25 milliGRAM(s) Oral two times a day  polyethylene glycol 3350 17 Gram(s) Oral daily  senna 2 Tablet(s) Oral at bedtime    MEDICATIONS  (PRN):  acetaminophen     Tablet .. 650 milliGRAM(s) Oral every 6 hours PRN Temp greater or equal to 38C (100.4F), Mild Pain (1 - 3), Moderate Pain (4 - 6)  ondansetron Injectable 4 milliGRAM(s) IV Push every 6 hours PRN Nausea and/or Vomiting  oxyCODONE    IR 5 milliGRAM(s) Oral every 6 hours PRN Moderate Pain (4 - 6)      CAPILLARY BLOOD GLUCOSE        I&O's Summary    01 Feb 2022 07:01  -  02 Feb 2022 07:00  --------------------------------------------------------  IN: 0 mL / OUT: 600 mL / NET: -600 mL        PHYSICAL EXAM:  Vital Signs Last 24 Hrs  T(C): 37.1 (02 Feb 2022 04:16), Max: 37.1 (02 Feb 2022 04:16)  T(F): 98.8 (02 Feb 2022 04:16), Max: 98.8 (02 Feb 2022 04:16)  HR: 96 (02 Feb 2022 04:16) (96 - 103)  BP: 155/80 (02 Feb 2022 04:16) (124/78 - 155/80)  BP(mean): --  RR: 18 (02 Feb 2022 04:16) (18 - 18)  SpO2: 97% (02 Feb 2022 04:16) (97% - 98%)    CONSTITUTIONAL: NAD,  EYES: PERRLA; conjunctiva and sclera clear  ENMT: Moist oral mucosa,   RESPIRATORY: Normal respiratory effort; lungs are clear to auscultation bilaterally  CARDIOVASCULAR: Regular rate and rhythm, normal S1 and S2, no murmur   EXTS: No lower extremity edema; Peripheral pulses are 2+ bilaterally  ABDOMEN: Nontender to palpation, normoactive bowel sounds, no rebound/guarding;   MUSCLOSKELETAL:   no  cyanosis of digits; no joint swelling or tenderness to palpation  PSYCH: affect appropriate  NEUROLOGY: A+O to person, place, and time; CN 2-12 are intact and symmetric; no gross sensory/motor deficits;       LABS:                        12.2   6.83  )-----------( 236      ( 01 Feb 2022 07:13 )             37.5     02-01    142  |  106  |  8.8  ----------------------------<  87  3.8   |  25.0  |  0.45<L>    Ca    8.5<L>      01 Feb 2022 07:13                  RADIOLOGY & ADDITIONAL TESTS:  Results Reviewed:     
Patient is a 71y old  Female who presents with a chief complaint of MILDRED/ Hypotension, constipation. (2022 13:54)  Pt denies cp,abd pain,sob,weakness,numbness. Pt has bm ,passing gas.  c/o lt knee discomfort  REVIEW OF SYSTEMS: All systems are reviewed and found to be negative except above    MEDICATIONS  (STANDING):  atorvastatin 10 milliGRAM(s) Oral at bedtime  cefTRIAXone   IVPB      cefTRIAXone   IVPB 1000 milliGRAM(s) IV Intermittent every 24 hours  heparin   Injectable 5000 Unit(s) SubCutaneous every 8 hours  hydrocortisone sodium succinate Injectable 50 milliGRAM(s) IV Push every 12 hours  lactated ringers. 1000 milliLiter(s) (80 mL/Hr) IV Continuous <Continuous>  lactulose Syrup 20 Gram(s) Oral two times a day  levothyroxine 88 MICROGram(s) Oral daily  lidocaine   4% Patch 1 Patch Transdermal daily  midodrine. 5 milliGRAM(s) Oral three times a day  polyethylene glycol 3350 17 Gram(s) Oral daily  potassium chloride    Tablet ER 40 milliEquivalent(s) Oral every 4 hours  senna 2 Tablet(s) Oral at bedtime    MEDICATIONS  (PRN):  acetaminophen     Tablet .. 650 milliGRAM(s) Oral every 6 hours PRN Temp greater or equal to 38C (100.4F), Mild Pain (1 - 3), Moderate Pain (4 - 6)  ondansetron Injectable 4 milliGRAM(s) IV Push every 6 hours PRN Nausea and/or Vomiting  oxyCODONE    IR 5 milliGRAM(s) Oral every 6 hours PRN Moderate Pain (4 - 6)      CAPILLARY BLOOD GLUCOSE        I&O's Summary      PHYSICAL EXAM:  Vital Signs Last 24 Hrs  T(C): 36.8 (2022 12:34), Max: 37.4 (2022 00:08)  T(F): 98.2 (2022 12:34), Max: 99.3 (2022 00:08)  HR: 105 (2022 12:34) (89 - 113)  BP: 145/77 (2022 12:34) (100/67 - 148/78)  BP(mean): --  RR: 18 (2022 12:34) (18 - 18)  SpO2: 96% (2022 12:34) (94% - 98%)    CONSTITUTIONAL: NAD,  EYES: PERRLA; conjunctiva and sclera clear  ENMT: Moist oral mucosa,   RESPIRATORY: Normal respiratory effort; lungs are clear to auscultation bilaterally  CARDIOVASCULAR: Regular rate and rhythm, normal S1 and S2, no murmur   EXTS: trace lower extremity edema; Peripheral pulses are 2+ bilaterally  ABDOMEN: Nontender to palpation, normoactive bowel sounds, no rebound/guarding; incision site dry. no erythema  MUSCLOSKELETAL:   no clubbing or cyanosis of digits; lt knee --no joint swelling or tenderness to palpation. rom wnl.   PSYCH: affect appropriate  NEUROLOGY: A+O to person, place, and time; CN 2-12 are intact and symmetric; no gross sensory/motor  deficits;       LABS:                        12.8   8.60  )-----------( 276      ( 2022 04:17 )             39.3         142  |  106  |  15.4  ----------------------------<  136<H>  3.0<L>   |  22.0  |  0.77    Ca    8.3<L>      2022 09:36  Mg     2.2         TPro  5.8<L>  /  Alb  3.2<L>  /  TBili  0.4  /  DBili  x   /  AST  16  /  ALT  17  /  AlkPhos  101            Urinalysis Basic - ( 2022 13:36 )    Color: Yellow / Appearance: very cloudy / S.025 / pH: x  Gluc: x / Ketone: Small  / Bili: Negative / Urobili: Negative mg/dL   Blood: x / Protein: 100 mg/dL / Nitrite: Negative   Leuk Esterase: Moderate / RBC: 0-2 /HPF / WBC >50 /HPF   Sq Epi: x / Non Sq Epi: Occasional / Bacteria: Occasional        Culture - Blood (collected 2022 14:27)  Source: .Blood Blood  Preliminary Report (2022 15:01):    No growth at 48 hours    Culture - Blood (collected 2022 14:26)  Source: .Blood Blood  Preliminary Report (2022 15:01):    No growth at 48 hours        RADIOLOGY & ADDITIONAL TESTS:  Results Reviewed:   
SONAM JENSEN    [id number]    71y      Female    INTERVAL HPI/OVERNIGHT EVENTS: patient being seen for hypotension. Patient seen at bedside and complains of lower back pain.  Patient states feeling better      REVIEW OF SYSTEMS:    CONSTITUTIONAL: pain  RESPIRATORY: No cough, wheezing, hemoptysis; No shortness of breath  CARDIOVASCULAR: No chest pain, palpitations  GASTROINTESTINAL: No abdominal or epigastric pain. No nausea, vomiting  NEUROLOGICAL: No headaches, memory loss, loss of strength.  MISCELLANEOUS:      Vital Signs Last 24 Hrs  T(C): 36.9 (2022 07:59), Max: 37.3 (2022 04:20  T(F): 98.5 (2022 07:59), Max: 99.1 (2022 04:20)  HR: 103 (2022 13:06) (89 - 103)  BP: 93/57 (2022 13:06) (87/57 - 118/61)  BP(mean): --  RR: 20 (2022 07:59) (18 - 22)  SpO2: 95% (2022 07:59) (95% - 99%)    PHYSICAL EXAM:    General: pt. lying in bed not in distress.   HEENT: AT, NC. PERRL. intact EOM. oral mucosa somewhat dry, no throat erythema or exudate.   Neck: supple. no JVD.   Chest: CTA bilaterally  Heart: S1,S2. RRR. no heart murmur. no edema.   Abdomen: soft. obese+ BS. mild tenderness in lower abd. area, no RT, no guarding.  rectal : deferred by pt, had one by surgery team and no stool in rectal vault or rectal blood reported.  Ext: no calf tenderness, distal pulses intact.   Neuro: AAO x3. no focal weakness. no speech disorder, cns ii to xii intact.  Skin: warm and dry, no diaphoresis, no pallor.  lymphatic system : no lymphadenopathy noted.   psych : normal affect, no si/hi.      LABS:                        13.0   9.09  )-----------( 274      ( 2022 03:02 )             40.8         139  |  105  |  14.6  ----------------------------<  128<H>  3.4<L>   |  17.0<L>  |  2.25<H>    Ca    8.1<L>      2022 03:02    TPro  5.5<L>  /  Alb  3.0<L>  /  TBili  0.4  /  DBili  x   /  AST  31  /  ALT  27  /  AlkPhos  103      PT/INR - ( 2022 14:11 )   PT: 12.5 sec;   INR: 1.08 ratio         PTT - ( 2022 14:11 )  PTT:30.2 sec  Urinalysis Basic - ( 2022 06:31 )    Color: Yellow / Appearance: Clear / S.020 / pH: x  Gluc: x / Ketone: Small  / Bili: Negative / Urobili: Negative mg/dL   Blood: x / Protein: 100 mg/dL / Nitrite: Negative   Leuk Esterase: Moderate / RBC: 11-25 /HPF / WBC >50 /HPF   Sq Epi: x / Non Sq Epi: Occasional / Bacteria: Occasional          MEDICATIONS  (STANDING):  atorvastatin 10 milliGRAM(s) Oral at bedtime  cefTRIAXone   IVPB      cefTRIAXone   IVPB 1000 milliGRAM(s) IV Intermittent every 24 hours  heparin   Injectable 5000 Unit(s) SubCutaneous every 8 hours  hydrocortisone sodium succinate Injectable 50 milliGRAM(s) IV Push every 8 hours  lactated ringers. 1000 milliLiter(s) (110 mL/Hr) IV Continuous <Continuous>  lactulose Syrup 20 Gram(s) Oral two times a day  levothyroxine 88 MICROGram(s) Oral daily  lidocaine   4% Patch 1 Patch Transdermal daily  midodrine. 5 milliGRAM(s) Oral three times a day  polyethylene glycol 3350 17 Gram(s) Oral daily  potassium chloride   Powder 40 milliEquivalent(s) Oral once  senna 2 Tablet(s) Oral at bedtime  sodium bicarbonate  Infusion 0.109 mEq/kG/Hr (125 mL/Hr) IV Continuous <Continuous>    MEDICATIONS  (PRN):  acetaminophen     Tablet .. 650 milliGRAM(s) Oral every 6 hours PRN Temp greater or equal to 38C (100.4F), Mild Pain (1 - 3), Moderate Pain (4 - 6)  ondansetron Injectable 4 milliGRAM(s) IV Push every 6 hours PRN Nausea and/or Vomiting  oxyCODONE    IR 5 milliGRAM(s) Oral every 6 hours PRN Moderate Pain (4 - 6)      RADIOLOGY & ADDITIONAL TESTS:

## 2022-02-02 NOTE — PROGRESS NOTE ADULT - REASON FOR ADMISSION
MILDRED/ Hypotension, constipation.

## 2022-02-02 NOTE — DISCHARGE NOTE NURSING/CASE MANAGEMENT/SOCIAL WORK - NSDCPEFALRISK_GEN_ALL_CORE
For information on Fall & Injury Prevention, visit: [email or web address] OR  [email or web address] OR  https://www.cdc.gov/steadi/patient.html

## 2022-02-02 NOTE — DISCHARGE NOTE NURSING/CASE MANAGEMENT/SOCIAL WORK - PATIENT PORTAL LINK FT
You can access the FollowMyHealth Patient Portal offered by [facility] by registering at the following website: [email or web address]. By joining [employer]’s FollowMyHealth portal, you will also be able to view your health information using other applications (apps) compatible with our system.

## 2022-02-02 NOTE — PROGRESS NOTE ADULT - ASSESSMENT
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 Brown), hx of hyperthyroidism s/p FELDMAN now hypothyroid, HTN p/w hypotension at 80/40 with lack of appetite, nausea, dry heaves and constipation. In the ER, patient noted to have georgi Cr 2.37, bp responding to iv fluids. pt. is on metoprolol 100 mg in am and 50 mg in pm, also on losartan 100 mg daily, and amlodipine 5 mg daily. Consult for adrenal insufficiency.  outpatient chart reviewed extensively  patient follows with our clinic and started to see us in 2020 but was last seen 7/2021  patient initially had incidentally noted L adrenal adenoma on MRI L spine. patient was symptomatic with weight gain over 3 years without changes in diet or exercise. further workup reviewed 2 L adrenal adenomas (1.5cm and 3cm) and biochemical studies showed elevated midnight salivary cortisol level 0.34 (should be <0.09), 1mg DST never suppressed under 10 and random cortisol normal with suppressed ACTH (>5). Other labs normal    1. Hypotension in setting of recent L adrenalectomy for Cushing's syndrome  - Continue hydrocortisone 20mg at 8am   - Continue hydrocortisone 15mg at 2pm    Follow up Endocrine apps:  March 2nd, 9:00 with Judith at [facility] in [place]  May 10th, 9:40 with Dr. Patricia at [facility] in [place]    2. Hypothyroidism  - Normal TFTs  - Continue LT4 88mcg    3. 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 Brown), hx of hyperthyroidism s/p FELDMAN now hypothyroid, HTN p/w hypotension at 80/40 with lack of appetite, nausea, dry heaves and constipation. In the ER, patient noted to have georgi Cr 2.37, bp responding to iv fluids. pt. is on metoprolol 100 mg in am and 50 mg in pm, also on losartan 100 mg daily, and amlodipine 5 mg daily. Consult for adrenal insufficiency.  outpatient chart reviewed extensively  patient follows with our clinic and started to see us in 2020 but was last seen 7/2021  patient initially had incidentally noted L adrenal adenoma on MRI L spine. patient was symptomatic with weight gain over 3 years without changes in diet or exercise. further workup reviewed 2 L adrenal adenomas (1.5cm and 3cm) and biochemical studies showed elevated midnight salivary cortisol level 0.34 (should be <0.09), 1mg DST never suppressed under 10 and random cortisol normal with suppressed ACTH (>5). Other labs normal    1. Hypotension in setting of recent L adrenalectomy for Cushing's syndrome  - Continue hydrocortisone 20mg at 8am   - Continue hydrocortisone 15mg at 2pm    Follow up Endocrine apps:  March 2nd, 9:00 with Judith at [facility] in [place]  May 10th, 9:40 with Dr. Patricia at [facility] in [place]    2. Hypothyroidism  - Normal TFTs  - Continue LT4 88mcg    3. GEORGI - improved  - Renal following    Above plan discussed with Dr. Fong

## 2022-02-03 ENCOUNTER — INPATIENT (INPATIENT)
Facility: HOSPITAL | Age: 72
LOS: 5 days | Discharge: NURSING, REHAB OR LONG-TERM CARE | DRG: 176 | End: 2022-02-09
Attending: STUDENT IN AN ORGANIZED HEALTH CARE EDUCATION/TRAINING PROGRAM | Admitting: HOSPITALIST
Payer: COMMERCIAL

## 2022-02-03 VITALS
SYSTOLIC BLOOD PRESSURE: 131 MMHG | RESPIRATION RATE: 18 BRPM | HEART RATE: 83 BPM | OXYGEN SATURATION: 96 % | DIASTOLIC BLOOD PRESSURE: 76 MMHG | TEMPERATURE: 98 F | HEIGHT: 63 IN

## 2022-02-03 DIAGNOSIS — Z98.49 CATARACT EXTRACTION STATUS, UNSPECIFIED EYE: Chronic | ICD-10-CM

## 2022-02-03 DIAGNOSIS — Z90.89 ACQUIRED ABSENCE OF OTHER ORGANS: Chronic | ICD-10-CM

## 2022-02-03 DIAGNOSIS — Z90.710 ACQUIRED ABSENCE OF BOTH CERVIX AND UTERUS: Chronic | ICD-10-CM

## 2022-02-03 DIAGNOSIS — I26.99 OTHER PULMONARY EMBOLISM WITHOUT ACUTE COR PULMONALE: ICD-10-CM

## 2022-02-03 DIAGNOSIS — E89.6 POSTPROCEDURAL ADRENOCORTICAL (-MEDULLARY) HYPOFUNCTION: Chronic | ICD-10-CM

## 2022-02-03 LAB
ALBUMIN SERPL ELPH-MCNC: 2.8 G/DL — LOW (ref 3.3–5.2)
ALP SERPL-CCNC: 99 U/L — SIGNIFICANT CHANGE UP (ref 40–120)
ALT FLD-CCNC: 42 U/L — HIGH
ANION GAP SERPL CALC-SCNC: 13 MMOL/L — SIGNIFICANT CHANGE UP (ref 5–17)
APPEARANCE UR: CLEAR — SIGNIFICANT CHANGE UP
APTT BLD: 25.2 SEC — LOW (ref 27.5–35.5)
AST SERPL-CCNC: 57 U/L — HIGH
BACTERIA # UR AUTO: ABNORMAL
BASOPHILS # BLD AUTO: 0 K/UL — SIGNIFICANT CHANGE UP (ref 0–0.2)
BASOPHILS NFR BLD AUTO: 0 % — SIGNIFICANT CHANGE UP (ref 0–2)
BILIRUB SERPL-MCNC: 0.6 MG/DL — SIGNIFICANT CHANGE UP (ref 0.4–2)
BILIRUB UR-MCNC: NEGATIVE — SIGNIFICANT CHANGE UP
BUN SERPL-MCNC: 7.4 MG/DL — LOW (ref 8–20)
CALCIUM SERPL-MCNC: 8.5 MG/DL — LOW (ref 8.6–10.2)
CHLORIDE SERPL-SCNC: 103 MMOL/L — SIGNIFICANT CHANGE UP (ref 98–107)
CO2 SERPL-SCNC: 22 MMOL/L — SIGNIFICANT CHANGE UP (ref 22–29)
COLOR SPEC: YELLOW — SIGNIFICANT CHANGE UP
CREAT SERPL-MCNC: 0.51 MG/DL — SIGNIFICANT CHANGE UP (ref 0.5–1.3)
DIFF PNL FLD: NEGATIVE — SIGNIFICANT CHANGE UP
EOSINOPHIL # BLD AUTO: 0.29 K/UL — SIGNIFICANT CHANGE UP (ref 0–0.5)
EOSINOPHIL NFR BLD AUTO: 2.6 % — SIGNIFICANT CHANGE UP (ref 0–6)
EPI CELLS # UR: SIGNIFICANT CHANGE UP
GAS PNL BLDV: SIGNIFICANT CHANGE UP
GLUCOSE SERPL-MCNC: 84 MG/DL — SIGNIFICANT CHANGE UP (ref 70–99)
GLUCOSE UR QL: NEGATIVE MG/DL — SIGNIFICANT CHANGE UP
HCT VFR BLD CALC: 34.9 % — SIGNIFICANT CHANGE UP (ref 34.5–45)
HGB BLD-MCNC: 11.2 G/DL — LOW (ref 11.5–15.5)
INR BLD: 0.98 RATIO — SIGNIFICANT CHANGE UP (ref 0.88–1.16)
KETONES UR-MCNC: NEGATIVE — SIGNIFICANT CHANGE UP
LEUKOCYTE ESTERASE UR-ACNC: ABNORMAL
LYMPHOCYTES # BLD AUTO: 1.99 K/UL — SIGNIFICANT CHANGE UP (ref 1–3.3)
LYMPHOCYTES # BLD AUTO: 17.6 % — SIGNIFICANT CHANGE UP (ref 13–44)
MCHC RBC-ENTMCNC: 29.4 PG — SIGNIFICANT CHANGE UP (ref 27–34)
MCHC RBC-ENTMCNC: 32.1 GM/DL — SIGNIFICANT CHANGE UP (ref 32–36)
MCV RBC AUTO: 91.6 FL — SIGNIFICANT CHANGE UP (ref 80–100)
MONOCYTES # BLD AUTO: 1.58 K/UL — HIGH (ref 0–0.9)
MONOCYTES NFR BLD AUTO: 14 % — SIGNIFICANT CHANGE UP (ref 2–14)
NEUTROPHILS # BLD AUTO: 7.45 K/UL — HIGH (ref 1.8–7.4)
NEUTROPHILS NFR BLD AUTO: 65.8 % — SIGNIFICANT CHANGE UP (ref 43–77)
NITRITE UR-MCNC: NEGATIVE — SIGNIFICANT CHANGE UP
NT-PROBNP SERPL-SCNC: 948 PG/ML — HIGH (ref 0–300)
PH UR: 7 — SIGNIFICANT CHANGE UP (ref 5–8)
PLATELET # BLD AUTO: 248 K/UL — SIGNIFICANT CHANGE UP (ref 150–400)
POTASSIUM SERPL-MCNC: 3.5 MMOL/L — SIGNIFICANT CHANGE UP (ref 3.5–5.3)
POTASSIUM SERPL-SCNC: 3.5 MMOL/L — SIGNIFICANT CHANGE UP (ref 3.5–5.3)
PROT SERPL-MCNC: 5.4 G/DL — LOW (ref 6.6–8.7)
PROT UR-MCNC: NEGATIVE — SIGNIFICANT CHANGE UP
PROTHROM AB SERPL-ACNC: 11.4 SEC — SIGNIFICANT CHANGE UP (ref 10.6–13.6)
RBC # BLD: 3.81 M/UL — SIGNIFICANT CHANGE UP (ref 3.8–5.2)
RBC # FLD: 16.3 % — HIGH (ref 10.3–14.5)
SARS-COV-2 RNA SPEC QL NAA+PROBE: SIGNIFICANT CHANGE UP
SODIUM SERPL-SCNC: 138 MMOL/L — SIGNIFICANT CHANGE UP (ref 135–145)
SP GR SPEC: 1.01 — SIGNIFICANT CHANGE UP (ref 1.01–1.02)
TROPONIN T SERPL-MCNC: <0.01 NG/ML — SIGNIFICANT CHANGE UP (ref 0–0.06)
UROBILINOGEN FLD QL: NEGATIVE MG/DL — SIGNIFICANT CHANGE UP
WBC # BLD: 11.32 K/UL — HIGH (ref 3.8–10.5)
WBC # FLD AUTO: 11.32 K/UL — HIGH (ref 3.8–10.5)
WBC UR QL: SIGNIFICANT CHANGE UP /HPF (ref 0–5)

## 2022-02-03 PROCEDURE — 71275 CT ANGIOGRAPHY CHEST: CPT | Mod: 26,MA

## 2022-02-03 PROCEDURE — 99285 EMERGENCY DEPT VISIT HI MDM: CPT

## 2022-02-03 PROCEDURE — 71045 X-RAY EXAM CHEST 1 VIEW: CPT | Mod: 26

## 2022-02-03 PROCEDURE — 93970 EXTREMITY STUDY: CPT | Mod: 26

## 2022-02-03 PROCEDURE — 99223 1ST HOSP IP/OBS HIGH 75: CPT

## 2022-02-03 RX ORDER — HEPARIN SODIUM 5000 [USP'U]/ML
8500 INJECTION INTRAVENOUS; SUBCUTANEOUS EVERY 6 HOURS
Refills: 0 | Status: DISCONTINUED | OUTPATIENT
Start: 2022-02-03 | End: 2022-02-07

## 2022-02-03 RX ORDER — HEPARIN SODIUM 5000 [USP'U]/ML
4000 INJECTION INTRAVENOUS; SUBCUTANEOUS EVERY 6 HOURS
Refills: 0 | Status: DISCONTINUED | OUTPATIENT
Start: 2022-02-03 | End: 2022-02-07

## 2022-02-03 RX ORDER — HEPARIN SODIUM 5000 [USP'U]/ML
8500 INJECTION INTRAVENOUS; SUBCUTANEOUS ONCE
Refills: 0 | Status: COMPLETED | OUTPATIENT
Start: 2022-02-03 | End: 2022-02-03

## 2022-02-03 RX ORDER — SODIUM CHLORIDE 9 MG/ML
500 INJECTION INTRAMUSCULAR; INTRAVENOUS; SUBCUTANEOUS ONCE
Refills: 0 | Status: COMPLETED | OUTPATIENT
Start: 2022-02-03 | End: 2022-02-03

## 2022-02-03 RX ORDER — HEPARIN SODIUM 5000 [USP'U]/ML
INJECTION INTRAVENOUS; SUBCUTANEOUS
Qty: 25000 | Refills: 0 | Status: DISCONTINUED | OUTPATIENT
Start: 2022-02-03 | End: 2022-02-07

## 2022-02-03 RX ORDER — DIPHENHYDRAMINE HCL 50 MG
50 CAPSULE ORAL ONCE
Refills: 0 | Status: COMPLETED | OUTPATIENT
Start: 2022-02-03 | End: 2022-02-03

## 2022-02-03 RX ADMIN — SODIUM CHLORIDE 500 MILLILITER(S): 9 INJECTION INTRAMUSCULAR; INTRAVENOUS; SUBCUTANEOUS at 12:00

## 2022-02-03 RX ADMIN — SODIUM CHLORIDE 500 MILLILITER(S): 9 INJECTION INTRAMUSCULAR; INTRAVENOUS; SUBCUTANEOUS at 15:54

## 2022-02-03 RX ADMIN — HEPARIN SODIUM 1800 UNIT(S)/HR: 5000 INJECTION INTRAVENOUS; SUBCUTANEOUS at 22:05

## 2022-02-03 RX ADMIN — Medication 125 MILLIGRAM(S): at 13:48

## 2022-02-03 RX ADMIN — HEPARIN SODIUM 8500 UNIT(S): 5000 INJECTION INTRAVENOUS; SUBCUTANEOUS at 22:04

## 2022-02-03 RX ADMIN — Medication 50 MILLIGRAM(S): at 18:00

## 2022-02-03 NOTE — ED ADULT NURSE REASSESSMENT NOTE - NS ED NURSE REASSESS COMMENT FT1
Assumed care of patient in b3l. Pt a&ox4 rr even and unlabored. Pt awaiting admission for pe's. pt educated on plan of care, pt able to successfully teach back plan of care to RN, RN will continue to reeducate pt during hospital stay.

## 2022-02-03 NOTE — ED PROVIDER NOTE - PHYSICAL EXAMINATION
[Const] well-appearing, resting comfortably, no acute distress  [HEENT] PERRL, EOMI, moist mucus membranes  [Neck] Supple, trachea midline  [CV] +S1/S2, no m/r/g appreciated, no CW ttp  [Lungs] Clear to auscultations bilaterally, no adventitious lung sounds  [Abd] soft, non-tender, nondistended in all 4 quadrants  [MSK] moving all extremities, 2+ lower extremity swelling non-pitting R > L however mild L calf TTP  [Skin] warm, dry, well-perfused  [Neuro] A&Ox3

## 2022-02-03 NOTE — ED PROVIDER NOTE - OBJECTIVE STATEMENT
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 Brown), hx of hypothyroidism, HTN presents with right pleuritic chest pain with inhalation, lower extremity swelling with pain L > R, low grade temp this morning in setting of not walking very much over past 30 days 2/2 being in hospital and being at rehab facility. Patient's rehab physician was concerned about pulmonary embolism, patient was sent in for further evaluation. Patient denies active chest pain unless she takes deep breaths, denies palpitations, difficulty breathing, denies nausea/vomiting.    pmh: Cushing's syndrome s/p L adrenalectomy, hypothyroidism, HTN  meds: synthroid, amlodipine, pravastatin  allergies: sulfa drugs, IV contrast but states she has had IV contrast before (most recently 1/10/22) but requires pre-medication protocol  social: quit smoking 4 years ago

## 2022-02-03 NOTE — H&P ADULT - ASSESSMENT
pt. 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 Brown), hx of hypothyroidism, HTN presents with right pleuritic chest pain with inhalation, lower extremity swelling with pain L > R, . pt. was discharged to a rehab yesterday after she was admitted at our facility for hypotensio, georgi, constipation. pt. was on subcut heparin for dvt prophylaxis. Patient's rehab physician was concerned about pulmonary embolism, patient was sent in for further evaluation. Patient denies active chest pain unless she takes deep breaths, denies palpitations, difficulty breathing, denies nausea/vomiting. no abd. pain.

## 2022-02-03 NOTE — H&P ADULT - PROBLEM SELECTOR PLAN 7
pt. reports leg muscle pain to touch,  no dvt,  will use Lidoderm patch, hold statin for now, will check ck, trend ast 57, alt 42, were normal range recently.

## 2022-02-03 NOTE — H&P ADULT - HISTORY OF PRESENT ILLNESS
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 Brown), hx of hypothyroidism, HTN presents with right pleuritic chest pain with inhalation, lower extremity swelling with pain L > R, low grade temp this morning in setting of not walking very much over past 30 days 2/2 being in hospital and being at rehab facility. Patient's rehab physician was concerned about pulmonary embolism, patient was sent in for further evaluation. Patient denies active chest pain unless she takes deep breaths, denies palpitations, difficulty breathing, denies nausea/vomiting.   pt. 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 Brown), hx of hypothyroidism, HTN presents with right pleuritic chest pain with inhalation, lower extremity swelling with pain L > R, . pt. was discharged to a rehab yesterday after she was admitted at our facility for hypotensio, georgi, constipation. pt. was on subcut heparin for dvt prophylaxis. Patient's rehab physician was concerned about pulmonary embolism, patient was sent in for further evaluation. Patient denies active chest pain unless she takes deep breaths, denies palpitations, difficulty breathing, denies nausea/vomiting. no abd. pain.

## 2022-02-03 NOTE — ED ADULT NURSE NOTE - CHIEF COMPLAINT QUOTE
Patient BIBA to ED from [facility] from rehab floor with c/o edema to her LE's, SOB, back pains.

## 2022-02-03 NOTE — ED ADULT TRIAGE NOTE - CHIEF COMPLAINT QUOTE
Patient BIBA to ED from [facility] from rehab floor with c/o edema to her LE's, SOB, back pains.

## 2022-02-03 NOTE — ED PROVIDER NOTE - CLINICAL SUMMARY MEDICAL DECISION MAKING FREE TEXT BOX
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 Brown), hx of hypothyroidism, HTN presents w/ R pleuritic cp, leg swelling. R/o PE, PNA, febrile 102 earlier today. Obtain labs, bcx, ua, pre-medicate for CTA, duplex LE r/o DVT. Dispo pending based on work-up.

## 2022-02-03 NOTE — H&P ADULT - NSHPPHYSICALEXAM_GEN_ALL_CORE
General: pt. lying in bed not in distress.   HEENT: AT, NC. PERRL. intact EOM. oral mucosa somewhat dry, no throat erythema or exudate.   Neck: supple. no JVD.   Chest: CTA bilaterally  Heart: S1,S2. RRR. no heart murmur. trace b/l lower ext edema.   Abdomen: soft. obese+ BS. non tender, no guarding.  Ext: no calf tenderness but sensitive to touch over lower ext B/L, distal pulses intact. moves all ect. independently.  Neuro: AAO x3. no focal weakness. no speech disorder, cns ii to xii intact.  Skin: warm and dry, no diaphoresis, no pallor.  lymphatic system : no lymphadenopathy noted.   psych : normal affect, no si/hi. General: pt. lying in bed not in distress.   HEENT: AT, NC. PERRL. intact EOM. oral mucosa somewhat dry, no throat erythema or exudate.   Neck: supple. no JVD.   Chest: CTA bilaterally  Heart: S1,S2. RRR. no heart murmur. trace b/l lower ext edema.   Abdomen: soft. obese+ BS. non tender, no guarding.  Ext: no calf tenderness but sensitive to touch over lower ext . left > right.  distal pulses intact. moves all ect. independently.  Neuro: AAO x3. no focal weakness. no speech disorder, cns ii to xii intact.  Skin: warm and dry, no diaphoresis, no pallor.  lymphatic system : no lymphadenopathy noted.   psych : normal affect, no si/hi. General: pt. lying in bed not in distress.   HEENT: AT, NC. PERRL. intact EOM. oral mucosa somewhat dry, no throat erythema or exudate.   Neck: supple. no JVD.   Chest: CTA bilaterally  Heart: S1,S2. RRR. no heart murmur. trace b/l lower ext edema.   Abdomen: soft. obese+ BS. non tender, no guarding.  Ext: no calf tenderness but sensitive and feels pain to touch mostly anteriorly over lower ext . left > right.  distal pulses intact. moves all ext. independently.  Neuro: AAO x3. no focal weakness. no speech disorder, cns ii to xii intact.  Skin: warm and dry, no diaphoresis, no pallor.  lymphatic system : no lymphadenopathy noted.   psych : normal affect, no si/hi.

## 2022-02-03 NOTE — H&P ADULT - PROBLEM SELECTOR PLAN 1
possible pt's decreased mobility contributed to PE, no rt. heart strain on ct scan. as per ct angio :  Emboli within bilateral lower lobe pulmonary arteries. No CT evidence of   right heart strain.  - pt. on iv heparin. will get echo.

## 2022-02-03 NOTE — ED ADULT NURSE NOTE - OBJECTIVE STATEMENT
received pt from nursing home. pt co pain in back upon inspiration. st on monitor 100.1 rectal temp. Dr Wilks notified and aware. ivl placed bw sent pt oriented to unit and procedures.  edema noted bilateral feet to lower abdomen. all clothing removed pt placed in gown and skin inspected no breakdown noted. purwick applied for comfort and dryness.  resp unlabored. denies angina or sob. pt oriented to unit and procedures. nikko mandel

## 2022-02-04 DIAGNOSIS — I10 ESSENTIAL (PRIMARY) HYPERTENSION: ICD-10-CM

## 2022-02-04 DIAGNOSIS — E88.09 OTHER DISORDERS OF PLASMA-PROTEIN METABOLISM, NOT ELSEWHERE CLASSIFIED: ICD-10-CM

## 2022-02-04 DIAGNOSIS — I26.99 OTHER PULMONARY EMBOLISM WITHOUT ACUTE COR PULMONALE: ICD-10-CM

## 2022-02-04 DIAGNOSIS — E66.01 MORBID (SEVERE) OBESITY DUE TO EXCESS CALORIES: ICD-10-CM

## 2022-02-04 DIAGNOSIS — E89.6 POSTPROCEDURAL ADRENOCORTICAL (-MEDULLARY) HYPOFUNCTION: ICD-10-CM

## 2022-02-04 DIAGNOSIS — E78.5 HYPERLIPIDEMIA, UNSPECIFIED: ICD-10-CM

## 2022-02-04 DIAGNOSIS — E03.9 HYPOTHYROIDISM, UNSPECIFIED: ICD-10-CM

## 2022-02-04 LAB
ALBUMIN SERPL ELPH-MCNC: 2.8 G/DL — LOW (ref 3.3–5.2)
ALP SERPL-CCNC: 106 U/L — SIGNIFICANT CHANGE UP (ref 40–120)
ALT FLD-CCNC: 42 U/L — HIGH
ANION GAP SERPL CALC-SCNC: 15 MMOL/L — SIGNIFICANT CHANGE UP (ref 5–17)
APTT BLD: 116.9 SEC — HIGH (ref 27.5–35.5)
APTT BLD: 135.7 SEC — CRITICAL HIGH (ref 27.5–35.5)
APTT BLD: 70.2 SEC — HIGH (ref 27.5–35.5)
APTT BLD: 76 SEC — HIGH (ref 27.5–35.5)
APTT BLD: >200 SEC — CRITICAL HIGH (ref 27.5–35.5)
AST SERPL-CCNC: 44 U/L — HIGH
BASOPHILS # BLD AUTO: 0.03 K/UL — SIGNIFICANT CHANGE UP (ref 0–0.2)
BASOPHILS NFR BLD AUTO: 0.2 % — SIGNIFICANT CHANGE UP (ref 0–2)
BILIRUB SERPL-MCNC: 0.4 MG/DL — SIGNIFICANT CHANGE UP (ref 0.4–2)
BUN SERPL-MCNC: 7.2 MG/DL — LOW (ref 8–20)
CALCIUM SERPL-MCNC: 8.6 MG/DL — SIGNIFICANT CHANGE UP (ref 8.6–10.2)
CHLORIDE SERPL-SCNC: 102 MMOL/L — SIGNIFICANT CHANGE UP (ref 98–107)
CK SERPL-CCNC: 22 U/L — LOW (ref 25–170)
CO2 SERPL-SCNC: 20 MMOL/L — LOW (ref 22–29)
CORTICOSTEROID BINDING GLOBULIN RESULT: 2.1 MG/DL — SIGNIFICANT CHANGE UP
CORTIS F/TOTAL MFR SERPL: 2.7 % — SIGNIFICANT CHANGE UP
CORTIS SERPL-MCNC: 1.5 UG/DL — LOW
CORTISOL, FREE RESULT: 0.04 UG/DL — LOW
CREAT SERPL-MCNC: 0.44 MG/DL — LOW (ref 0.5–1.3)
EOSINOPHIL # BLD AUTO: 0 K/UL — SIGNIFICANT CHANGE UP (ref 0–0.5)
EOSINOPHIL NFR BLD AUTO: 0 % — SIGNIFICANT CHANGE UP (ref 0–6)
GLUCOSE SERPL-MCNC: 139 MG/DL — HIGH (ref 70–99)
HCT VFR BLD CALC: 38.7 % — SIGNIFICANT CHANGE UP (ref 34.5–45)
HGB BLD-MCNC: 12.4 G/DL — SIGNIFICANT CHANGE UP (ref 11.5–15.5)
IMM GRANULOCYTES NFR BLD AUTO: 2.8 % — HIGH (ref 0–1.5)
LYMPHOCYTES # BLD AUTO: 0.85 K/UL — LOW (ref 1–3.3)
LYMPHOCYTES # BLD AUTO: 5.5 % — LOW (ref 13–44)
MCHC RBC-ENTMCNC: 28.9 PG — SIGNIFICANT CHANGE UP (ref 27–34)
MCHC RBC-ENTMCNC: 32 GM/DL — SIGNIFICANT CHANGE UP (ref 32–36)
MCV RBC AUTO: 90.2 FL — SIGNIFICANT CHANGE UP (ref 80–100)
MONOCYTES # BLD AUTO: 1.08 K/UL — HIGH (ref 0–0.9)
MONOCYTES NFR BLD AUTO: 6.9 % — SIGNIFICANT CHANGE UP (ref 2–14)
NEUTROPHILS # BLD AUTO: 13.19 K/UL — HIGH (ref 1.8–7.4)
NEUTROPHILS NFR BLD AUTO: 84.6 % — HIGH (ref 43–77)
PLATELET # BLD AUTO: 288 K/UL — SIGNIFICANT CHANGE UP (ref 150–400)
POTASSIUM SERPL-MCNC: 3.8 MMOL/L — SIGNIFICANT CHANGE UP (ref 3.5–5.3)
POTASSIUM SERPL-SCNC: 3.8 MMOL/L — SIGNIFICANT CHANGE UP (ref 3.5–5.3)
PREALB SERPL-MCNC: 16 MG/DL — LOW (ref 18–38)
PROT SERPL-MCNC: 5.9 G/DL — LOW (ref 6.6–8.7)
RBC # BLD: 4.29 M/UL — SIGNIFICANT CHANGE UP (ref 3.8–5.2)
RBC # FLD: 15.9 % — HIGH (ref 10.3–14.5)
SODIUM SERPL-SCNC: 136 MMOL/L — SIGNIFICANT CHANGE UP (ref 135–145)
WBC # BLD: 15.59 K/UL — HIGH (ref 3.8–10.5)
WBC # FLD AUTO: 15.59 K/UL — HIGH (ref 3.8–10.5)

## 2022-02-04 PROCEDURE — 93306 TTE W/DOPPLER COMPLETE: CPT | Mod: 26

## 2022-02-04 PROCEDURE — 99232 SBSQ HOSP IP/OBS MODERATE 35: CPT

## 2022-02-04 RX ORDER — SENNA PLUS 8.6 MG/1
2 TABLET ORAL AT BEDTIME
Refills: 0 | Status: DISCONTINUED | OUTPATIENT
Start: 2022-02-04 | End: 2022-02-09

## 2022-02-04 RX ORDER — LEVOTHYROXINE SODIUM 125 MCG
88 TABLET ORAL DAILY
Refills: 0 | Status: DISCONTINUED | OUTPATIENT
Start: 2022-02-04 | End: 2022-02-09

## 2022-02-04 RX ORDER — METOPROLOL TARTRATE 50 MG
25 TABLET ORAL ONCE
Refills: 0 | Status: COMPLETED | OUTPATIENT
Start: 2022-02-04 | End: 2022-02-04

## 2022-02-04 RX ORDER — HYDROCORTISONE 20 MG
15 TABLET ORAL DAILY
Refills: 0 | Status: DISCONTINUED | OUTPATIENT
Start: 2022-02-04 | End: 2022-02-06

## 2022-02-04 RX ORDER — HYDROCORTISONE 20 MG
20 TABLET ORAL DAILY
Refills: 0 | Status: DISCONTINUED | OUTPATIENT
Start: 2022-02-04 | End: 2022-02-06

## 2022-02-04 RX ORDER — LIDOCAINE 4 G/100G
1 CREAM TOPICAL EVERY 24 HOURS
Refills: 0 | Status: DISCONTINUED | OUTPATIENT
Start: 2022-02-04 | End: 2022-02-09

## 2022-02-04 RX ORDER — METOPROLOL TARTRATE 50 MG
25 TABLET ORAL EVERY 12 HOURS
Refills: 0 | Status: DISCONTINUED | OUTPATIENT
Start: 2022-02-04 | End: 2022-02-09

## 2022-02-04 RX ORDER — POLYETHYLENE GLYCOL 3350 17 G/17G
17 POWDER, FOR SOLUTION ORAL DAILY
Refills: 0 | Status: DISCONTINUED | OUTPATIENT
Start: 2022-02-04 | End: 2022-02-09

## 2022-02-04 RX ORDER — LANOLIN ALCOHOL/MO/W.PET/CERES
3 CREAM (GRAM) TOPICAL AT BEDTIME
Refills: 0 | Status: COMPLETED | OUTPATIENT
Start: 2022-02-04 | End: 2022-02-04

## 2022-02-04 RX ORDER — LANOLIN ALCOHOL/MO/W.PET/CERES
3 CREAM (GRAM) TOPICAL AT BEDTIME
Refills: 0 | Status: DISCONTINUED | OUTPATIENT
Start: 2022-02-04 | End: 2022-02-09

## 2022-02-04 RX ORDER — ONDANSETRON 8 MG/1
4 TABLET, FILM COATED ORAL EVERY 6 HOURS
Refills: 0 | Status: DISCONTINUED | OUTPATIENT
Start: 2022-02-04 | End: 2022-02-09

## 2022-02-04 RX ADMIN — POLYETHYLENE GLYCOL 3350 17 GRAM(S): 17 POWDER, FOR SOLUTION ORAL at 08:45

## 2022-02-04 RX ADMIN — LIDOCAINE 1 PATCH: 4 CREAM TOPICAL at 17:29

## 2022-02-04 RX ADMIN — HEPARIN SODIUM 1500 UNIT(S)/HR: 5000 INJECTION INTRAVENOUS; SUBCUTANEOUS at 02:54

## 2022-02-04 RX ADMIN — LIDOCAINE 1 PATCH: 4 CREAM TOPICAL at 07:07

## 2022-02-04 RX ADMIN — Medication 15 MILLIGRAM(S): at 14:40

## 2022-02-04 RX ADMIN — Medication 25 MILLIGRAM(S): at 05:40

## 2022-02-04 RX ADMIN — Medication 20 MILLIGRAM(S): at 08:45

## 2022-02-04 RX ADMIN — LIDOCAINE 1 PATCH: 4 CREAM TOPICAL at 05:40

## 2022-02-04 RX ADMIN — Medication 25 MILLIGRAM(S): at 01:27

## 2022-02-04 RX ADMIN — SENNA PLUS 2 TABLET(S): 8.6 TABLET ORAL at 23:48

## 2022-02-04 RX ADMIN — HEPARIN SODIUM 0 UNIT(S)/HR: 5000 INJECTION INTRAVENOUS; SUBCUTANEOUS at 01:51

## 2022-02-04 RX ADMIN — Medication 30 MILLILITER(S): at 23:48

## 2022-02-04 RX ADMIN — Medication 88 MICROGRAM(S): at 05:40

## 2022-02-04 RX ADMIN — HEPARIN SODIUM 1300 UNIT(S)/HR: 5000 INJECTION INTRAVENOUS; SUBCUTANEOUS at 17:04

## 2022-02-04 RX ADMIN — Medication 25 MILLIGRAM(S): at 17:04

## 2022-02-04 RX ADMIN — HEPARIN SODIUM 1300 UNIT(S)/HR: 5000 INJECTION INTRAVENOUS; SUBCUTANEOUS at 10:50

## 2022-02-04 RX ADMIN — Medication 3 MILLIGRAM(S): at 23:48

## 2022-02-04 NOTE — PROVIDER CONTACT NOTE (CRITICAL VALUE NOTIFICATION) - ACTION/TREATMENT ORDERED:
MD Delgado made aware. Will follow protocol as per MD order.
RN followed heparin nomogram protocol. Heparin drip stopped for one hour according to heparin nomogram.

## 2022-02-04 NOTE — PATIENT PROFILE ADULT - NSPROMEDSADMININFO_GEN_A_NUR
26 - Received report from Dexter Duane, [facility]. Pt is alert, confused and stable. No complaints or signs of distress or pain at this time. Will continue to monitor. 2330 - Pt resting quietly and stable. Keeps moving right arm from bed to over head. Will continue to monitor. 5/13/18  0400 - Attempted to draw blood from CVL in left neck. Only small amount of blood would return but not enough for a blood draw. Repositioning pt different ways and trying to manipulate CVL did not help either. Giovanna bender, phlebotomist was able to draw sample from pt. Pt tolerated well. Will continue to monitor. no concerns

## 2022-02-04 NOTE — PATIENT PROFILE ADULT - FALL HARM RISK - HARM RISK INTERVENTIONS

## 2022-02-04 NOTE — PROVIDER CONTACT NOTE (CRITICAL VALUE NOTIFICATION) - RECOMMENDATIONS
RN followed heparin nomogram protocol. Heparin drip stopped for one hour according to heparin nomogram.

## 2022-02-05 LAB
ALBUMIN SERPL ELPH-MCNC: 2.7 G/DL — LOW (ref 3.3–5.2)
ALP SERPL-CCNC: 93 U/L — SIGNIFICANT CHANGE UP (ref 40–120)
ALT FLD-CCNC: 31 U/L — SIGNIFICANT CHANGE UP
ANION GAP SERPL CALC-SCNC: 14 MMOL/L — SIGNIFICANT CHANGE UP (ref 5–17)
APTT BLD: 85 SEC — HIGH (ref 27.5–35.5)
AST SERPL-CCNC: 23 U/L — SIGNIFICANT CHANGE UP
BILIRUB SERPL-MCNC: 0.4 MG/DL — SIGNIFICANT CHANGE UP (ref 0.4–2)
BUN SERPL-MCNC: 16.5 MG/DL — SIGNIFICANT CHANGE UP (ref 8–20)
CALCIUM SERPL-MCNC: 8.4 MG/DL — LOW (ref 8.6–10.2)
CHLORIDE SERPL-SCNC: 108 MMOL/L — HIGH (ref 98–107)
CO2 SERPL-SCNC: 22 MMOL/L — SIGNIFICANT CHANGE UP (ref 22–29)
CREAT SERPL-MCNC: 0.61 MG/DL — SIGNIFICANT CHANGE UP (ref 0.5–1.3)
GLUCOSE SERPL-MCNC: 91 MG/DL — SIGNIFICANT CHANGE UP (ref 70–99)
HCT VFR BLD CALC: 33.7 % — LOW (ref 34.5–45)
HGB BLD-MCNC: 11 G/DL — LOW (ref 11.5–15.5)
MCHC RBC-ENTMCNC: 29 PG — SIGNIFICANT CHANGE UP (ref 27–34)
MCHC RBC-ENTMCNC: 32.6 GM/DL — SIGNIFICANT CHANGE UP (ref 32–36)
MCV RBC AUTO: 88.9 FL — SIGNIFICANT CHANGE UP (ref 80–100)
PLATELET # BLD AUTO: 339 K/UL — SIGNIFICANT CHANGE UP (ref 150–400)
POTASSIUM SERPL-MCNC: 3.7 MMOL/L — SIGNIFICANT CHANGE UP (ref 3.5–5.3)
POTASSIUM SERPL-SCNC: 3.7 MMOL/L — SIGNIFICANT CHANGE UP (ref 3.5–5.3)
PROT SERPL-MCNC: 5.5 G/DL — LOW (ref 6.6–8.7)
RBC # BLD: 3.79 M/UL — LOW (ref 3.8–5.2)
RBC # FLD: 16 % — HIGH (ref 10.3–14.5)
SODIUM SERPL-SCNC: 144 MMOL/L — SIGNIFICANT CHANGE UP (ref 135–145)
WBC # BLD: 13.99 K/UL — HIGH (ref 3.8–10.5)
WBC # FLD AUTO: 13.99 K/UL — HIGH (ref 3.8–10.5)

## 2022-02-05 PROCEDURE — 99232 SBSQ HOSP IP/OBS MODERATE 35: CPT

## 2022-02-05 RX ORDER — MAGNESIUM HYDROXIDE 400 MG/1
30 TABLET, CHEWABLE ORAL DAILY
Refills: 0 | Status: DISCONTINUED | OUTPATIENT
Start: 2022-02-05 | End: 2022-02-09

## 2022-02-05 RX ADMIN — Medication 88 MICROGRAM(S): at 06:11

## 2022-02-05 RX ADMIN — Medication 25 MILLIGRAM(S): at 18:09

## 2022-02-05 RX ADMIN — SENNA PLUS 2 TABLET(S): 8.6 TABLET ORAL at 22:11

## 2022-02-05 RX ADMIN — Medication 25 MILLIGRAM(S): at 06:12

## 2022-02-05 RX ADMIN — Medication 15 MILLIGRAM(S): at 11:56

## 2022-02-05 RX ADMIN — Medication 3 MILLIGRAM(S): at 22:11

## 2022-02-05 RX ADMIN — Medication 20 MILLIGRAM(S): at 06:11

## 2022-02-05 RX ADMIN — POLYETHYLENE GLYCOL 3350 17 GRAM(S): 17 POWDER, FOR SOLUTION ORAL at 11:55

## 2022-02-06 LAB
APTT BLD: 68.5 SEC — HIGH (ref 27.5–35.5)
HCT VFR BLD CALC: 36.2 % — SIGNIFICANT CHANGE UP (ref 34.5–45)
HGB BLD-MCNC: 11.7 G/DL — SIGNIFICANT CHANGE UP (ref 11.5–15.5)
MCHC RBC-ENTMCNC: 29.3 PG — SIGNIFICANT CHANGE UP (ref 27–34)
MCHC RBC-ENTMCNC: 32.3 GM/DL — SIGNIFICANT CHANGE UP (ref 32–36)
MCV RBC AUTO: 90.7 FL — SIGNIFICANT CHANGE UP (ref 80–100)
PLATELET # BLD AUTO: 366 K/UL — SIGNIFICANT CHANGE UP (ref 150–400)
RBC # BLD: 3.99 M/UL — SIGNIFICANT CHANGE UP (ref 3.8–5.2)
RBC # FLD: 16 % — HIGH (ref 10.3–14.5)
WBC # BLD: 9.19 K/UL — SIGNIFICANT CHANGE UP (ref 3.8–10.5)
WBC # FLD AUTO: 9.19 K/UL — SIGNIFICANT CHANGE UP (ref 3.8–10.5)

## 2022-02-06 PROCEDURE — 99233 SBSQ HOSP IP/OBS HIGH 50: CPT

## 2022-02-06 RX ORDER — HYDROCORTISONE 20 MG
20 TABLET ORAL
Refills: 0 | Status: DISCONTINUED | OUTPATIENT
Start: 2022-02-06 | End: 2022-02-09

## 2022-02-06 RX ORDER — HYDROCORTISONE 20 MG
15 TABLET ORAL
Refills: 0 | Status: DISCONTINUED | OUTPATIENT
Start: 2022-02-06 | End: 2022-02-09

## 2022-02-06 RX ORDER — ATORVASTATIN CALCIUM 80 MG/1
10 TABLET, FILM COATED ORAL AT BEDTIME
Refills: 0 | Status: DISCONTINUED | OUTPATIENT
Start: 2022-02-06 | End: 2022-02-09

## 2022-02-06 RX ORDER — POTASSIUM CHLORIDE 20 MEQ
20 PACKET (EA) ORAL
Refills: 0 | Status: COMPLETED | OUTPATIENT
Start: 2022-02-06 | End: 2022-02-06

## 2022-02-06 RX ORDER — FUROSEMIDE 40 MG
20 TABLET ORAL ONCE
Refills: 0 | Status: COMPLETED | OUTPATIENT
Start: 2022-02-06 | End: 2022-02-06

## 2022-02-06 RX ADMIN — Medication 15 MILLIGRAM(S): at 14:40

## 2022-02-06 RX ADMIN — Medication 25 MILLIGRAM(S): at 05:12

## 2022-02-06 RX ADMIN — Medication 20 MILLIGRAM(S): at 11:51

## 2022-02-06 RX ADMIN — Medication 25 MILLIGRAM(S): at 18:18

## 2022-02-06 RX ADMIN — Medication 3 MILLIGRAM(S): at 21:31

## 2022-02-06 RX ADMIN — Medication 20 MILLIEQUIVALENT(S): at 11:51

## 2022-02-06 RX ADMIN — HEPARIN SODIUM 1300 UNIT(S)/HR: 5000 INJECTION INTRAVENOUS; SUBCUTANEOUS at 05:54

## 2022-02-06 RX ADMIN — ATORVASTATIN CALCIUM 10 MILLIGRAM(S): 80 TABLET, FILM COATED ORAL at 21:28

## 2022-02-06 RX ADMIN — POLYETHYLENE GLYCOL 3350 17 GRAM(S): 17 POWDER, FOR SOLUTION ORAL at 11:46

## 2022-02-06 RX ADMIN — Medication 20 MILLIGRAM(S): at 05:11

## 2022-02-06 RX ADMIN — Medication 88 MICROGRAM(S): at 05:18

## 2022-02-06 RX ADMIN — MAGNESIUM HYDROXIDE 30 MILLILITER(S): 400 TABLET, CHEWABLE ORAL at 11:46

## 2022-02-06 RX ADMIN — Medication 20 MILLIEQUIVALENT(S): at 14:40

## 2022-02-06 NOTE — DIETITIAN INITIAL EVALUATION ADULT. - PERTINENT MEDS FT
MEDICATIONS  (STANDING):  atorvastatin 10 milliGRAM(s) Oral at bedtime  heparin  Infusion.  Unit(s)/Hr (18 mL/Hr) IV Continuous <Continuous>  hydrocortisone 20 milliGRAM(s) Oral <User Schedule>  hydrocortisone 15 milliGRAM(s) Oral <User Schedule>  levothyroxine 88 MICROGram(s) Oral daily  lidocaine   4% Patch 1 Patch Transdermal every 24 hours  lidocaine   4% Patch 1 Patch Transdermal every 24 hours  metoprolol succinate ER 25 milliGRAM(s) Oral every 12 hours  polyethylene glycol 3350 17 Gram(s) Oral daily  potassium chloride    Tablet ER 20 milliEquivalent(s) Oral every 2 hours  senna 2 Tablet(s) Oral at bedtime    MEDICATIONS  (PRN):  heparin   Injectable 8500 Unit(s) IV Push every 6 hours PRN For aPTT less than 40  heparin   Injectable 4000 Unit(s) IV Push every 6 hours PRN For aPTT between 40 - 57  magnesium hydroxide Suspension 30 milliLiter(s) Oral daily PRN Constipation  melatonin. 3 milliGRAM(s) Oral at bedtime PRN Insomnia  ondansetron Injectable 4 milliGRAM(s) IV Push every 6 hours PRN Nausea and/or Vomiting

## 2022-02-06 NOTE — DIETITIAN INITIAL EVALUATION ADULT. - PERTINENT LABORATORY DATA
02-05 Na144 mmol/L Glu 91 mg/dL K+ 3.7 mmol/L Cr  0.61 mg/dL BUN 16.5 mg/dL Phos n/a   Alb 2.7 g/dL<L> PAB n/a

## 2022-02-06 NOTE — DIETITIAN INITIAL EVALUATION ADULT. - ORAL INTAKE PTA/DIET HISTORY
Pt reports fair to good PO intake.   UBW 160lbs, with 30lb weight gain in the last year after her sx, sedentary status and excessive kcal intake during pandemic lockdown. - believes current weight to be 190  per chart weight 226lbs

## 2022-02-06 NOTE — DIETITIAN INITIAL EVALUATION ADULT. - OTHER INFO
pt. 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 Brown), hx of hypothyroidism, HTN presents with right pleuritic chest pain with inhalation, lower extremity swelling with pain L > R, . pt. was discharged to a rehab yesterday after she was admitted at our facility for hypotensio, georgi, constipation. pt. was on subcut heparin for dvt prophylaxis. Patient's rehab physician was concerned about pulmonary embolism, patient was sent in for further evaluation. Patient denies active chest pain unless she takes deep breaths, denies palpitations, difficulty breathing, denies nausea/vomiting. no abd. pain.    HBV protein foods encouraged, portion control discussed when pt reported desire for weight loss          *Pulmonary embolism:

## 2022-02-07 LAB
ANION GAP SERPL CALC-SCNC: 12 MMOL/L — SIGNIFICANT CHANGE UP (ref 5–17)
APTT BLD: 93.5 SEC — HIGH (ref 27.5–35.5)
BUN SERPL-MCNC: 9.8 MG/DL — SIGNIFICANT CHANGE UP (ref 8–20)
CALCIUM SERPL-MCNC: 8.9 MG/DL — SIGNIFICANT CHANGE UP (ref 8.6–10.2)
CHLORIDE SERPL-SCNC: 108 MMOL/L — HIGH (ref 98–107)
CO2 SERPL-SCNC: 23 MMOL/L — SIGNIFICANT CHANGE UP (ref 22–29)
CREAT SERPL-MCNC: 0.53 MG/DL — SIGNIFICANT CHANGE UP (ref 0.5–1.3)
GLUCOSE SERPL-MCNC: 83 MG/DL — SIGNIFICANT CHANGE UP (ref 70–99)
HCT VFR BLD CALC: 35.8 % — SIGNIFICANT CHANGE UP (ref 34.5–45)
HGB BLD-MCNC: 11.6 G/DL — SIGNIFICANT CHANGE UP (ref 11.5–15.5)
MAGNESIUM SERPL-MCNC: 2.1 MG/DL — SIGNIFICANT CHANGE UP (ref 1.6–2.6)
MCHC RBC-ENTMCNC: 29.2 PG — SIGNIFICANT CHANGE UP (ref 27–34)
MCHC RBC-ENTMCNC: 32.4 GM/DL — SIGNIFICANT CHANGE UP (ref 32–36)
MCV RBC AUTO: 90.2 FL — SIGNIFICANT CHANGE UP (ref 80–100)
PLATELET # BLD AUTO: 381 K/UL — SIGNIFICANT CHANGE UP (ref 150–400)
POTASSIUM SERPL-MCNC: 4.1 MMOL/L — SIGNIFICANT CHANGE UP (ref 3.5–5.3)
POTASSIUM SERPL-SCNC: 4.1 MMOL/L — SIGNIFICANT CHANGE UP (ref 3.5–5.3)
RBC # BLD: 3.97 M/UL — SIGNIFICANT CHANGE UP (ref 3.8–5.2)
RBC # FLD: 16.2 % — HIGH (ref 10.3–14.5)
SODIUM SERPL-SCNC: 143 MMOL/L — SIGNIFICANT CHANGE UP (ref 135–145)
WBC # BLD: 10.5 K/UL — SIGNIFICANT CHANGE UP (ref 3.8–10.5)
WBC # FLD AUTO: 10.5 K/UL — SIGNIFICANT CHANGE UP (ref 3.8–10.5)

## 2022-02-07 PROCEDURE — 99233 SBSQ HOSP IP/OBS HIGH 50: CPT

## 2022-02-07 RX ORDER — FUROSEMIDE 40 MG
40 TABLET ORAL DAILY
Refills: 0 | Status: DISCONTINUED | OUTPATIENT
Start: 2022-02-07 | End: 2022-02-08

## 2022-02-07 RX ORDER — APIXABAN 2.5 MG/1
10 TABLET, FILM COATED ORAL EVERY 12 HOURS
Refills: 0 | Status: DISCONTINUED | OUTPATIENT
Start: 2022-02-07 | End: 2022-02-09

## 2022-02-07 RX ADMIN — APIXABAN 10 MILLIGRAM(S): 2.5 TABLET, FILM COATED ORAL at 21:44

## 2022-02-07 RX ADMIN — Medication 25 MILLIGRAM(S): at 05:56

## 2022-02-07 RX ADMIN — Medication 88 MICROGRAM(S): at 05:58

## 2022-02-07 RX ADMIN — Medication 20 MILLIGRAM(S): at 08:47

## 2022-02-07 RX ADMIN — Medication 15 MILLIGRAM(S): at 14:06

## 2022-02-07 RX ADMIN — Medication 3 MILLIGRAM(S): at 21:44

## 2022-02-07 RX ADMIN — Medication 40 MILLIGRAM(S): at 12:06

## 2022-02-07 RX ADMIN — ATORVASTATIN CALCIUM 10 MILLIGRAM(S): 80 TABLET, FILM COATED ORAL at 21:44

## 2022-02-07 NOTE — PHYSICAL THERAPY INITIAL EVALUATION ADULT - LIVES WITH, PROFILE
CLEAR LIQUID DIET    FOOD GROUP FOODS ALLOWED FOODS TO AVOID        Milk and beverages Tea (decaf or regular), Milk, milk drinks   No purple, orange or red liquids! carbonated beverages(Sprite, 7Up, Ginger Ale) Gatorade         Meat and meat substitutes None All        Vegetables None All        Fruits and fruit juices Strained fruit juices; apple,  Fruit juices with unstrained    white grape, lemonade fruit (pulp)        Grains and starches None All        Soups Clear broth, consomme All others        Desserts Gelatin All others    Popsicles - no red, orange, blue or purple flavors         Fats None All        Miscellaneous Sugar, honey, syrup, clear hard candy, salt    All others        MEAL SUGGESTIONS:          BREAKFAST LUNCH DINNER        4 oz white grape juice 4 oz apple juice 4 oz lemonade   6 oz clear broth 6 oz clear broth 6 oz clear broth   Jell-O* Jell-O* Jell-O*   Tea Tea Tea        *Plain only, no fruit or toppings            No Alcoholic Beverages   spouse

## 2022-02-07 NOTE — PHYSICAL THERAPY INITIAL EVALUATION ADULT - ADDITIONAL COMMENTS
per patient she was previously independent without an AD however went to a rehab facility post procedure and was not there long enough to participate in therapy. Pt lives with her , reports having multiple steps to enter with a handrail - does not state exact number.

## 2022-02-08 LAB
ANION GAP SERPL CALC-SCNC: 12 MMOL/L — SIGNIFICANT CHANGE UP (ref 5–17)
BUN SERPL-MCNC: 11.2 MG/DL — SIGNIFICANT CHANGE UP (ref 8–20)
CALCIUM SERPL-MCNC: 8.8 MG/DL — SIGNIFICANT CHANGE UP (ref 8.6–10.2)
CHLORIDE SERPL-SCNC: 105 MMOL/L — SIGNIFICANT CHANGE UP (ref 98–107)
CK SERPL-CCNC: 20 U/L — LOW (ref 25–170)
CO2 SERPL-SCNC: 23 MMOL/L — SIGNIFICANT CHANGE UP (ref 22–29)
CREAT SERPL-MCNC: 0.5 MG/DL — SIGNIFICANT CHANGE UP (ref 0.5–1.3)
CULTURE RESULTS: SIGNIFICANT CHANGE UP
CULTURE RESULTS: SIGNIFICANT CHANGE UP
GLUCOSE SERPL-MCNC: 86 MG/DL — SIGNIFICANT CHANGE UP (ref 70–99)
HCT VFR BLD CALC: 36.9 % — SIGNIFICANT CHANGE UP (ref 34.5–45)
HGB BLD-MCNC: 12.1 G/DL — SIGNIFICANT CHANGE UP (ref 11.5–15.5)
MAGNESIUM SERPL-MCNC: 2.2 MG/DL — SIGNIFICANT CHANGE UP (ref 1.6–2.6)
MCHC RBC-ENTMCNC: 29.4 PG — SIGNIFICANT CHANGE UP (ref 27–34)
MCHC RBC-ENTMCNC: 32.8 GM/DL — SIGNIFICANT CHANGE UP (ref 32–36)
MCV RBC AUTO: 89.8 FL — SIGNIFICANT CHANGE UP (ref 80–100)
NT-PROBNP SERPL-SCNC: 777 PG/ML — HIGH (ref 0–300)
PHOSPHATE SERPL-MCNC: 3.5 MG/DL — SIGNIFICANT CHANGE UP (ref 2.4–4.7)
PLATELET # BLD AUTO: 392 K/UL — SIGNIFICANT CHANGE UP (ref 150–400)
POTASSIUM SERPL-MCNC: 3.8 MMOL/L — SIGNIFICANT CHANGE UP (ref 3.5–5.3)
POTASSIUM SERPL-SCNC: 3.8 MMOL/L — SIGNIFICANT CHANGE UP (ref 3.5–5.3)
RBC # BLD: 4.11 M/UL — SIGNIFICANT CHANGE UP (ref 3.8–5.2)
RBC # FLD: 16.2 % — HIGH (ref 10.3–14.5)
SARS-COV-2 RNA SPEC QL NAA+PROBE: SIGNIFICANT CHANGE UP
SODIUM SERPL-SCNC: 140 MMOL/L — SIGNIFICANT CHANGE UP (ref 135–145)
SPECIMEN SOURCE: SIGNIFICANT CHANGE UP
SPECIMEN SOURCE: SIGNIFICANT CHANGE UP
WBC # BLD: 11.36 K/UL — HIGH (ref 3.8–10.5)
WBC # FLD AUTO: 11.36 K/UL — HIGH (ref 3.8–10.5)

## 2022-02-08 PROCEDURE — 99232 SBSQ HOSP IP/OBS MODERATE 35: CPT

## 2022-02-08 PROCEDURE — 72190 X-RAY EXAM OF PELVIS: CPT | Mod: 26

## 2022-02-08 RX ORDER — OXYCODONE HYDROCHLORIDE 5 MG/1
5 TABLET ORAL
Refills: 0 | Status: DISCONTINUED | OUTPATIENT
Start: 2022-02-08 | End: 2022-02-09

## 2022-02-08 RX ORDER — FUROSEMIDE 40 MG
40 TABLET ORAL
Refills: 0 | Status: DISCONTINUED | OUTPATIENT
Start: 2022-02-08 | End: 2022-02-09

## 2022-02-08 RX ADMIN — APIXABAN 10 MILLIGRAM(S): 2.5 TABLET, FILM COATED ORAL at 05:35

## 2022-02-08 RX ADMIN — POLYETHYLENE GLYCOL 3350 17 GRAM(S): 17 POWDER, FOR SOLUTION ORAL at 12:56

## 2022-02-08 RX ADMIN — Medication 88 MICROGRAM(S): at 05:36

## 2022-02-08 RX ADMIN — SENNA PLUS 2 TABLET(S): 8.6 TABLET ORAL at 22:23

## 2022-02-08 RX ADMIN — APIXABAN 10 MILLIGRAM(S): 2.5 TABLET, FILM COATED ORAL at 18:53

## 2022-02-08 RX ADMIN — Medication 25 MILLIGRAM(S): at 05:35

## 2022-02-08 RX ADMIN — Medication 25 MILLIGRAM(S): at 18:53

## 2022-02-08 RX ADMIN — OXYCODONE HYDROCHLORIDE 5 MILLIGRAM(S): 5 TABLET ORAL at 09:59

## 2022-02-08 RX ADMIN — Medication 20 MILLIGRAM(S): at 09:23

## 2022-02-08 RX ADMIN — OXYCODONE HYDROCHLORIDE 5 MILLIGRAM(S): 5 TABLET ORAL at 11:00

## 2022-02-08 RX ADMIN — Medication 40 MILLIGRAM(S): at 18:53

## 2022-02-08 RX ADMIN — ATORVASTATIN CALCIUM 10 MILLIGRAM(S): 80 TABLET, FILM COATED ORAL at 22:23

## 2022-02-08 RX ADMIN — Medication 40 MILLIGRAM(S): at 09:23

## 2022-02-08 RX ADMIN — Medication 15 MILLIGRAM(S): at 15:15

## 2022-02-08 RX ADMIN — Medication 40 MILLIGRAM(S): at 05:36

## 2022-02-08 NOTE — PROGRESS NOTE ADULT - SUBJECTIVE AND OBJECTIVE BOX
Patient seen and examined . c/o mild sob , r sided back pain with deep breaths , LE weakness since surgery ,   urinary incontinency, had small BM yesterday after 5 days      CC : as above   No overnight events      MEDICATIONS  (STANDING):  heparin  Infusion.  Unit(s)/Hr (18 mL/Hr) IV Continuous <Continuous>  hydrocortisone 20 milliGRAM(s) Oral daily  hydrocortisone 15 milliGRAM(s) Oral daily  levothyroxine 88 MICROGram(s) Oral daily  lidocaine   4% Patch 1 Patch Transdermal every 24 hours  lidocaine   4% Patch 1 Patch Transdermal every 24 hours  metoprolol succinate ER 25 milliGRAM(s) Oral every 12 hours  polyethylene glycol 3350 17 Gram(s) Oral daily  senna 2 Tablet(s) Oral at bedtime    MEDICATIONS  (PRN):  heparin   Injectable 8500 Unit(s) IV Push every 6 hours PRN For aPTT less than 40  heparin   Injectable 4000 Unit(s) IV Push every 6 hours PRN For aPTT between 40 - 57  magnesium hydroxide Suspension 30 milliLiter(s) Oral daily PRN Constipation  melatonin. 3 milliGRAM(s) Oral at bedtime PRN Insomnia  ondansetron Injectable 4 milliGRAM(s) IV Push every 6 hours PRN Nausea and/or Vomiting      LABS:                          11.7   9.19  )-----------( 366      ( 06 Feb 2022 05:26 )             36.2     02-05    144  |  108<H>  |  16.5  ----------------------------<  91  3.7   |  22.0  |  0.61    Ca    8.4<L>      05 Feb 2022 06:50    TPro  5.5<L>  /  Alb  2.7<L>  /  TBili  0.4  /  DBili  x   /  AST  23  /  ALT  31  /  AlkPhos  93  02-05    PTT - ( 06 Feb 2022 05:26 )  PTT:68.5 sec    Serum Pro-Brain Natriuretic Peptide (02.03.22 @ 12:35)    Serum Pro-Brain Natriuretic Peptide: 948 pg/mL          RADIOLOGY & ADDITIONAL TESTS:    < from: Xray Chest 1 View- PORTABLE-Urgent (02.03.22 @ 12:48) >    ACC: [id number] EXAM:  XR CHEST PORTABLE URGENT 1V                          PROCEDURE DATE:  02/03/2022          INTERPRETATION:  AP chest on February 3, 2022 at 12:29 PM. Patient has   chest pain.    Heart normal for projection.    There are persistent slight linear densities at left base.    Chest is similar to January 10.    IMPRESSION: Persistent slight linear densities left base.    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 Brown), hx of hypothyroidism, HTN  presents with right pleuritic chest pain with inhalation, lower extremity   swelling with pain L > R, low grade temp this morning in setting of not   walking very much over past 30 days 2/2 being in hospital and being at   rehab facility.  TECHNIQUE: CT pulmonary angiogram of the chest . Uneventful   administration of 60 cc nonionic intravenous Omnipaque 350. Coronal,   sagittal and 3-D/MIP images were reconstructed/reviewed.  COMPARISON: No prior chest CT.    FINDINGS: The quality of the images are degraded by motion. Poor   opacification of the pulmonary arteries.    PULMONARY VESSELS: Emboli within bilateral lower lobe pulmonary arteries.   Main pulmonary artery normal in diameter.    HEART AND VASCULATURE: Heart size is normal. No CT evidence of right   heart strain. No pericardial effusion. No aortic aneurysm or dissection.   Coronary and aortic calcifications. Moderate stenosis of the proximal   left subclavian artery.    LUNGS, AIRWAYS, PLEURA: Patent central airways. Mild emphysema. Linear   scarring within right apex. Mild bibasilar subsegmental atelectasis.   Small right pleural effusion. No pneumothorax.    MEDIASTINUM: No mass or lymphadenopathy.    UPPER ABDOMEN: Within normal limits.    BONES AND SOFT TISSUES: No aggressive osseous lesion.    LOWER NECK: Within normal limits.    IMPRESSION:    Emboli within bilateral lower lobe pulmonary arteries. No CT evidence of   right heart strain.    Small right pleural effusion.    These findings were discussed with Dr. Sellers, on 2/3/2022 7:47 PM   with read back.    --- End of Report ---    < end of copied text >    < from: US Duplex Venous Lower Ext Complete, Bilateral (02.03.22 @ 17:30) >    ACC: [id number] EXAM:  US DPLX LWR EXT VEINS COMPL BI                          PROCEDURE DATE:  02/03/2022          INTERPRETATION:  CLINICAL INFORMATION: Lower extremity swelling and pain    COMPARISON: 1/28/2022    TECHNIQUE: Duplex sonography of the BILATERAL LOWER extremity veins with   color and spectral Doppler, with and without compression. Study is   limited as compression could not be performed secondary to patient's pain   tolerance    FINDINGS:    RIGHT:  Normal color flow without internal echogenicity of the RIGHT common   femoral, femoral and popliteal veins.  Doppler examination shows normal spontaneous and phasic flow.  No RIGHT calf vein thrombosis is detected.    LEFT:  Normal color flow without internal echogenicity of the LEFT common   femoral, femoral and popliteal veins.  Doppler examination shows normal spontaneous and phasic flow.  No LEFT calf vein thrombosis is detected.    IMPRESSION:  No evidence of deep venous thrombosis in either lower extremity within   the limits of the study.    < end of copied text >        < from: 12 Lead ECG (02.03.22 @ 11:06) >    Ventricular Rate 104 BPM    Atrial Rate 104 BPM    P-R Interval 144 ms    QRS Duration 110 ms    Q-T Interval 338 ms    QTC Calculation(Bazett) 444 ms    P Axis 65 degrees    R Axis -25 degrees    T Axis 80 degrees    Diagnosis Line Sinus tachycardia  Possible Left atrial enlargement  Incomplete left bundle branch block  Left ventricular hypertrophy with repolarization abnormality  Abnormal ECG    < end of copied text >      < from: TTE Echo Complete w/o Contrast w/ Doppler (02.04.22 @ 15:46) >    EXAM:  ECHO TTE WO CON COMP W DOPP      PROCEDURE DATE:  Feb 4 2022   .      INTERPRETATION:  TRANSTHORACIC ECHOCARDIOGRAM REPORT    < end of copied text >  < from: TTE Echo Complete w/o Contrast w/ Doppler (02.04.22 @ 15:46) >    Summary:   1. Left ventricular ejection fraction, by visual estimation, is 70 to   75%.   2. Normal global left ventricular systolic function.   3. Mildly increased LV wall thickness.   4. Spectral Doppler shows impaired relaxation pattern of left   ventricular myocardial filling (Grade I diastolic dysfunction).   5. Normal left atrial size.   6. Normal right atrial size.   7. There is no evidence of pericardial effusion.   8. Mild mitral annular calcification.   9. Trace mitral valve regurgitation.    MD Xavier Electronically signed on 2/4/2022 at 5:08:57 PM    < end of copied text >        REVIEW OF SYSTEMS:    As above , all other systems are reviewed and are negative     Vital Signs Last 24 Hrs  T(C): 36.7 (06 Feb 2022 04:21), Max: 36.9 (05 Feb 2022 12:38)  T(F): 98.1 (06 Feb 2022 04:21), Max: 98.4 (05 Feb 2022 12:38)  HR: 75 (06 Feb 2022 04:21) (75 - 103)  BP: 135/77 (06 Feb 2022 04:21) (128/73 - 170/84)  BP(mean): --  RR: 18 (06 Feb 2022 04:21) (18 - 18)  SpO2: 94% (06 Feb 2022 04:21) (93% - 94%)    PHYSICAL EXAM:    GENERAL: NAD, well-groomed, well-developed  HEAD:  Atraumatic, Normocephalic  EYES: EOMI, PERRLA, conjunctiva and sclera clear  NECK: Supple, No JVD, Normal thyroid  NERVOUS SYSTEM:  Alert & Oriented X3, no focal deficit  CHEST/LUNG: CTA b/l ,  no  rales, rhonchi, wheezing, or rubs  HEART: Regular rate and rhythm; No murmurs, rubs, or gallops  ABDOMEN: Soft, Nontender, Nondistended; Bowel sounds present  EXTREMITIES:  2+ Peripheral Pulses, No clubbing, cyanosis,   B/L LE edema / tenderness +   LYMPH: No lymphadenopathy noted  SKIN: No rashes or lesions  
SONAM JENSEN    [id number]    71y      Female    Patient is a 71y old  Female who presents with a chief complaint of PE (2022 11:25)      INTERVAL HPI/OVERNIGHT EVENTS:    Patient is feeling generalized weakness, she has not been active, denies fever, chills, chest pain, has b/l leg pain     REVIEW OF SYSTEMS:    CONSTITUTIONAL: No fever, fatigue  RESPIRATORY: No cough, No shortness of breath  CARDIOVASCULAR: No chest pain, palpitations  GASTROINTESTINAL: No abdominal, No nausea, vomiting  NEUROLOGICAL: No headaches,  loss of strength.  MISCELLANEOUS: b/l leg pain       Vital Signs Last 24 Hrs  T(C): 36.9 (2022 04:33), Max: 37.2 (2022 22:04)  T(F): 98.5 (2022 04:33), Max: 98.9 (2022 22:04)  HR: 99 (2022 04:33) (81 - 101)  BP: 164/89 (2022 04:33) (107/63 - 164/89)  RR: 18 (2022 04:33) (17 - 18)  SpO2: 92% (2022 04:33) (92% - 96%)    PHYSICAL EXAM:    GENERAL: Elderly Obese female looking comfortable   HEENT: PERRL, +EOMI  NECK: soft, Supple, No JVD,   CHEST/LUNG: Clear to auscultate bilaterally; No wheezing  HEART: S1S2+, Regular rate and rhythm; No murmurs  ABDOMEN: Soft, Nontender, Nondistended; Bowel sounds present  EXTREMITIES:  1+ Peripheral Pulses, has edema and tenderness b/l   SKIN: No rashes or lesions  NEURO: AAOX3, no focal deficits, no motor r sensory loss  PSYCH: normal mood      LABS:                        11.0   13.99 )-----------( 339      ( 2022 06:50 )             33.7     02-05    144  |  108<H>  |  16.5  ----------------------------<  91  3.7   |  22.0  |  0.61    Ca    8.4<L>      2022 06:50    TPro  5.5<L>  /  Alb  2.7<L>  /  TBili  0.4  /  DBili  x   /  AST  23  /  ALT  31  /  AlkPhos  93  02-05    PT/INR - ( 2022 12:35 )   PT: 11.4 sec;   INR: 0.98 ratio         PTT - ( 2022 08:28 )  PTT:85.0 sec  Urinalysis Basic - ( 2022 12:37 )    Color: Yellow / Appearance: Clear / S.010 / pH: x  Gluc: x / Ketone: Negative  / Bili: Negative / Urobili: Negative mg/dL   Blood: x / Protein: Negative / Nitrite: Negative   Leuk Esterase: Moderate / RBC: x / WBC 3-5 /HPF   Sq Epi: x / Non Sq Epi: Occasional / Bacteria: Occasional          I&O's Summary    2022 07:01  -  2022 07:00  --------------------------------------------------------  IN: 0 mL / OUT: 300 mL / NET: -300 mL        MEDICATIONS  (STANDING):  heparin  Infusion.  Unit(s)/Hr (18 mL/Hr) IV Continuous <Continuous>  hydrocortisone 20 milliGRAM(s) Oral daily  hydrocortisone 15 milliGRAM(s) Oral daily  levothyroxine 88 MICROGram(s) Oral daily  lidocaine   4% Patch 1 Patch Transdermal every 24 hours  lidocaine   4% Patch 1 Patch Transdermal every 24 hours  metoprolol succinate ER 25 milliGRAM(s) Oral every 12 hours  polyethylene glycol 3350 17 Gram(s) Oral daily  senna 2 Tablet(s) Oral at bedtime    MEDICATIONS  (PRN):  heparin   Injectable 8500 Unit(s) IV Push every 6 hours PRN For aPTT less than 40  heparin   Injectable 4000 Unit(s) IV Push every 6 hours PRN For aPTT between 40 - 57  melatonin. 3 milliGRAM(s) Oral at bedtime PRN Insomnia  ondansetron Injectable 4 milliGRAM(s) IV Push every 6 hours PRN Nausea and/or Vomiting        
HPI   pt. 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 Brown), hx of hypothyroidism, HTN presents with right pleuritic chest pain with inhalation, lower extremity swelling with pain L > R, . pt. was discharged to a rehab yesterday after she was admitted at our facility for hypotensio, georgi, constipation. pt. was on subcut heparin for dvt prophylaxis. Patient's rehab physician was concerned about pulmonary embolism, patient was sent in for further evaluation. Patient denies active chest pain unless she takes deep breaths, denies palpitations, difficulty breathing, denies nausea/vomiting. no abd. pain.    Pt was seen and examined at bedside. Pt still have SOB when talking too much. Occationally Chest pressure noted, but not present currently     Vital Signs Last 24 Hrs  T(C): 36.8 (04 Feb 2022 04:36), Max: 37.5 (03 Feb 2022 16:12)  T(F): 98.3 (04 Feb 2022 04:36), Max: 99.5 (03 Feb 2022 16:12)  HR: 90 (04 Feb 2022 04:36) (88 - 111)  BP: 158/88 (04 Feb 2022 04:36) (153/85 - 174/89)  BP(mean): --  RR: 18 (04 Feb 2022 04:36) (18 - 18)  SpO2: 93% (04 Feb 2022 04:36) (93% - 96%)    I&O's Summary    03 Feb 2022 07:01  -  04 Feb 2022 07:00  --------------------------------------------------------  IN: 0 mL / OUT: 1400 mL / NET: -1400 mL        CAPILLARY BLOOD GLUCOSE          PHYSICAL EXAM:    Constitutional: NAD, awake and alert, well-developed  HEENT: PERR, EOMI, Normal Hearing, MMM  Neck: Soft and supple, No LAD, No JVD  Respiratory: Breath sounds are clear bilaterally, No wheezing, rales or rhonchi  Cardiovascular: S1 and S2, regular rate and rhythm, no Murmurs, gallops or rubs  Gastrointestinal: Bowel Sounds present, soft, nontender, nondistended, no guarding, no rebound  Extremities: No peripheral edema  Vascular: 2+ peripheral pulses  Neurological: A/O x 3, no focal deficits  Musculoskeletal: 5/5 strength b/l upper and lower extremities  Skin: No rashes    MEDICATIONS:  MEDICATIONS  (STANDING):  heparin  Infusion.  Unit(s)/Hr (18 mL/Hr) IV Continuous <Continuous>  hydrocortisone 20 milliGRAM(s) Oral daily  hydrocortisone 15 milliGRAM(s) Oral daily  levothyroxine 88 MICROGram(s) Oral daily  lidocaine   4% Patch 1 Patch Transdermal every 24 hours  lidocaine   4% Patch 1 Patch Transdermal every 24 hours  metoprolol succinate ER 25 milliGRAM(s) Oral every 12 hours  polyethylene glycol 3350 17 Gram(s) Oral daily  senna 2 Tablet(s) Oral at bedtime      LABS: All Labs Reviewed:                        12.4   15.59 )-----------( 288      ( 04 Feb 2022 06:43 )             38.7     02-04    136  |  102  |  7.2<L>  ----------------------------<  139<H>  3.8   |  20.0<L>  |  0.44<L>    Ca    8.6      04 Feb 2022 06:43    TPro  5.9<L>  /  Alb  2.8<L>  /  TBili  0.4  /  DBili  x   /  AST  44<H>  /  ALT  42<H>  /  AlkPhos  106  02-04    PT/INR - ( 03 Feb 2022 12:35 )   PT: 11.4 sec;   INR: 0.98 ratio         PTT - ( 04 Feb 2022 09:36 )  PTT:116.9 sec  CARDIAC MARKERS ( 04 Feb 2022 06:43 )  x     / x     / 22 U/L / x     / x      CARDIAC MARKERS ( 03 Feb 2022 12:35 )  x     / <0.01 ng/mL / x     / x     / x          Blood Culture:     RADIOLOGY/EKG:    DVT PPX:    ADVANCED DIRECTIVE:    DISPOSITION:
[facility] Division of Hospital Medicine Progress Note    CONTACT INFO:  Ephraim Chanel M.D.  [phone]    Patient is a 71y old  Female who presents with a chief complaint of PE (06 Feb 2022 13:32)      SUBJECTIVE / OVERNIGHT EVENTS: Had some b/l LE edema with some pain, about the same as before. Otherwise still feel a little unsteady on the feet when worked with PT.     Patient denies chest pain, SOB, abd pain, N/V, fever, chills, dysuria or any other complaints. All remainder ROS negative.     MEDICATIONS  (STANDING):  apixaban 10 milliGRAM(s) Oral every 12 hours  atorvastatin 10 milliGRAM(s) Oral at bedtime  furosemide    Tablet 40 milliGRAM(s) Oral daily  hydrocortisone 20 milliGRAM(s) Oral <User Schedule>  hydrocortisone 15 milliGRAM(s) Oral <User Schedule>  levothyroxine 88 MICROGram(s) Oral daily  lidocaine   4% Patch 1 Patch Transdermal every 24 hours  lidocaine   4% Patch 1 Patch Transdermal every 24 hours  metoprolol succinate ER 25 milliGRAM(s) Oral every 12 hours  polyethylene glycol 3350 17 Gram(s) Oral daily  senna 2 Tablet(s) Oral at bedtime    MEDICATIONS  (PRN):  magnesium hydroxide Suspension 30 milliLiter(s) Oral daily PRN Constipation  melatonin. 3 milliGRAM(s) Oral at bedtime PRN Insomnia  ondansetron Injectable 4 milliGRAM(s) IV Push every 6 hours PRN Nausea and/or Vomiting      I&O's Summary    06 Feb 2022 07:01  -  07 Feb 2022 07:00  --------------------------------------------------------  IN: 156 mL / OUT: 1550 mL / NET: -1394 mL        PHYSICAL EXAM:  Vital Signs Last 24 Hrs  T(C): 36.9 (07 Feb 2022 04:27), Max: 36.9 (07 Feb 2022 04:27)  T(F): 98.5 (07 Feb 2022 04:27), Max: 98.5 (07 Feb 2022 04:27)  HR: 92 (07 Feb 2022 04:27) (74 - 94)  BP: 157/92 (07 Feb 2022 04:27) (95/64 - 157/92)  BP(mean): --  RR: 18 (07 Feb 2022 04:27) (18 - 18)  SpO2: 94% (07 Feb 2022 04:27) (94% - 96%)    CONSTITUTIONAL: NAD, well-groomed, sitting up in chair  ENMT: Moist oral mucosa, no pharyngeal injection or exudates; normal dentition  RESPIRATORY: Normal respiratory effort; lungs are clear to auscultation bilaterally  CARDIOVASCULAR: Regular rate and rhythm, normal S1 and S2, no murmur/rub/gallop; 2+ pitting edema  ABDOMEN: Nontender to palpation, normoactive bowel sounds, no rebound/guarding;  MUSCLOSKELETAL:  Normal gait; no clubbing or cyanosis of digits; no joint swelling or tenderness to palpation  PSYCH: A+O x3; affect appropriate  NEUROLOGY: CN 2-12 are intact and symmetric; no gross sensory deficits;   SKIN: No rashes; no palpable lesions    LABS:                        11.6   10.50 )-----------( 381      ( 07 Feb 2022 05:46 )             35.8     02-07    143  |  108<H>  |  9.8  ----------------------------<  83  4.1   |  23.0  |  0.53    Ca    8.9      07 Feb 2022 05:46  Mg     2.1     02-07      PTT - ( 07 Feb 2022 05:46 )  PTT:93.5 sec          CAPILLARY BLOOD GLUCOSE          RADIOLOGY & ADDITIONAL TESTS:  Results Reviewed:   Imaging Personally Reviewed:  Electrocardiogram Personally Reviewed:
[facility] Division of Hospital Medicine Progress Note    CONTACT INFO:  Ephraim Chanel M.D.  [phone]    Patient is a 71y old  Female who presents with a chief complaint of PE (07 Feb 2022 11:44)      SUBJECTIVE / OVERNIGHT EVENTS: no event overnight. reports b/l LE pain with movement, especially in the inguinal crease area.    Patient denies chest pain, SOB, abd pain, N/V, fever, chills, dysuria or any other complaints. All remainder ROS negative.     MEDICATIONS  (STANDING):  apixaban 10 milliGRAM(s) Oral every 12 hours  atorvastatin 10 milliGRAM(s) Oral at bedtime  furosemide    Tablet 40 milliGRAM(s) Oral two times a day  hydrocortisone 20 milliGRAM(s) Oral <User Schedule>  hydrocortisone 15 milliGRAM(s) Oral <User Schedule>  levothyroxine 88 MICROGram(s) Oral daily  lidocaine   4% Patch 1 Patch Transdermal every 24 hours  lidocaine   4% Patch 1 Patch Transdermal every 24 hours  metoprolol succinate ER 25 milliGRAM(s) Oral every 12 hours  polyethylene glycol 3350 17 Gram(s) Oral daily  senna 2 Tablet(s) Oral at bedtime    MEDICATIONS  (PRN):  magnesium hydroxide Suspension 30 milliLiter(s) Oral daily PRN Constipation  melatonin. 3 milliGRAM(s) Oral at bedtime PRN Insomnia  ondansetron Injectable 4 milliGRAM(s) IV Push every 6 hours PRN Nausea and/or Vomiting  oxyCODONE    IR 5 milliGRAM(s) Oral four times a day PRN Moderate Pain (4 - 6) and Severe Pain (7-10)      I&O's Summary    07 Feb 2022 07:01  -  08 Feb 2022 07:00  --------------------------------------------------------  IN: 0 mL / OUT: 350 mL / NET: -350 mL    08 Feb 2022 07:01  -  08 Feb 2022 13:47  --------------------------------------------------------  IN: 0 mL / OUT: 600 mL / NET: -600 mL        PHYSICAL EXAM:  Vital Signs Last 24 Hrs  T(C): 36.7 (08 Feb 2022 10:05), Max: 36.9 (08 Feb 2022 04:48)  T(F): 98 (08 Feb 2022 10:05), Max: 98.4 (08 Feb 2022 04:48)  HR: 94 (08 Feb 2022 10:05) (67 - 100)  BP: 103/68 (08 Feb 2022 10:05) (90/57 - 156/81)  BP(mean): --  RR: 18 (08 Feb 2022 10:05) (18 - 19)  SpO2: 96% (08 Feb 2022 10:05) (94% - 100%)    CONSTITUTIONAL: NAD, well-groomed, in bed  ENMT: Moist oral mucosa, no pharyngeal injection or exudates; normal dentition  RESPIRATORY: Normal respiratory effort; lungs are clear to auscultation bilaterally  CARDIOVASCULAR: Regular rate and rhythm, normal S1 and S2, no murmur/rub/gallop; 2+ pitting edema, TTP of the b/l LLE  ABDOMEN: Nontender to palpation, normoactive bowel sounds, no rebound/guarding;  MUSCLOSKELETAL:  Normal gait; no clubbing or cyanosis of digits; no joint swelling or tenderness to palpation  PSYCH: A+O x3; affect appropriate  NEUROLOGY: CN 2-12 are intact and symmetric; no gross sensory deficits;   SKIN: No rashes; no palpable lesions      LABS:                        12.1   11.36 )-----------( 392      ( 08 Feb 2022 07:44 )             36.9     02-08    140  |  105  |  11.2  ----------------------------<  86  3.8   |  23.0  |  0.50    Ca    8.8      08 Feb 2022 07:44  Phos  3.5     02-08  Mg     2.2     02-08      PTT - ( 07 Feb 2022 05:46 )  PTT:93.5 sec  CARDIAC MARKERS ( 08 Feb 2022 07:44 )  x     / x     / 20 U/L / x     / x              CAPILLARY BLOOD GLUCOSE          RADIOLOGY & ADDITIONAL TESTS:  Results Reviewed:   Imaging Personally Reviewed:  Electrocardiogram Personally Reviewed:

## 2022-02-08 NOTE — PROGRESS NOTE ADULT - ASSESSMENT
pt. is a 72 y/o female  with Cushing's syndrome due to L adrenal adenoma s/p recent 1/3/22 L adrenalectomy (Dr. Brown), hx of hypothyroidism, HTN presents with right pleuritic chest pain with inhalation, lower extremity swelling with pain L > R, . pt. was discharged to a rehab yesterday after she was admitted at our facility for hypotensio, georgi, constipation. pt. was on subcut heparin for dvt prophylaxis. Patient's rehab physician was concerned about pulmonary embolism, patient was sent in for further evaluation. Patient denies active chest pain unless she takes deep breaths, denies palpitations, difficulty breathing, denies nausea/vomiting. no abd. pain.    *Pulmonary embolism.   possible pt's decreased mobility contributed to PE, no rt. heart strain on ct scan.  Cont Heparin gtt  pending Echo     *adrenalectomy   was discharged 2 days ago   Cont Hydrocortisone per endocrinology from discharge   will need to follow up out pt     *HTN   Cont toprol 25mg BID     *hypothyroidsm   Cont levothyroxine     *HLD   hold statin     *Bilateral lower ext pain   Hold statin for now   CK negative   Lidoderm patch   bilateral lower ext doppler neg for DVT       Pt  did not  for updates   
pt. 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 Brown), hx of hypothyroidism, HTN presents with right pleuritic chest pain with inhalation, admitted for PE.     #Bilateral lower ext swelling / tenderness since after surgery -   -U/S no dvt , BNP noted elevated 900, ECHO noted with grade I diastolic  dysfunction  -start Lasix 40mg QD  -Compression stocking   -will restart statin as CK not elevated   -Lidoderm patch   -outpatient cardiology f/u    #Pulmonary embolism:   -possible pt's decreased mobility contributed to PE, no rt. heart strain on ct scan.  -s/p Heparin gtt, changed to Eliquis on 2/7  -Echo done and is unremarkable,   -US doppler showed No evidence of deep venous thrombosis in either lower extremity within the limits of the study.  -Further workup for hypercoagulability as on outpatient ,   -patient advised to stay on anticoagulation 3-6 months , follow up with PMD   for further recommendations     #Adrenalectomy   #Adrenal insufficiency  -was discharged 3 days prior this admission   -Cont Hydrocortisone per endocrinology from discharge - ( 20 mg at 8 am and 15 mg at 2 pm )   -will need to follow up out pt with Dr Fong as recommended on previous admission     #HTN   -Cont toprol 25mg BID with holding parameters     #Hypothyroidsm   -Cont levothyroxine     #HLD   hold statin     Dispo plan - [facility] pending 
pt. 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 Brown), hx of hypothyroidism, HTN presents with right pleuritic chest pain with inhalation, admitted for PE.     #Bilateral lower ext swelling / tenderness since after surgery -   -U/S no dvt , BNP noted elevated 900, ECHO noted with grade I diastolic  dysfunction  -start Lasix 40mg BID  -Compression stocking   -will restart statin as CK not elevated and BNP down from before  -PRN oxycodone for moderate/severe pain  -check pelvic xray   -need aggressive PT    #Pulmonary embolism:   -possible pt's decreased mobility contributed to PE, no rt. heart strain on ct scan.  -s/p Heparin gtt, changed to Eliquis on 2/7  -Echo done and is unremarkable,   -US doppler showed No evidence of deep venous thrombosis in either lower extremity within the limits of the study.  -Further workup for hypercoagulability as on outpatient ,   -patient advised to stay on anticoagulation 3-6 months , follow up with PMD for further recommendations     #Adrenalectomy   #Adrenal insufficiency  -was discharged 3 days prior this admission   -Cont Hydrocortisone per endocrinology from discharge - ( 20 mg at 8 am and 15 mg at 2 pm )   -will need to follow up out pt with Dr Fong as recommended on previous admission     #HTN   -Cont toprol 25mg BID with holding parameters     #Hypothyroidsm   -Cont levothyroxine     #HLD   hold statin     Dispo plan - [facility] in 1-2 days once LLE pain and edema improves
pt. is a 72 y/o female  with Cushing's syndrome due to L adrenal adenoma s/p recent 1/3/22 L adrenalectomy (Dr. Brown), hx of hypothyroidism, HTN presents with right pleuritic chest pain with inhalation, lower extremity swelling with pain L > R, . pt. was discharged to a rehab yesterday after she was admitted at our facility for hypotensio, georgi, constipation. pt. was on subcut heparin for dvt prophylaxis. Patient's rehab physician was concerned about pulmonary embolism, patient was sent in for further evaluation. Patient denies active chest pain unless she takes deep breaths, denies palpitations, difficulty breathing, denies nausea/vomiting. no abd. pain.    *Pulmonary embolism:   possible pt's decreased mobility contributed to PE, no rt. heart strain on ct scan.  Cont Heparin gtt  Echo done and is unremarkable,   US doppler showed No evidence of deep venous thrombosis in either lower extremity within the limits of the study.  Further workup for hypercoagulability as on outpatient ,   patient advised to stay on anticoagulation 3-6 months , follow up with PMD   for further recommendations   Patient might need placement, PT consulted, would switch to Elquis upon discharge   O2 wean off, saturating well on RA at 94%   as per patient she has no Hx of bleeding, no Hx of fall.     *adrenalectomy   was discharged 3 days prior this admission   Cont Hydrocortisone per endocrinology from discharge   ( 20 mg at 8 am and 15 mg at 2 pm )   will need to follow up out pt with Dr Fong as recommended   on previous admission     *HTN   Cont toprol 25mg BID with holding parameters     *hypothyroidsm   Cont levothyroxine     *HLD   hold statin     *Bilateral lower ext swelling / tenderness since after surgery -   U/S no dvt , BNP noted elevated , ECHO noted with grade I diastolic  dysfunction - will give dose of Lasix , pt with hx of sulfa allergy -   will monitor , if no reaction will continue Lasix daily for few days and reassess    will restart statin as CK not elevated   CK negative   Lidoderm patch   bilateral lower ext doppler neg for DVT       Leucocytosis - resolved - no S/S of infection - likely reactive     Dispo plan - pending physical therapy eval and recommendations 
pt. 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 Brown), hx of hypothyroidism, HTN presents with right pleuritic chest pain with inhalation, lower extremity swelling with pain L > R, . pt. was discharged to a rehab yesterday after she was admitted at our facility for hypotensio, georgi, constipation. pt. was on subcut heparin for dvt prophylaxis. Patient's rehab physician was concerned about pulmonary embolism, patient was sent in for further evaluation. Patient denies active chest pain unless she takes deep breaths, denies palpitations, difficulty breathing, denies nausea/vomiting. no abd. pain.    *Pulmonary embolism:   possible pt's decreased mobility contributed to PE, no rt. heart strain on ct scan.  Cont Heparin gtt  Echo done and is unremarkable,   US doppler showed No evidence of deep venous thrombosis in either lower extremity within the limits of the study.  Further workup for hypercoagulability as out patient.   Patient might need placement, PT consulted, would switch to Elquis upon discharge  on O2 will wean off  as per patient she has no Hx of bleeding, no Hx of fall.     *adrenalectomy   was discharged 3 days ago   Cont Hydrocortisone per endocrinology from discharge   will need to follow up out pt.     *HTN   Cont toprol 25mg BID with holding parameters     *hypothyroidsm   Cont levothyroxine     *HLD   hold statin     *Bilateral lower ext pain   Hold statin for now   CK negative   Lidoderm patch   bilateral lower ext doppler neg for DVT       Pt's  updated.     likely going back to [facility].

## 2022-02-09 ENCOUNTER — TRANSCRIPTION ENCOUNTER (OUTPATIENT)
Age: 72
End: 2022-02-09

## 2022-02-09 VITALS
TEMPERATURE: 98 F | OXYGEN SATURATION: 92 % | HEART RATE: 85 BPM | SYSTOLIC BLOOD PRESSURE: 134 MMHG | RESPIRATION RATE: 18 BRPM | DIASTOLIC BLOOD PRESSURE: 75 MMHG

## 2022-02-09 LAB
ANION GAP SERPL CALC-SCNC: 15 MMOL/L — SIGNIFICANT CHANGE UP (ref 5–17)
BUN SERPL-MCNC: 12.1 MG/DL — SIGNIFICANT CHANGE UP (ref 8–20)
CALCIUM SERPL-MCNC: 8.8 MG/DL — SIGNIFICANT CHANGE UP (ref 8.6–10.2)
CHLORIDE SERPL-SCNC: 99 MMOL/L — SIGNIFICANT CHANGE UP (ref 98–107)
CO2 SERPL-SCNC: 25 MMOL/L — SIGNIFICANT CHANGE UP (ref 22–29)
CREAT SERPL-MCNC: 0.63 MG/DL — SIGNIFICANT CHANGE UP (ref 0.5–1.3)
GLUCOSE SERPL-MCNC: 78 MG/DL — SIGNIFICANT CHANGE UP (ref 70–99)
HCT VFR BLD CALC: 38 % — SIGNIFICANT CHANGE UP (ref 34.5–45)
HGB BLD-MCNC: 12.2 G/DL — SIGNIFICANT CHANGE UP (ref 11.5–15.5)
MAGNESIUM SERPL-MCNC: 2.2 MG/DL — SIGNIFICANT CHANGE UP (ref 1.6–2.6)
MCHC RBC-ENTMCNC: 29 PG — SIGNIFICANT CHANGE UP (ref 27–34)
MCHC RBC-ENTMCNC: 32.1 GM/DL — SIGNIFICANT CHANGE UP (ref 32–36)
MCV RBC AUTO: 90.3 FL — SIGNIFICANT CHANGE UP (ref 80–100)
PHOSPHATE SERPL-MCNC: 4.2 MG/DL — SIGNIFICANT CHANGE UP (ref 2.4–4.7)
PLATELET # BLD AUTO: 409 K/UL — HIGH (ref 150–400)
POTASSIUM SERPL-MCNC: 3.5 MMOL/L — SIGNIFICANT CHANGE UP (ref 3.5–5.3)
POTASSIUM SERPL-SCNC: 3.5 MMOL/L — SIGNIFICANT CHANGE UP (ref 3.5–5.3)
RBC # BLD: 4.21 M/UL — SIGNIFICANT CHANGE UP (ref 3.8–5.2)
RBC # FLD: 16.7 % — HIGH (ref 10.3–14.5)
SODIUM SERPL-SCNC: 139 MMOL/L — SIGNIFICANT CHANGE UP (ref 135–145)
WBC # BLD: 10.03 K/UL — SIGNIFICANT CHANGE UP (ref 3.8–10.5)
WBC # FLD AUTO: 10.03 K/UL — SIGNIFICANT CHANGE UP (ref 3.8–10.5)

## 2022-02-09 PROCEDURE — 85018 HEMOGLOBIN: CPT

## 2022-02-09 PROCEDURE — 71275 CT ANGIOGRAPHY CHEST: CPT | Mod: MA

## 2022-02-09 PROCEDURE — 96361 HYDRATE IV INFUSION ADD-ON: CPT

## 2022-02-09 PROCEDURE — 96375 TX/PRO/DX INJ NEW DRUG ADDON: CPT

## 2022-02-09 PROCEDURE — 85730 THROMBOPLASTIN TIME PARTIAL: CPT

## 2022-02-09 PROCEDURE — 83605 ASSAY OF LACTIC ACID: CPT

## 2022-02-09 PROCEDURE — 83880 ASSAY OF NATRIURETIC PEPTIDE: CPT

## 2022-02-09 PROCEDURE — 82330 ASSAY OF CALCIUM: CPT

## 2022-02-09 PROCEDURE — 82947 ASSAY GLUCOSE BLOOD QUANT: CPT

## 2022-02-09 PROCEDURE — 80048 BASIC METABOLIC PNL TOTAL CA: CPT

## 2022-02-09 PROCEDURE — U0003: CPT

## 2022-02-09 PROCEDURE — 85610 PROTHROMBIN TIME: CPT

## 2022-02-09 PROCEDURE — 84295 ASSAY OF SERUM SODIUM: CPT

## 2022-02-09 PROCEDURE — 86850 RBC ANTIBODY SCREEN: CPT

## 2022-02-09 PROCEDURE — 82435 ASSAY OF BLOOD CHLORIDE: CPT

## 2022-02-09 PROCEDURE — 84132 ASSAY OF SERUM POTASSIUM: CPT

## 2022-02-09 PROCEDURE — 71045 X-RAY EXAM CHEST 1 VIEW: CPT

## 2022-02-09 PROCEDURE — 84100 ASSAY OF PHOSPHORUS: CPT

## 2022-02-09 PROCEDURE — 96374 THER/PROPH/DIAG INJ IV PUSH: CPT

## 2022-02-09 PROCEDURE — 83735 ASSAY OF MAGNESIUM: CPT

## 2022-02-09 PROCEDURE — 85379 FIBRIN DEGRADATION QUANT: CPT

## 2022-02-09 PROCEDURE — 81001 URINALYSIS AUTO W/SCOPE: CPT

## 2022-02-09 PROCEDURE — 84484 ASSAY OF TROPONIN QUANT: CPT

## 2022-02-09 PROCEDURE — 99239 HOSP IP/OBS DSCHRG MGMT >30: CPT

## 2022-02-09 PROCEDURE — 87040 BLOOD CULTURE FOR BACTERIA: CPT

## 2022-02-09 PROCEDURE — 36415 COLL VENOUS BLD VENIPUNCTURE: CPT

## 2022-02-09 PROCEDURE — 86900 BLOOD TYPING SEROLOGIC ABO: CPT

## 2022-02-09 PROCEDURE — 85014 HEMATOCRIT: CPT

## 2022-02-09 PROCEDURE — 72190 X-RAY EXAM OF PELVIS: CPT

## 2022-02-09 PROCEDURE — 84134 ASSAY OF PREALBUMIN: CPT

## 2022-02-09 PROCEDURE — 93306 TTE W/DOPPLER COMPLETE: CPT

## 2022-02-09 PROCEDURE — 86901 BLOOD TYPING SEROLOGIC RH(D): CPT

## 2022-02-09 PROCEDURE — 80053 COMPREHEN METABOLIC PANEL: CPT

## 2022-02-09 PROCEDURE — U0005: CPT

## 2022-02-09 PROCEDURE — 93005 ELECTROCARDIOGRAM TRACING: CPT

## 2022-02-09 PROCEDURE — 82550 ASSAY OF CK (CPK): CPT

## 2022-02-09 PROCEDURE — 85025 COMPLETE CBC W/AUTO DIFF WBC: CPT

## 2022-02-09 PROCEDURE — 99285 EMERGENCY DEPT VISIT HI MDM: CPT

## 2022-02-09 PROCEDURE — 93970 EXTREMITY STUDY: CPT

## 2022-02-09 PROCEDURE — 85027 COMPLETE CBC AUTOMATED: CPT

## 2022-02-09 PROCEDURE — 82803 BLOOD GASES ANY COMBINATION: CPT

## 2022-02-09 RX ORDER — APIXABAN 2.5 MG/1
2 TABLET, FILM COATED ORAL
Qty: 0 | Refills: 0 | DISCHARGE
Start: 2022-02-09

## 2022-02-09 RX ORDER — FUROSEMIDE 40 MG
1 TABLET ORAL
Qty: 0 | Refills: 0 | DISCHARGE
Start: 2022-02-09

## 2022-02-09 RX ORDER — OXYCODONE HYDROCHLORIDE 5 MG/1
1 TABLET ORAL
Qty: 0 | Refills: 0 | DISCHARGE
Start: 2022-02-09

## 2022-02-09 RX ADMIN — Medication 88 MICROGRAM(S): at 05:53

## 2022-02-09 RX ADMIN — APIXABAN 10 MILLIGRAM(S): 2.5 TABLET, FILM COATED ORAL at 05:53

## 2022-02-09 RX ADMIN — Medication 20 MILLIGRAM(S): at 11:02

## 2022-02-09 RX ADMIN — Medication 25 MILLIGRAM(S): at 05:53

## 2022-02-09 RX ADMIN — Medication 40 MILLIGRAM(S): at 05:54

## 2022-02-09 NOTE — DISCHARGE NOTE PROVIDER - ATTENDING DISCHARGE PHYSICAL EXAMINATION:
VITALS:   T(C): 37.1 (02-09-22 @ 04:30), Max: 37.1 (02-09-22 @ 04:30)  HR: 72 (02-09-22 @ 04:30) (72 - 93)  BP: 152/78 (02-09-22 @ 04:30) (126/76 - 152/78)  RR: 18 (02-09-22 @ 04:30) (18 - 18)  SpO2: 95% (02-09-22 @ 04:30) (95% - 96%)    GENERAL: NAD, lying in bed comfortably  HEAD:  Atraumatic, Normocephalic  EYES: EOMI, PERRLA, conjunctiva and sclera clear  ENT: Moist mucous membranes  NECK: Supple, No JVD  CHEST/LUNG: Clear to auscultation bilaterally; No rales, rhonchi, wheezing, or rubs. Unlabored respirations  HEART: Regular rate and rhythm; No murmurs, rubs, or gallops  ABDOMEN: BSx4; Soft, nontender, nondistended  EXTREMITIES:  1+ LE edema, mildly better than yesterday   NERVOUS SYSTEM:  A&Ox3, no focal deficits   SKIN: No rashes or lesions  PSYCH: Normal affect, euthymic mood

## 2022-02-09 NOTE — DISCHARGE NOTE NURSING/CASE MANAGEMENT/SOCIAL WORK - PATIENT PORTAL LINK FT
You can access the FollowMyHealth Patient Portal offered by [facility] by registering at the following website: [email or web address]. By joining [employer]’s FollowMyHealth portal, you will also be able to view your health information using other applications (apps) compatible with our system.

## 2022-02-09 NOTE — DISCHARGE NOTE PROVIDER - CARE PROVIDER_API CALL
Bishop Ureña (DO)  Internal Medicine  [address], [address]  [place] [address]  Phone: [phone]  Fax: [phone]  Follow Up Time: Routine

## 2022-02-09 NOTE — DISCHARGE NOTE PROVIDER - NSDCCPCAREPLAN_GEN_ALL_CORE_FT
PRINCIPAL DISCHARGE DIAGNOSIS  Diagnosis: Pulmonary embolism  Assessment and Plan of Treatment:       SECONDARY DISCHARGE DIAGNOSES  Diagnosis: Lower extremity edema  Assessment and Plan of Treatment:      PRINCIPAL DISCHARGE DIAGNOSIS  Diagnosis: Acute respiratory failure with hypoxia  Assessment and Plan of Treatment:       SECONDARY DISCHARGE DIAGNOSES  Diagnosis: Lower extremity edema  Assessment and Plan of Treatment:     Diagnosis: Acute respiratory failure with hypoxia  Assessment and Plan of Treatment:      PRINCIPAL DISCHARGE DIAGNOSIS  Diagnosis: Acute pulmonary embolism  Assessment and Plan of Treatment:       SECONDARY DISCHARGE DIAGNOSES  Diagnosis: Lower extremity edema  Assessment and Plan of Treatment:     Diagnosis: Acute respiratory failure with hypoxia  Assessment and Plan of Treatment:

## 2022-02-09 NOTE — DISCHARGE NOTE PROVIDER - HOSPITAL COURSE
pt. 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 Brown), hx of hypothyroidism, HTN presents with right pleuritic chest pain with inhalation, lower extremity swelling with pain L > R, . pt. was discharged to a rehab yesterday after she was admitted at our facility for hypotension, georgi, constipation. pt. was on subcut heparin for dvt prophylaxis. Patient's rehab physician was concerned about pulmonary embolism, patient was sent in for further evaluation. Patient denies active chest pain unless she takes deep breaths, denies palpitations, difficulty breathing, denies nausea/vomiting. no abd. pain.  CTA confirmed pulmonary embolism without right heart strains. DVT studies negative, suspect 2/2 underlying cancer and recent surgery, started on heparin drip then transitioned to Eliquis.   Patient also noted with lower extremities edema and some pelvic pain with  movement. DVT as above negative, xray pelvis without acute pathologies. , unlikely acute CHF. TTE had grade 1 diastolic dysfunction, will continue with oral lasix with close outpatient followup. Allow compression stockings daily.     PT eval patient, and rec [facility]. Stable for [facility] discharge.    I spent 45 minutes in patient encounter, greater than 50% of the time was spent in counseling/coordination of care/discharge planning.

## 2022-02-09 NOTE — DISCHARGE NOTE PROVIDER - NSDCFUSCHEDAPPT_GEN_ALL_CORE_FT
[facility] ; 02/10/2022 ; NPP Surgonc [address] ; 03/02/2022 ; NPP Endocrin [address] ; 05/10/2022 ; NPP Endocrin [address] [facility] ; 03/02/2022 ; NPP Endocrin [address] [facility] ; 05/10/2022 ; NPP Endocrin [address]

## 2022-02-09 NOTE — DISCHARGE NOTE PROVIDER - NSDCMRMEDTOKEN_GEN_ALL_CORE_FT
acetaminophen 325 mg oral tablet: 2 tab(s) orally every 8 hours, As Needed -for mild pain   apixaban 5 mg oral tablet: 2 tab(s) orally every 12 hours until 2/14  then 1 tab orally every 12 hours afterwards  furosemide 40 mg oral tablet: 1 tab(s) orally 2 times a day  hydrocortisone 20 mg oral tablet: 1 tab(s) orally once a day 8 am  hydrocortisone 5 mg oral tablet: 3 tab(s) orally once a day 14 pm  levothyroxine 88 mcg (0.088 mg) oral tablet: 1 tab(s) orally once a day  lidocaine 4% topical film: Apply topically to legs once a day  Metoprolol Succinate ER 25 mg oral tablet, extended release: 1 tab(s) orally 2 times a day  oxyCODONE 5 mg oral tablet: 1 tab(s) orally 4 times a day, As needed, Moderate Pain (4 - 6) and Severe Pain (7-10)  polyethylene glycol 3350 oral powder for reconstitution: 17 gram(s) orally once a day  pravastatin 40 mg oral tablet: 1 tab(s) orally once a day  senna oral tablet: 2 tab(s) orally once a day (at bedtime)  Vitamin D3 1250 mcg (50,000 intl units) oral capsule: 1 cap(s) orally once a week  Zofran 4 mg oral tablet: 1 tab(s) orally every 6 hours, As Needed -for nausea

## 2022-02-09 NOTE — DISCHARGE NOTE NURSING/CASE MANAGEMENT/SOCIAL WORK - NSDCPEFALRISK_GEN_ALL_CORE
For information on Fall & Injury Prevention, visit: [email or web address] OR  [email or web address] OR  https://www.cdc.gov/steadi/patient.html

## 2022-02-10 ENCOUNTER — APPOINTMENT (OUTPATIENT)
Dept: SURGICAL ONCOLOGY | Facility: CLINIC | Age: 72
End: 2022-02-10

## 2022-02-15 NOTE — CDI QUERY NOTE - NSCDIOTHERTXTBX_GEN_ALL_CORE_HH
SUPPORTING DOCUMENTATION / EVIDENCE:  Presented with shortness of breath, right pleuritic chest pain with inhalation and lower extremity swelling  Found to have pulmonary embolism  Acute respiratory failure with hypoxia documented in discharge summary only      DC:  PRINCIPAL DISCHARGE DIAGNOSIS  Diagnosis: Acute respiratory failure with hypoxia  Assessment and Plan of Treatment:     SECONDARY DISCHARGE DIAGNOSES  Diagnosis: Lower extremity edema      BLOOD GAS:  Blood Gas Profile - Venous (02.03.22 @ 12:36)    pH, Venous: 7.400: As of 6/9/2020 Reference Ranges have been amended for: ALBERTO, COHB, GLUWB,  HCO3, KWB, METHHB, NAWB, O2HB, PCO2.    pCO2, Venous: 38 mmHg    pO2, Venous: 115 mmHg    HCO3, Venous: 24 mmol/L    Base Excess, Venous: -1.3 mmol/L    Oxygen Saturation, Venous: 99.4 %    Blood Gas Source Venous: Venous    ED:   · Chief Complaint: The patient is a 71y Female complaining of shortness of breath.  · Respiratory [+]: SHORTNESS OF BREATH, pleuritic cp  · RESPIRATORY: - - -  Attestation Statements:  AP - Tachycardic, lungs clear, splinted respirations, tachypneic, abd soft, NT/ND, pulses 2+ and symmetrical, bilateral LE edema with bilateral TTP.        Please clarify, in addendum to dc summary, the status of respiratory failure on admission since DC summary was only place documented.   - Acute respiratory failure ruled out; patient did not have respiratory failure during this admission   - Acute respiratory failure present on admission (please document clinical findings for diagnosis)   - Other   - Not clinically significant      Thank you

## 2022-03-02 ENCOUNTER — APPOINTMENT (OUTPATIENT)
Dept: ENDOCRINOLOGY | Facility: CLINIC | Age: 72
End: 2022-03-02
Payer: MEDICARE

## 2022-03-02 VITALS
BODY MASS INDEX: 34.55 KG/M2 | OXYGEN SATURATION: 97 % | DIASTOLIC BLOOD PRESSURE: 84 MMHG | SYSTOLIC BLOOD PRESSURE: 140 MMHG | WEIGHT: 195 LBS | HEIGHT: 63 IN | HEART RATE: 78 BPM

## 2022-03-02 PROCEDURE — 99214 OFFICE O/P EST MOD 30 MIN: CPT

## 2022-03-02 RX ORDER — LOSARTAN POTASSIUM 100 MG/1
100 TABLET, FILM COATED ORAL
Refills: 0 | Status: DISCONTINUED | COMMUNITY
End: 2022-03-02

## 2022-03-02 RX ORDER — PRAVASTATIN SODIUM 40 MG/1
40 TABLET ORAL
Refills: 0 | Status: ACTIVE | COMMUNITY

## 2022-03-02 RX ORDER — CHOLECALCIFEROL (VITAMIN D3) 1250 MCG
1.25 MG CAPSULE ORAL
Refills: 0 | Status: ACTIVE | COMMUNITY

## 2022-03-02 RX ORDER — METOPROLOL SUCCINATE 25 MG/1
25 TABLET, EXTENDED RELEASE ORAL
Refills: 0 | Status: ACTIVE | COMMUNITY

## 2022-03-02 RX ORDER — ROSUVASTATIN CALCIUM 5 MG/1
5 TABLET, FILM COATED ORAL
Refills: 0 | Status: DISCONTINUED | COMMUNITY
End: 2022-03-02

## 2022-03-02 RX ORDER — AMLODIPINE BESYLATE 5 MG/1
5 TABLET ORAL
Refills: 0 | Status: DISCONTINUED | COMMUNITY
End: 2022-03-02

## 2022-03-02 NOTE — ASSESSMENT
[FreeTextEntry1] : Cushing syndrome s/p adrenalectomy\par -Explained to pt the need for frequent follow up and a lot of trial and error with dosing her hydrocortisone to find an appropriate dose and monitor closely for her remaining right adrenal gland to " wake up"\par -For now, decrease afternoon hydrocortisone to 10 mg. Also do not take this dose so close to bedtime, follow the normal curve of bodys production of hormones.\par -Continue Hydrocortisone 20 mg in AM\par -Repeat AM cortisol in 2-3 weeks\par -Will be closely monitoring to see if and when we will continue to decrease hydrocortisone with goal of eliminating entirely\par \par Osteoporosis\par -RX given for repeat DEXA scan, pt has had no prolia in approx 3 years\par \par Hyperthyroid/ Post procedural hypothyroid\par -Continue LT4 as per PCP-need updated TFTs will next labs\par \par HTN\par -Continue BP regimen as per PCP, improved HTN with adrenalectomy \par \par HLD\par -Continue statin as per PCP\par \par RTO 4 weeks\par

## 2022-03-02 NOTE — REVIEW OF SYSTEMS
[Fatigue] : no fatigue [Recent Weight Gain (___ Lbs)] : no recent weight gain [Recent Weight Loss (___ Lbs)] : no recent weight loss [Visual Field Defect] : no visual field defect [Blurred Vision] : no blurred vision [Dysphagia] : no dysphagia [Neck Pain] : no neck pain [Dysphonia] : no dysphonia [Chest Pain] : no chest pain [Shortness Of Breath] : no shortness of breath [Constipation] : no constipation [Diarrhea] : no diarrhea [Polyuria] : no polyuria [Headaches] : no headaches [Polydipsia] : no polydipsia [de-identified] : +1 edema on bl extremities

## 2022-03-02 NOTE — HISTORY OF PRESENT ILLNESS
[FreeTextEntry1] : HFU: 70 y/o Female with a hx of L adrenal mass (initially noted to have Cushing \par syndrome, pre-op cortisol wnl),  s/p elective robotic L adrenalectomy on 1/3/22 \par with Dr. Brown that was well tolerated. Patient presents now to the [facility] ED \par due to hypotension. In the ED, to have elevated Cr. Patient admitted for \par further evaluation of hypotension. Patient received IVFs for MILDRED and given \par midodrine for low BP. BP and SCr have now normalized. Patient to follow up \par outpatient with endocrine (Dr. Fong). At this time, patient is medically \par stable for discharge home. \par \par C.O inability to sleep \par \par Current regimen\par Hydrocortisone 20 mg in AM\par Hydrocortisone 15 mg in PM (was taking this around 6/7 pm) \par \par Prior to surgery/intial eval\par On exam, + small dorsal fat pad \par Pt. had reported a 50 lb weight gain over the past 3 years despite not overindulging and high blood pressure despite being on 3 medications. 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 \par \par HLD\par No updated lipid panel\par Pravstatin 40 mg daily

## 2022-03-02 NOTE — PHYSICAL EXAM
[Alert] : alert [Well Nourished] : well nourished [No Acute Distress] : no acute distress [Normal Sclera/Conjunctiva] : normal sclera/conjunctiva [Normal Hearing] : hearing was normal [Thyroid Not Enlarged] : the thyroid was not enlarged [No Accessory Muscle Use] : no accessory muscle use [Clear to Auscultation] : lungs were clear to auscultation bilaterally [Normal S1, S2] : normal S1 and S2 [Normal Rate] : heart rate was normal [Not Tender] : non-tender [Soft] : abdomen soft [Normal Gait] : normal gait [No Rash] : no rash [No Tremors] : no tremors [Oriented x3] : oriented to person, place, and time [de-identified] : +1 bl edema

## 2022-03-02 NOTE — REASON FOR VISIT
[Follow - Up] : a follow-up visit [Adrenal Evaluation/Adrenal Disorder] : adrenal evaluation/adrenal disorder [Hypothyroidism] : hypothyroidism

## 2022-03-29 ENCOUNTER — APPOINTMENT (OUTPATIENT)
Dept: ENDOCRINOLOGY | Facility: CLINIC | Age: 72
End: 2022-03-29
Payer: MEDICARE

## 2022-03-29 VITALS
WEIGHT: 193 LBS | HEIGHT: 63 IN | BODY MASS INDEX: 34.2 KG/M2 | DIASTOLIC BLOOD PRESSURE: 96 MMHG | SYSTOLIC BLOOD PRESSURE: 150 MMHG

## 2022-03-29 VITALS — DIASTOLIC BLOOD PRESSURE: 82 MMHG | SYSTOLIC BLOOD PRESSURE: 142 MMHG

## 2022-03-29 PROCEDURE — 99214 OFFICE O/P EST MOD 30 MIN: CPT

## 2022-03-29 RX ORDER — OXYCODONE 5 MG/1
5 TABLET ORAL
Refills: 0 | Status: DISCONTINUED | COMMUNITY
End: 2022-03-29

## 2022-03-29 RX ORDER — APIXABAN 5 MG/1
5 TABLET, FILM COATED ORAL
Refills: 0 | Status: DISCONTINUED | COMMUNITY
End: 2022-03-29

## 2022-03-29 RX ORDER — CHLORTHALIDONE 25 MG/1
25 TABLET ORAL
Refills: 0 | Status: DISCONTINUED | COMMUNITY
End: 2022-03-29

## 2022-03-29 RX ORDER — LEVOTHYROXINE SODIUM 88 UG/1
88 TABLET ORAL
Refills: 0 | Status: DISCONTINUED | COMMUNITY
End: 2022-03-29

## 2022-03-29 RX ORDER — PANTOPRAZOLE 40 MG/1
40 TABLET, DELAYED RELEASE ORAL DAILY
Qty: 30 | Refills: 3 | Status: DISCONTINUED | COMMUNITY
Start: 2022-01-11 | End: 2022-03-29

## 2022-03-29 RX ORDER — DEXAMETHASONE 1 MG/1
1 TABLET ORAL DAILY
Qty: 1 | Refills: 0 | Status: DISCONTINUED | COMMUNITY
Start: 2021-01-04 | End: 2022-03-29

## 2022-03-29 RX ORDER — POLYETHYLENE GLYCOL
3350 OINTMENT (GRAM) TOPICAL
Refills: 0 | Status: DISCONTINUED | COMMUNITY
End: 2022-03-29

## 2022-03-29 RX ORDER — SERTRALINE HYDROCHLORIDE 100 MG/1
100 TABLET, FILM COATED ORAL
Refills: 0 | Status: DISCONTINUED | COMMUNITY
End: 2022-03-29

## 2022-03-29 RX ORDER — TRAMADOL HYDROCHLORIDE 50 MG/1
50 TABLET, COATED ORAL
Qty: 25 | Refills: 0 | Status: DISCONTINUED | COMMUNITY
Start: 2021-07-02 | End: 2022-03-29

## 2022-03-29 NOTE — HISTORY OF PRESENT ILLNESS
[FreeTextEntry1] : History of left adrenal mass- initially noted to have Cushing syndrome- s/p elective robotic L adrenalectomy on 1/3/22 by Dr. Esteban \par \par C.O pain in back and hips\par \par Current regimen\par Hydrocortisone 20 mg in AM\par Hydrocortisone 10 mg in PM \par \par Prior to surgery/intial eval\par On exam, + small dorsal fat pad \par Pt. had reported a 50 lb weight gain over the past 3 years despite not overindulging and high blood pressure despite being on 3 medications. \par On labs, adrenal glands are making too much cortisol ( abnormal dex suppression test/midnight salivary cortisol) \par \par **Need updated ACTH, AM cortisol- labs were done incorrectly and pt take AM hydrocortisone when they were drawn\par \par BP stable in office 142/82\par Metoprolol 25 mg daily\par (Was on 3 blood pressure meds prior to adrenalectomy)\par \par Has Osteoporosis \par Was on Prolia, but is now 2 years past due (was on it for a few years)- was controlled by her rheumatologist but due to price of medication and pandemic- has been on no medication\par On weekly vitamin D supplement and a MTV with calcium\par Has had no fractures but does have 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 \par Last TSH 0.13, Free T4 2.1\par \par HLD\par Controlled by PCP,lipid panel at goal\par Pravstatin 40 mg daily

## 2022-03-29 NOTE — REVIEW OF SYSTEMS
[Fatigue] : no fatigue [Recent Weight Gain (___ Lbs)] : no recent weight gain [Recent Weight Loss (___ Lbs)] : no recent weight loss [Visual Field Defect] : no visual field defect [Blurred Vision] : no blurred vision [Dysphagia] : no dysphagia [Neck Pain] : no neck pain [Dysphonia] : no dysphonia [Chest Pain] : no chest pain [Shortness Of Breath] : no shortness of breath [Constipation] : no constipation [Diarrhea] : no diarrhea [Polyuria] : no polyuria [Headaches] : no headaches [Polydipsia] : no polydipsia [FreeTextEntry2] : +difficulty sleeping [FreeTextEntry9] : +1 edema on bl extremities

## 2022-03-29 NOTE — PHYSICAL EXAM
[Alert] : alert [Well Nourished] : well nourished [No Acute Distress] : no acute distress [Normal Sclera/Conjunctiva] : normal sclera/conjunctiva [Normal Hearing] : hearing was normal [Thyroid Not Enlarged] : the thyroid was not enlarged [No Accessory Muscle Use] : no accessory muscle use [Clear to Auscultation] : lungs were clear to auscultation bilaterally [Normal S1, S2] : normal S1 and S2 [Normal Rate] : heart rate was normal [Not Tender] : non-tender [Soft] : abdomen soft [Normal Gait] : normal gait [No Rash] : no rash [No Tremors] : no tremors [Oriented x3] : oriented to person, place, and time [de-identified] : +1 bl edema

## 2022-03-29 NOTE — ASSESSMENT
[FreeTextEntry1] : Cushing syndrome s/p adrenalectomy\par -Explained to pt the need for frequent follow up and a lot of trial and error with dosing her hydrocortisone to find an appropriate dose and monitor closely for her remaining right adrenal gland to " wake up"\par -Continue PM Hyodrocortisone to 10 mg. \par -Continue Hydrocortisone 20 mg in AM\par -Need updated AM Cortisol, ACTH with pt holding AM hydorcortisone until labs are drawn\par \par -Will be closely monitoring to see if and when we will continue to decrease hydrocortisone with goal of eliminating entirely\par \par Osteoporosis\par -T score falls under osteopenia- will continue to monitor DEXA every 2 years\par -Continue vitamin D and calcium supplementation\par -Increase weight bearing exercises if tolerable\par \par Hyperthyroid/ Post procedural hypothyroid\par -Decrease LT4 to 88 mcg daily (or 100 mcg daily M-F and 1/2 tab Sat/Sun as pt just paid for 100 mcg dose) -need updated TFTs will next labs\par \par HTN\par -Continue BP regimen as per PCP, improved HTN with adrenalectomy \par \par HLD\par -Continue statin as per PCP\par \par RTO 5-6 weeks with MD\par

## 2022-04-04 NOTE — CDI QUERY NOTE - NSCDIOTHERTXTBX_GEN_ALL_CORE_HH
Clarification is needed on the etiology of hypotension.    Can you clarify if the etiology of hypotension.  A) Hypotension was an endocrine postprocedural complication of the adrenalectomy.    B) Hypotension due to _____  C) Other, please specify  D) Not clinically significant    Supporting Documentation:  Progress Note Adult-Hospitalist Attending [Charted Location: [facility] [id number]] [Authored: 02-Feb-2022 10:59  Hypotension-resolved  - possible multifactorial, medication related/dehydration/ adrenalectomy ? infection ? no elevated wbc, rectal temp 100.1 on admission   -continue  BB ,will adjust meds as per bp .   -dc  midodrine , aggressive hydration, w cut down ivf   - endocrine following - c/ w  stress steroids tapering dose po   -s/p 7 days of  iv rocephine. bl c/s ngtd

## 2022-05-10 ENCOUNTER — APPOINTMENT (OUTPATIENT)
Dept: ENDOCRINOLOGY | Facility: CLINIC | Age: 72
End: 2022-05-10
Payer: MEDICARE

## 2022-05-10 VITALS
WEIGHT: 191 LBS | SYSTOLIC BLOOD PRESSURE: 126 MMHG | BODY MASS INDEX: 33.84 KG/M2 | DIASTOLIC BLOOD PRESSURE: 86 MMHG | HEIGHT: 63 IN

## 2022-05-10 PROCEDURE — 99214 OFFICE O/P EST MOD 30 MIN: CPT

## 2022-05-10 NOTE — HISTORY OF PRESENT ILLNESS
[FreeTextEntry1] : History of left adrenal mass- initially noted to have Cushing syndrome- s/p elective robotic L adrenalectomy on 1/3/22 by Dr. Esteban \par \par C.O pain in back and hips\par \par Current regimen\par Hydrocortisone 20 mg in AM\par Hydrocortisone 10 mg in PM \par \par Prior to surgery/intial eval\par On exam, + small dorsal fat pad \par Pt. had reported a 50 lb weight gain over the past 3 years despite not overindulging and high blood pressure despite being on 3 medications. \par On labs, adrenal glands are making too much cortisol ( abnormal dex suppression test/midnight salivary cortisol) \par \par \par \par BP stable in office 142/82\par Metoprolol 25 mg daily\par (Was on 3 blood pressure meds prior to adrenalectomy)\par \par Has Osteoporosis \par Was on Prolia, but is now 2 years past due (was on it for a few years)- was controlled by her rheumatologist but due to price of medication and pandemic- has been on no medication\par On weekly vitamin D supplement and a MTV with calcium\par Has had no fractures but does have more arthritis in entire spine\par Last DEXA 3/2022- Osteopenia \par \par Hypothyroid\par S/P FELDMAN approx 40 years for likely hyperthyroid\par Monitored by her PCP\par Current regimen: Synthroid 88 mcg\par Patient advised to take every day in the morning, on an empty stomach, and with no other medications. \par Last TSH 0.13, Free T4 2.1\par \par HLD\par Controlled by PCP,lipid panel at goal\par Pravastatin 40 mg daily \par \par PMH:\par developed pulmonary emboli post-op. On xarelto\par \par c/o polyuria and thirst. Has been on furosemide 40 bid\par \par

## 2022-05-10 NOTE — ASSESSMENT
[FreeTextEntry1] : Cushing's syndrome s/p adrenalectomy with resulting secondary adrenal insufficiency. COntinue to monitor am cortisol; for now reduce pm dose from 10 mg to 5 mg\par Not necessary to take furosemide bid; decrease to one daily\par repeat bone density 11/2022\par

## 2022-06-27 ENCOUNTER — RX RENEWAL (OUTPATIENT)
Age: 72
End: 2022-06-27

## 2022-07-15 NOTE — H&P PST ADULT - NSANTHTIREDRD_ENT_A_CORE
Quality 110: Preventive Care And Screening: Influenza Immunization: Influenza Immunization Ordered or Recommended, but not Administered due to system reason
Additional Notes: Patient reports completed COVID-19 vaccine
Quality 226: Preventive Care And Screening: Tobacco Use: Screening And Cessation Intervention: Patient screened for tobacco use and is an ex/non-smoker
Detail Level: Detailed
No

## 2022-09-13 ENCOUNTER — APPOINTMENT (OUTPATIENT)
Dept: ENDOCRINOLOGY | Facility: CLINIC | Age: 72
End: 2022-09-13

## 2022-09-13 VITALS — OXYGEN SATURATION: 97 % | HEART RATE: 82 BPM | DIASTOLIC BLOOD PRESSURE: 84 MMHG | SYSTOLIC BLOOD PRESSURE: 142 MMHG

## 2022-09-13 VITALS — HEIGHT: 63 IN | BODY MASS INDEX: 32.6 KG/M2 | WEIGHT: 184 LBS

## 2022-09-13 PROCEDURE — 99214 OFFICE O/P EST MOD 30 MIN: CPT

## 2022-09-13 NOTE — ASSESSMENT
[FreeTextEntry1] : Cushing syndrome s/p adrenalectomy\par -Explained to pt the need for frequent follow up and a lot of trial and error with dosing her hydrocortisone to find an appropriate dose and monitor closely for her remaining right adrenal gland to " wake up"\par -Continue PM Hyodrocortisone to 5 mg. \par -Continue Hydrocortisone 20 mg in AM\par -Need updated AM Cortisol, ACTH with pt holding AM hydorcortisone and PM cortisone the night before until labs are drawn\par \par -Will be closely monitoring to see if and when we will continue to decrease hydrocortisone with goal of eliminating entirely\par \par Osteoporosis\par -T score falls under osteopenia- will continue to monitor DEXA every 2 years\par -Continue vitamin D and calcium supplementation\par -Increase weight bearing exercises if tolerable\par \par Hyperthyroid/ Post procedural hypothyroid\par -Continue LT4 to 88 mcg daily\par \par HTN\par -Continue BP regimen as per PCP, improved HTN with adrenalectomy \par \par HLD\par -Continue statin as per PCP\par \par RTO 8-12 weeks NP, 5-6 months MD

## 2022-09-13 NOTE — REVIEW OF SYSTEMS
[Fatigue] : no fatigue [Recent Weight Gain (___ Lbs)] : no recent weight gain [Recent Weight Loss (___ Lbs)] : recent weight loss: [unfilled] lbs [Visual Field Defect] : no visual field defect [Blurred Vision] : no blurred vision [Dysphagia] : no dysphagia [Neck Pain] : no neck pain [Dysphonia] : no dysphonia [Chest Pain] : no chest pain [Shortness Of Breath] : no shortness of breath [Constipation] : no constipation [Diarrhea] : no diarrhea [Polyuria] : no polyuria [Polydipsia] : no polydipsia [FreeTextEntry9] : left hip flexor area tenderness [de-identified] : +excessive sweating

## 2022-09-13 NOTE — PHYSICAL EXAM
[Alert] : alert [Well Nourished] : well nourished [No Acute Distress] : no acute distress [Normal Sclera/Conjunctiva] : normal sclera/conjunctiva [Normal Hearing] : hearing was normal [Thyroid Not Enlarged] : the thyroid was not enlarged [No Accessory Muscle Use] : no accessory muscle use [Clear to Auscultation] : lungs were clear to auscultation bilaterally [Normal S1, S2] : normal S1 and S2 [Normal Rate] : heart rate was normal [Not Tender] : non-tender [Soft] : abdomen soft [Normal Gait] : normal gait [No Rash] : no rash [No Tremors] : no tremors [Oriented x3] : oriented to person, place, and time [de-identified] : +1 bl edema

## 2022-09-13 NOTE — HISTORY OF PRESENT ILLNESS
[FreeTextEntry1] : History of left adrenal mass- initially noted to have Cushing syndrome- s/p elective robotic L adrenalectomy on 1/3/22 by Dr. Esteban \par \par C.O excessive sweating for 3 weeks, left hip pain\par \par Current regimen\par Hydrocortisone 20 mg in AM\par Hydrocortisone 5 mg in PM \par \par Prior to surgery/intial eval\par On exam, + small dorsal fat pad \par Pt. had reported a 50 lb weight gain over the past 3 years despite not overindulging and high blood pressure despite being on 3 medications. \par On labs, adrenal glands are making too much cortisol ( abnormal dex suppression test/midnight salivary cortisol) \par \par **Need updated ACTH, AM cortisol- labs were done incorrectly and pt take AM hydrocortisone when they were drawn\par \par BP stable in office 142/84\par Metoprolol 25 mg daily\par Furosemide 40 mg daily\par (Was on 3 blood pressure meds prior to adrenalectomy)\par \par Has Osteoporosis \par Was on Prolia, but is now 2 years past due (was on it for a few years)- was controlled by her rheumatologist but due to price of medication and pandemic- has been on no medication\par On weekly vitamin D supplement and a MTV with calcium\par Has had no fractures but does have more arthritis in entire spine\par Last DEXA 3/2022- Osteopenia \par \par Hypothyroid\par S/P FELDMAN approx 40 years for likely hyperthyroid\par Monitored by her PCP\par Current regimen: Synthroid 88 mcg\par Patient advised to take every day in the morning, on an empty stomach, and with no other medications. \par Need updated TFTs \par \par HLD\par Controlled by PCP,lipid panel at goal\par Pravstatin 40 mg daily \par

## 2022-11-08 ENCOUNTER — NON-APPOINTMENT (OUTPATIENT)
Age: 72
End: 2022-11-08

## 2022-11-15 ENCOUNTER — OFFICE (OUTPATIENT)
Dept: URBAN - METROPOLITAN AREA CLINIC 115 | Facility: CLINIC | Age: 72
Setting detail: OPHTHALMOLOGY
End: 2022-11-15
Payer: COMMERCIAL

## 2022-11-15 DIAGNOSIS — H26.492: ICD-10-CM

## 2022-11-15 DIAGNOSIS — H17.823: ICD-10-CM

## 2022-11-15 DIAGNOSIS — H01.001: ICD-10-CM

## 2022-11-15 DIAGNOSIS — H01.004: ICD-10-CM

## 2022-11-15 PROCEDURE — 92014 COMPRE OPH EXAM EST PT 1/>: CPT | Performed by: OPHTHALMOLOGY

## 2022-11-15 ASSESSMENT — REFRACTION_CURRENTRX
OD_SPHERE: +0.25
OS_CYLINDER: -0.50
OD_AXIS: 041
OS_VPRISM_DIRECTION: SV
OS_OVR_VA: 20/
OS_CYLINDER: -0.50
OD_AXIS: 025
OS_VPRISM_DIRECTION: SV
OD_OVR_VA: 20/
OS_AXIS: 141
OD_CYLINDER: -1.00
OS_SPHERE: -0.25
OS_SPHERE: PLANO
OD_OVR_VA: 20/
OS_AXIS: 119
OD_OVR_VA: 20/
OS_OVR_VA: 20/
OS_AXIS: 051
OS_CYLINDER: -0.25
OD_AXIS: 022
OS_SPHERE: -0.25
OS_OVR_VA: 20/
OD_SPHERE: +0.25
OD_CYLINDER: -0.75
OD_VPRISM_DIRECTION: SV
OD_VPRISM_DIRECTION: SV
OD_CYLINDER: -0.75
OD_VPRISM_DIRECTION: SV
OD_SPHERE: PLANO
OS_VPRISM_DIRECTION: SV

## 2022-11-15 ASSESSMENT — REFRACTION_MANIFEST
OD_AXIS: 25
OD_CYLINDER: -0.75
OD_VA2: 20/20
OD_CYLINDER: -0.75
OS_VA2: 20/20
OD_VA1: 20/20-2
OU_VA: 20/20
OD_SPHERE: -0.25
OS_SPHERE: PL
OD_SPHERE: +0.25
OS_AXIS: 150
OS_ADD: +2.50
OU_VA: 20/20-2
OD_ADD: +2.50
OD_ADD: +2.50
OS_CYLINDER: -0.75
OS_SPHERE: -0.50
OD_AXIS: 025
OS_CYLINDER: -0.75
OS_VA1: 20/20-2
OS_ADD: +2.50
OS_VA1: 20/20-2
OS_AXIS: 155
OD_VA1: 20/20-2

## 2022-11-15 ASSESSMENT — CONFRONTATIONAL VISUAL FIELD TEST (CVF)
OS_FINDINGS: FULL
OD_FINDINGS: FULL

## 2022-11-15 ASSESSMENT — CORNEAL SURGICAL SCARRING
OD_SCARRING: MID PERIPHERAL ANTERIOR STROMAL
OS_SCARRING: MID PERIPHERAL ANTERIOR STROMAL

## 2022-11-15 ASSESSMENT — SPHEQUIV_DERIVED
OD_SPHEQUIV: -0.625
OD_SPHEQUIV: -0.125
OS_SPHEQUIV: -0.25
OS_SPHEQUIV: -0.875
OD_SPHEQUIV: 0.375

## 2022-11-15 ASSESSMENT — VISUAL ACUITY
OS_BCVA: 20/30
OD_BCVA: 20/30-1

## 2022-11-15 ASSESSMENT — REFRACTION_AUTOREFRACTION
OD_AXIS: 055
OS_SPHERE: 0.00
OS_AXIS: 141
OD_SPHERE: +0.75
OS_CYLINDER: -0.50
OD_CYLINDER: -0.75

## 2022-11-15 ASSESSMENT — TONOMETRY
OD_IOP_MMHG: 11
OS_IOP_MMHG: 11

## 2022-11-15 ASSESSMENT — LID EXAM ASSESSMENTS
OD_BLEPHARITIS: RUL 1+
OD_MEIBOMITIS: RUL 1+
OS_MEIBOMITIS: LUL 1+
OS_BLEPHARITIS: LUL 1+

## 2022-11-15 ASSESSMENT — LID POSITION - DERMATOCHALASIS
OS_DERMATOCHALASIS: LUL 1+ 2+
OD_DERMATOCHALASIS: RUL 1+ 2+

## 2022-11-29 NOTE — H&P PST ADULT - ANESTHESIA, PREVIOUS REACTION, PROFILE
[address] Yvette Velez for cardiology at [facility]. Was advised they were on bypass. [facility] will check to see if [facility] has beds available.      Rachel AVERY Reser  11/29/22 2501 patient and son/delayed awakening

## 2022-11-30 ENCOUNTER — APPOINTMENT (OUTPATIENT)
Dept: SURGICAL ONCOLOGY | Facility: CLINIC | Age: 72
End: 2022-11-30

## 2022-11-30 VITALS
WEIGHT: 184 LBS | HEART RATE: 74 BPM | TEMPERATURE: 98 F | OXYGEN SATURATION: 95 % | DIASTOLIC BLOOD PRESSURE: 83 MMHG | BODY MASS INDEX: 32.6 KG/M2 | HEIGHT: 63 IN | SYSTOLIC BLOOD PRESSURE: 148 MMHG

## 2022-11-30 PROCEDURE — 99213 OFFICE O/P EST LOW 20 MIN: CPT

## 2022-12-04 ENCOUNTER — EMERGENCY (EMERGENCY)
Facility: HOSPITAL | Age: 72
LOS: 1 days | Discharge: HOME | End: 2022-12-04
Attending: EMERGENCY MEDICINE
Payer: COMMERCIAL

## 2022-12-04 VITALS
DIASTOLIC BLOOD PRESSURE: 88 MMHG | RESPIRATION RATE: 18 BRPM | OXYGEN SATURATION: 94 % | TEMPERATURE: 98 F | WEIGHT: 186.95 LBS | HEART RATE: 65 BPM | HEIGHT: 64 IN | SYSTOLIC BLOOD PRESSURE: 155 MMHG

## 2022-12-04 DIAGNOSIS — Z90.89 ACQUIRED ABSENCE OF OTHER ORGANS: Chronic | ICD-10-CM

## 2022-12-04 DIAGNOSIS — E89.6 POSTPROCEDURAL ADRENOCORTICAL (-MEDULLARY) HYPOFUNCTION: Chronic | ICD-10-CM

## 2022-12-04 DIAGNOSIS — Z98.49 CATARACT EXTRACTION STATUS, UNSPECIFIED EYE: Chronic | ICD-10-CM

## 2022-12-04 DIAGNOSIS — Z90.710 ACQUIRED ABSENCE OF BOTH CERVIX AND UTERUS: Chronic | ICD-10-CM

## 2022-12-04 PROCEDURE — 99284 EMERGENCY DEPT VISIT MOD MDM: CPT

## 2022-12-04 PROCEDURE — 99284 EMERGENCY DEPT VISIT MOD MDM: CPT | Mod: 25

## 2022-12-04 PROCEDURE — 96375 TX/PRO/DX INJ NEW DRUG ADDON: CPT

## 2022-12-04 PROCEDURE — 96374 THER/PROPH/DIAG INJ IV PUSH: CPT

## 2022-12-04 RX ORDER — HYDROMORPHONE HYDROCHLORIDE 2 MG/ML
0.5 INJECTION INTRAMUSCULAR; INTRAVENOUS; SUBCUTANEOUS ONCE
Refills: 0 | Status: DISCONTINUED | OUTPATIENT
Start: 2022-12-04 | End: 2022-12-04

## 2022-12-04 RX ORDER — OXYCODONE AND ACETAMINOPHEN 5; 325 MG/1; MG/1
1 TABLET ORAL
Qty: 10 | Refills: 0
Start: 2022-12-04 | End: 2022-12-08

## 2022-12-04 RX ORDER — KETOROLAC TROMETHAMINE 30 MG/ML
30 SYRINGE (ML) INJECTION ONCE
Refills: 0 | Status: DISCONTINUED | OUTPATIENT
Start: 2022-12-04 | End: 2022-12-04

## 2022-12-04 RX ADMIN — HYDROMORPHONE HYDROCHLORIDE 0.5 MILLIGRAM(S): 2 INJECTION INTRAMUSCULAR; INTRAVENOUS; SUBCUTANEOUS at 19:20

## 2022-12-04 RX ADMIN — Medication 30 MILLIGRAM(S): at 19:20

## 2022-12-04 NOTE — ED ADULT NURSE NOTE - OBJECTIVE STATEMENT
Patient presents to ER C/O lower back pain, reports she sneezed this AM and suddenly experienced severe pain, reports HX of chronic back pain due to arthritis and osteoporosis, denies recent trama/falls, resp even/unlabored, denies paresthesias, no N/V, no urinary symptoms.

## 2022-12-04 NOTE — ED ADULT NURSE REASSESSMENT NOTE - NS ED NURSE REASSESS COMMENT FT1
assumed care from PADMINI Sandoval.  pt resting comfortably in stretcher.  denies any complaints. PIV intact.

## 2022-12-04 NOTE — ED PROVIDER NOTE - CPE EDP PSYCH NORM
The patient's assessment was reviewed with Dr. Forrest and a collaborative plan of care was established.
normal...

## 2022-12-04 NOTE — ED PROVIDER NOTE - PATIENT PORTAL LINK FT
You can access the FollowMyHealth Patient Portal offered by [facility] by registering at the following website: [email or web address]. By joining [facility]’s FollowMyHealth portal, you will also be able to view your health information using other applications (apps) compatible with our system.

## 2022-12-04 NOTE — ED PROVIDER NOTE - CROS ED CONS ALL NEG
Pt states she would like to speak to a  prior to discharge. Message left on Shruthi's cell phone.    negative...

## 2022-12-04 NOTE — ED PROVIDER NOTE - OBJECTIVE STATEMENT
Patient presents with 6 out of 10 right paraspinal lumbar pain worse with movement after coughing earlier this afternoon.  She denies any bowel or bladder retention or incontinence.  She has no new motor or sensory deficits.  She is not on blood thinners.  She is just concerned with how much her back hurts when she moves.  Denies headache or neck pain.  Denies chest pain or shortness of breath.  Denies abdominal pain.  Prior treatment with PCP prescribed oxycodone was helpful but not completely effective.  No other acute issues symptoms or concerns

## 2022-12-13 ENCOUNTER — NON-APPOINTMENT (OUTPATIENT)
Age: 72
End: 2022-12-13

## 2022-12-20 ENCOUNTER — RX RENEWAL (OUTPATIENT)
Age: 72
End: 2022-12-20

## 2022-12-22 ENCOUNTER — NON-APPOINTMENT (OUTPATIENT)
Age: 72
End: 2022-12-22

## 2022-12-22 ENCOUNTER — APPOINTMENT (OUTPATIENT)
Dept: OBGYN | Facility: CLINIC | Age: 72
End: 2022-12-22

## 2022-12-22 VITALS
DIASTOLIC BLOOD PRESSURE: 86 MMHG | SYSTOLIC BLOOD PRESSURE: 128 MMHG | HEIGHT: 63 IN | WEIGHT: 184 LBS | BODY MASS INDEX: 32.6 KG/M2

## 2022-12-22 DIAGNOSIS — Z86.018 PERSONAL HISTORY OF OTHER BENIGN NEOPLASM: ICD-10-CM

## 2022-12-22 DIAGNOSIS — Z87.39 PERSONAL HISTORY OF OTHER DISEASES OF THE MUSCULOSKELETAL SYSTEM AND CONNECTIVE TISSUE: ICD-10-CM

## 2022-12-22 DIAGNOSIS — Z83.438 FAMILY HISTORY OF OTHER DISORDER OF LIPOPROTEIN METABOLISM AND OTHER LIPIDEMIA: ICD-10-CM

## 2022-12-22 DIAGNOSIS — Z86.39 PERSONAL HISTORY OF OTHER ENDOCRINE, NUTRITIONAL AND METABOLIC DISEASE: ICD-10-CM

## 2022-12-22 DIAGNOSIS — E89.0 POSTPROCEDURAL HYPOTHYROIDISM: ICD-10-CM

## 2022-12-22 DIAGNOSIS — Z86.79 PERSONAL HISTORY OF OTHER DISEASES OF THE CIRCULATORY SYSTEM: ICD-10-CM

## 2022-12-22 DIAGNOSIS — Z82.49 FAMILY HISTORY OF ISCHEMIC HEART DISEASE AND OTHER DISEASES OF THE CIRCULATORY SYSTEM: ICD-10-CM

## 2022-12-22 PROCEDURE — 99202 OFFICE O/P NEW SF 15 MIN: CPT

## 2022-12-22 NOTE — HISTORY OF PRESENT ILLNESS
[Menarche Age: ____] : age at menarche was [unfilled] [Menopause Age: ____] : age at menopause was [unfilled] [Y] : Positive pregnancy history [Mammogramdate] : 08/22 [BoneDensityDate] : 03/22 [ColonoscopyDate] : 01/18 [PGxTotal] : 1 [[facility] HospitalxFulerm] : 1 [PGHxPremature] : 0 [PGHxAbortions] : 0 [[facility] Centeriving] : 1 [PGHxABInduced] : 0 [PGHxABSpont] : 0 [PGHxEctopic] : 0 [PGHxMultBirths] : 0

## 2022-12-26 NOTE — ASSESSMENT
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 No necrosis or capsular invasion seen.  No increase in mitotic figures. \par 2) Adrenal cortical adenoma (3 cm), focal capsular invasion seen, no necrosis or increase in mitotic figures. \par \par Continues to closely follow with Brissa Henning NP (endo), has been undergoing adjustments to her hydrocortisone dosages and seeing a response in ACTH levels.\par Last labs 11/1/2022 - ACTH 51 (H), AM Cortisol 1.2 (L)\par -- advised by Endo to repeat labs in 4 weeks \par \par PLAN:\par Endo follow-up\par Consider rheum\par No acute surgical issues at this time

## 2022-12-26 NOTE — PHYSICAL EXAM
[Normal] : supple, no neck mass and thyroid not enlarged [Normal Neck Lymph Nodes] : normal neck lymph nodes  [Normal Supraclavicular Lymph Nodes] : normal supraclavicular lymph nodes [Normal Groin Lymph Nodes] : normal groin lymph nodes [Normal Axillary Lymph Nodes] : normal axillary lymph nodes [Normal] : oriented to person, place and time, with appropriate affect [de-identified] : soft, ND, NT;  Trochar sites C/D/I

## 2022-12-26 NOTE — REASON FOR VISIT
[Follow-Up Visit] : a follow-up visit for [FreeTextEntry2] : s/p left adrenalectomy 1/3/2022- Cortical adenoma

## 2022-12-26 NOTE — HISTORY OF PRESENT ILLNESS
[de-identified] : Ms. SONAM JENSEN is a 72 year old woman here today for a follow-up visit \par \par PMHx HTN, HLD, Obesity\par She was seen in initial consultation on 7/29/2021, referred by Dr. Román Patricia. \par \par Ms. Jensen was incidentally found to have a left 1.1 cm adrenal nodule on an MRI of her lumbosacral spine. Subsequently,.she underwent an abdominal MRI in 10/2020 revealing 2 hepatic cysts, possible gallbladder wall adenomyosis, and 2 left adrenal nodules measuring 2.5 x 1.4 cm each.  This MRI was limited due to lack of IV contrast.   \par \par At the time, pt. had reported a 50 lb weight gain over the past 3 years despite not overindulging and high blood pressure despite being on 3 medications.  She sought care with Dr. Henning (Endocrinology) and underwent further workup.  \par \par MRI Abdomen w/ IV contrast performed on 5/21/2021 showed 2 adrenal adenomas (the left gland has a upper pole 7 mm nodule and a lower pole 17 x 22 mm nodule -consistent with adrenal adenomas).  Other findings include liver cysts.  \par \par Pt. Cushing's syndrome confirmed by failure of dexamethasone suppression and elevated midnight salivary cortisol. ACTH suppressed, so this is consistent with adrenal hyperfunction due to left adrenal adenoma. Advised surgical consult. \par \par BW:  Free urine cortisol (23.7- WNL); Cortisol Saliva (0.34) Metanephrine (33-L); Total Metanephrines (171- L); Normetanephrine (138- WNL);\par \par Pt reports a 50 lb weight gain over the past few years, especially in the mid section. She states she is very anxious and often feels stressed. She feels tired all the time, has muscle pains, feels weak.  Dry skin.  Increased urination. B/L leg cellulitis 2 months ago- now resolved. Osteoporosis.  Continues to have high blood pressure. States her BP was 200/101 this morning.  Reports pelvic cramping and lower abdominal discomfort, frequent urination. \par \par PMH: HTN, HLD, Obesity, Osteoporosis, s/p FELDMAN for hyperthyroidism 40 years ago, now with hypothyroidism (on Synthroid 88 mcg per day), anxiety\par PSH: Cataract surgery, MELECIO-BSO in her late 40's for ovarian cysts, benign left breast excisional biopsy, sinus surgery, tonsillectomy. \par Social Hx: Former smoker (1-1.5 ppd x 40 years, quit 2018), social alcohol use,  51 years, 1 son, 2 grandchildren. \par Family Hx: Mother with ovarian cancer in ther late 40's. Does not know paternal history. 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 No necrosis or capsular invasion seen.  No increase in mitotic figures. \par 2) Adrenal cortical adenoma (3 cm), focal capsular invasion seen, no necrosis or increase in mitotic figures. \par \par 1/20/22- Pt. called office on 1/10 with c/o N/V, constipation, inability to pass flatus, chills and sweats.  Pt. was advised to go to the ED. She was evaluated [facility]. 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  BW was WNL.  Pt. was discharged home.  \par \par Continues to closely follow with Brissa Henning NP (edilia), has been undergoing adjustments to her hydrocortisone dosages and seeing a response in ACTH levels.\par Last labs 11/1/2022 - ACTH 51 (H), AM Cortisol 1.2 (L)\par -- advised by Endo to repeat labs in 4 weeks

## 2022-12-26 NOTE — CONSULT LETTER
[Dear  ___] : Dear  [unfilled], [Consult Letter:] : I had the pleasure of evaluating your patient, [unfilled]. [Please see my note below.] : Please see my note below. [Consult Closing:] : Thank you very much for allowing me to participate in the care of this patient.  If you have any questions, please do not hesitate to contact me. [Sincerely,] : Sincerely, [DrSilvia  ___] : Dr. NATH [___] : [unfilled] [FreeTextEntry3] : Shyam Brown MD, MPH, FACS, FSSO\par , [facility]\par [facility]\par Associate Professor of Surgery\par [employer] [facility]

## 2022-12-27 ENCOUNTER — APPOINTMENT (OUTPATIENT)
Dept: ENDOCRINOLOGY | Facility: CLINIC | Age: 72
End: 2022-12-27

## 2023-01-09 ENCOUNTER — OFFICE (OUTPATIENT)
Dept: URBAN - METROPOLITAN AREA CLINIC 115 | Facility: CLINIC | Age: 73
Setting detail: OPHTHALMOLOGY
End: 2023-01-09
Payer: MEDICARE

## 2023-01-09 DIAGNOSIS — Z20.822: ICD-10-CM

## 2023-01-09 DIAGNOSIS — Z01.812: ICD-10-CM

## 2023-01-09 PROCEDURE — 99211 OFF/OP EST MAY X REQ PHY/QHP: CPT | Performed by: OPHTHALMOLOGY

## 2023-01-10 ASSESSMENT — REFRACTION_CURRENTRX
OD_AXIS: 022
OS_VPRISM_DIRECTION: SV
OS_CYLINDER: -0.25
OS_AXIS: 141
OD_OVR_VA: 20/
OD_SPHERE: +0.25
OD_OVR_VA: 20/
OD_SPHERE: +0.25
OS_AXIS: 051
OS_OVR_VA: 20/
OD_VPRISM_DIRECTION: SV
OD_CYLINDER: -1.00
OS_OVR_VA: 20/
OS_VPRISM_DIRECTION: SV
OS_SPHERE: -0.25
OS_SPHERE: PLANO
OD_AXIS: 025
OD_VPRISM_DIRECTION: SV
OS_CYLINDER: -0.50
OD_SPHERE: PLANO
OD_CYLINDER: -0.75
OD_OVR_VA: 20/
OS_VPRISM_DIRECTION: SV
OS_CYLINDER: -0.50
OD_VPRISM_DIRECTION: SV
OS_OVR_VA: 20/
OD_AXIS: 041
OD_CYLINDER: -0.75
OS_AXIS: 119
OS_SPHERE: -0.25

## 2023-01-10 ASSESSMENT — REFRACTION_MANIFEST
OD_ADD: +2.50
OS_ADD: +2.50
OD_CYLINDER: -0.75
OS_VA1: 20/20-2
OS_AXIS: 150
OS_ADD: +2.50
OD_VA1: 20/20-2
OU_VA: 20/20
OD_VA2: 20/20
OS_SPHERE: -0.50
OD_ADD: +2.50
OS_CYLINDER: -0.75
OS_SPHERE: PL
OS_AXIS: 155
OU_VA: 20/20-2
OD_CYLINDER: -0.75
OD_SPHERE: +0.25
OS_VA2: 20/20
OS_VA1: 20/20-2
OS_CYLINDER: -0.75
OD_SPHERE: -0.25
OD_AXIS: 025
OD_VA1: 20/20-2
OD_AXIS: 25

## 2023-01-10 ASSESSMENT — REFRACTION_AUTOREFRACTION
OS_AXIS: 141
OS_CYLINDER: -0.50
OD_SPHERE: +0.75
OD_CYLINDER: -0.75
OD_AXIS: 055
OS_SPHERE: 0.00

## 2023-01-10 ASSESSMENT — SPHEQUIV_DERIVED
OD_SPHEQUIV: -0.125
OD_SPHEQUIV: 0.375
OD_SPHEQUIV: -0.625
OS_SPHEQUIV: -0.875
OS_SPHEQUIV: -0.25

## 2023-01-10 ASSESSMENT — VISUAL ACUITY
OS_BCVA: 20/30
OD_BCVA: 20/30-1

## 2023-01-12 ENCOUNTER — ASC (OUTPATIENT)
Dept: URBAN - METROPOLITAN AREA SURGERY 8 | Facility: SURGERY | Age: 73
Setting detail: OPHTHALMOLOGY
End: 2023-01-12
Payer: MEDICARE

## 2023-01-12 DIAGNOSIS — H26.492: ICD-10-CM

## 2023-01-12 PROCEDURE — 66821 AFTER CATARACT LASER SURGERY: CPT | Performed by: OPHTHALMOLOGY

## 2023-01-18 RX ORDER — HYDROCORTISONE 5 MG/1
5 TABLET ORAL
Qty: 90 | Refills: 3 | Status: DISCONTINUED | COMMUNITY
Start: 1900-01-01 | End: 2023-01-18

## 2023-01-24 ENCOUNTER — OFFICE (OUTPATIENT)
Dept: URBAN - METROPOLITAN AREA CLINIC 115 | Facility: CLINIC | Age: 73
Setting detail: OPHTHALMOLOGY
End: 2023-01-24
Payer: MEDICARE

## 2023-01-24 DIAGNOSIS — H26.492: ICD-10-CM

## 2023-01-24 PROBLEM — Z01.812 ENCOUNTER FOR PREPROCEDURAL LABORATORY EXAMINATION: Status: ACTIVE | Noted: 2023-01-09

## 2023-01-24 PROCEDURE — 99024 POSTOP FOLLOW-UP VISIT: CPT | Performed by: OPHTHALMOLOGY

## 2023-01-24 ASSESSMENT — SPHEQUIV_DERIVED
OD_SPHEQUIV: -0.625
OD_SPHEQUIV: 0
OD_SPHEQUIV: -0.125
OS_SPHEQUIV: -0.875
OS_SPHEQUIV: -0.25

## 2023-01-24 ASSESSMENT — REFRACTION_CURRENTRX
OS_CYLINDER: -0.25
OS_SPHERE: PLANO
OD_VPRISM_DIRECTION: SV
OD_SPHERE: +0.25
OS_OVR_VA: 20/
OS_VPRISM_DIRECTION: SV
OD_SPHERE: +0.25
OD_OVR_VA: 20/
OD_VPRISM_DIRECTION: SV
OD_SPHERE: PLANO
OD_OVR_VA: 20/
OS_AXIS: 051
OS_CYLINDER: -0.50
OS_CYLINDER: -0.50
OS_VPRISM_DIRECTION: SV
OD_OVR_VA: 20/
OD_CYLINDER: -1.00
OD_CYLINDER: -0.75
OS_OVR_VA: 20/
OS_SPHERE: -0.25
OS_SPHERE: -0.25
OD_VPRISM_DIRECTION: SV
OD_AXIS: 041
OS_VPRISM_DIRECTION: SV
OD_AXIS: 025
OD_AXIS: 022
OD_CYLINDER: -0.75
OS_AXIS: 119
OS_AXIS: 141
OS_OVR_VA: 20/

## 2023-01-24 ASSESSMENT — TONOMETRY
OS_IOP_MMHG: 12
OD_IOP_MMHG: 11

## 2023-01-24 ASSESSMENT — REFRACTION_MANIFEST
OS_AXIS: 150
OS_CYLINDER: -0.75
OS_ADD: +2.50
OD_VA2: 20/20
OD_ADD: +2.50
OS_CYLINDER: -0.75
OD_ADD: +2.50
OS_VA1: 20/20-2
OU_VA: 20/20
OS_VA2: 20/20
OS_AXIS: 155
OS_VA1: 20/20-2
OD_AXIS: 025
OD_AXIS: 25
OD_CYLINDER: -0.75
OS_ADD: +2.50
OS_SPHERE: -0.50
OD_SPHERE: +0.25
OD_VA1: 20/20-2
OD_CYLINDER: -0.75
OU_VA: 20/20-2
OD_SPHERE: -0.25
OS_SPHERE: PL
OD_VA1: 20/20-2

## 2023-01-24 ASSESSMENT — REFRACTION_AUTOREFRACTION
OD_AXIS: 053
OS_AXIS: 101
OS_SPHERE: +0.25
OD_CYLINDER: -1.00
OD_SPHERE: +0.50
OS_CYLINDER: -1.00

## 2023-01-24 ASSESSMENT — CORNEAL SURGICAL SCARRING
OS_SCARRING: MID PERIPHERAL ANTERIOR STROMAL
OD_SCARRING: MID PERIPHERAL ANTERIOR STROMAL

## 2023-01-24 ASSESSMENT — LID EXAM ASSESSMENTS
OD_MEIBOMITIS: RUL 1+
OD_BLEPHARITIS: RUL 1+
OS_BLEPHARITIS: LUL 1+
OS_MEIBOMITIS: LUL 1+

## 2023-01-24 ASSESSMENT — CONFRONTATIONAL VISUAL FIELD TEST (CVF)
OD_FINDINGS: FULL
OS_FINDINGS: FULL

## 2023-01-24 ASSESSMENT — LID POSITION - DERMATOCHALASIS
OD_DERMATOCHALASIS: RUL 1+ 2+
OS_DERMATOCHALASIS: LUL 1+ 2+

## 2023-01-24 ASSESSMENT — VISUAL ACUITY
OS_BCVA: 20/25
OD_BCVA: 20/25

## 2023-01-24 NOTE — ED ADULT TRIAGE NOTE - ARRIVAL FROM
Hospital Medicine History & Physical      PCP: Hanna Rosas DO    Date of Admission: 1/24/2023    Date of Service: Pt seen/examined on 1/24 and Admitted to Inpatient with expected LOS greater than two midnights due to medical therapy. Chief Complaint:  mechanical fall hand pain       History Of Present Illness:  78 y.o. female with hx below who presented to [facility] with her caretaker Per caretaker patient has a history of dementia. She stays with patient overnight. Patient has lost her  and often goes the door to look for her . She states that she believes patient got up to go to the door to look for her  and fell. States the fall was unwitnessed. Denies anticoagulation. Patient's history is very unreliable but denies any headache, neck or back pain, chest pain or shortness of breath, abdominal pain or pain in any of her other extremities. ED work up: head CT w/o acute issues, ECG none acute, suffered a dislocated thumb and open area. And DELMAR 1.7 mild trop elevation 0.03    The pt was transferred to [facility] for further ortho eval and Delmar     On arrival she is with her daughter in room she is no acute distress.      Past Medical History:          Diagnosis Date    Arthritis     Chicken pox     Chills     CKD (chronic kidney disease) stage 3, GFR 30-59 ml/min ([facility]) 1/10/2019    Current moderate episode of major depressive disorder without prior episode ([facility] [address] 4/28/2022    Fever     Hip pain     Hyperlipidemia     Hypertension     Measles     Miscarriage     Osteoarthritis     Pneumonia     Poor circulation     Ringing in ears     Sacroiliitis ([facility] [address] 4/15/2016    Sensorineural hearing loss, bilateral 10/20/2020    Swelling of both ankles     Tonsillitis        Past Surgical History:          Procedure Laterality Date    COLONOSCOPY  11/09/2016    normal/ hemmoroids    COLONOSCOPY N/A 5/3/2021    COLONOSCOPY WITH BIOPSY performed by Edel Carnes MD at [facility]    OTHER SURGICAL HISTORY      COLONOSCOPY WITH BIOPSY (N/A )    SHOULDER SURGERY         Medications Prior to Admission:      Prior to Admission medications    Medication Sig Start Date End Date Taking? Authorizing Provider   Multiple Vitamins-Minerals (THERAPEUTIC MULTIVITAMIN-MINERALS) tablet Take 1 tablet by mouth daily   Yes Historical Provider, MD   melatonin 5 MG TABS tablet Take 5 mg by mouth at bedtime   Yes Historical Provider, MD   magnesium (MAGNESIUM-OXIDE) 250 MG TABS tablet Take 250 mg by mouth at bedtime   Yes Historical Provider, MD   tiZANidine (ZANAFLEX) 2 MG tablet Take 1 tablet by mouth every 8 hours as needed (muscle spasm) 1/13/23   Buzz Brandon DO   hydrOXYzine HCl (ATARAX) 25 MG tablet TAKE 1 TABLET BY MOUTH ONCE DAILY AS NEEDED FOR ANXIETY 12/19/22   Buzz Brandon DO   diclofenac sodium (VOLTAREN) 1 % GEL Apply 4 g topically 4 times daily as needed for Pain 11/9/22   Buzz Brandon DO   traZODone (DESYREL) 50 MG tablet Take 1 tablet by mouth nightly 11/8/22   Kenya Sosa DO   donepezil (ARICEPT) 10 MG tablet 10 mg 9/28/22   Historical Provider, MD   fluticasone (FLONASE) 50 MCG/ACT nasal spray 1 spray by Each Nostril route daily 10/18/22   Kenya Sosa DO   sertraline (ZOLOFT) 50 MG tablet Take 1.5 tablets by mouth daily  Patient taking differently: Take 100 mg by mouth daily 10/18/22 1/24/23  Buzz Brandon DO   lisinopril (PRINIVIL;ZESTRIL) 20 MG tablet Take 1 tablet by mouth once daily 10/17/22   Kenya Sosa DO   atorvastatin (LIPITOR) 10 MG tablet Take 1 tablet by mouth once daily 6/20/22   Kenya Sosa DO   Handicap Placard MISC by Does not apply route 04/07/22 4/7/22   Buzz Brandon DO   diclofenac sodium 1 % GEL Apply 4 g topically 4 times daily 4/12/19   Gail Lujan MD   Cholecalciferol (VITAMIN D) 2000 units CAPS capsule Take by mouth    Historical Provider, MD   aspirin 81 MG tablet Take 81 mg by mouth daily.     Historical Provider, MD       Allergies:  Codeine, Erythromycin, and Toprol xl [metoprolol]    Social History:      The patient currently lives with family in own home with care taker support     TOBACCO:   reports that she has never smoked. She has never used smokeless tobacco.  ETOH:   reports no history of alcohol use. E-cigarette/Vaping       Questions Responses    E-cigarette/Vaping Use     Start Date     Passive Exposure     Quit Date     Counseling Given     Comments               Family History:    Reviewed and negative in regards to presenting illness/complaint. Problem Relation Age of Onset    Heart Disease Mother     Stroke Mother     High Blood Pressure Mother     Arthritis Mother     High Blood Pressure Brother        REVIEW OF SYSTEMS COMPLETED:   Pertinent positives as noted in the HPI. All other systems reviewed and negative. PHYSICAL EXAM PERFORMED:    /70   Pulse 58   Temp 98.3 °F (36.8 °C) (Oral)   Resp 16   Ht 5' 3\" (1.6 m)   Wt 113 lb (51.3 kg)   BMI 20.02 kg/m²     General appearance:  No apparent distress, appears stated age and cooperative. Dementia at baseline   HEENT:  Normal cephalic, atraumatic without obvious deformity. Pupils equal, round, and reactive to light. Extra ocular muscles intact. Conjunctivae/corneas clear. Neck: Supple, with full range of motion. No jugular venous distention. Trachea midline. Respiratory:  Normal respiratory effort. Clear to auscultation, bilaterally without Rales/Wheezes/Rhonchi. Cardiovascular:  Regular rate and rhythm with normal S1/S2 without murmurs, rubs or gallops. Abdomen: Soft, non-tender, non-distended with normal bowel sounds. Musculoskeletal:  No clubbing, cyanosis or edema bilaterally. Thumb in splint with drsg   Skin: Skin color, texture, turgor normal.  No rashes or lesions. Neurologic:  Neurovascularly intact without any focal sensory/motor deficits.  Cranial nerves: II-XII intact, grossly non-focal.  Psychiatric:  Alert and oriented to self   Capillary Refill: Brisk,3 seconds, normal  Peripheral Pulses: +2 palpable, equal bilaterally       Labs:     Recent Labs     01/24/23 0600   WBC 5.9   HGB 10.3*   HCT 31.2*        Recent Labs     01/24/23 0600      K 4.8      CO2 23   BUN 45*   CREATININE 1.8*   CALCIUM 10.2     Recent Labs     01/24/23 0600   AST 26   ALT 17   BILITOT 0.3   ALKPHOS 62     No results for input(s): INR in the last 72 hours. Recent Labs     01/24/23 0600 01/24/23 0700   CKTOTAL  --  119   TROPONINI 0.03*  --        Urinalysis:      Lab Results   Component Value Date/Time    NITRU Negative 07/31/2022 02:30 PM    WBCUA 0-2 07/31/2022 02:30 PM    BACTERIA Rare 02/18/2022 10:12 AM    RBCUA 0-2 07/31/2022 02:30 PM    BLOODU TRACE-INTACT 07/31/2022 02:30 PM    SPECGRAV 1.010 07/31/2022 02:30 PM    GLUCOSEU Negative 07/31/2022 02:30 PM       Radiology:   THREE XRAY VIEWS OF THE LEFT HAND  Complete dorsal dislocation of the IP joint of left thumb. No obvious   fracture of bones in left thumb. No additional fracture or dislocation in left hand or in the left wrist.       Advanced osteoarthritis at the PIP joint of the left 2nd finger.          EKG:    No orders to display   Sinus bradycardiaOtherwise normal ECGWhen compared with ECG of 31-JUL-2022 15:09,No significant change was foundConfirmed by Mynor Collier ([id number]) on 1/24/2023 10:35:31 AM    Consults:    IP CONSULT TO ORTHOPEDIC SURGERY    ASSESSMENT:    Active Hospital Problems    Diagnosis Date Noted    Thumb dislocation, unspecified laterality, initial encounter [S63.106A] 01/24/2023     Priority: Medium    Open dislocation of interphalangeal joint of left thumb, initial encounter [R99.982I, S61.002A] 01/24/2023     Priority: Medium         PLAN:    Unwitnessed Fall: suspect mechanical   - head ct w/o acute findings   - XR of hand, wrist, cxr, and C- spine  - PT/OT safety   - ECG was non acute and trop mild elevation     Thumb dislocation open with protruding bone   - aligned and splinted and suture   - given abx continue for 7 days   - would recommend TDAP   - ortho consulted   - pain meds     Dementia:   - cont aricept and supportive care     Elevated Trop  - no s/s of ACS, ECG non acute   - tele   - serial trop     DELMAR on ckd   - POA with crt 1.8 and elavte BUN   - IVF and monitor     HLD: cont statin     HTN: controlled monitor             DVT Prophylaxis: lovenox   Diet: regular   Code Status: Full Code    PT/OT Eval Status: ordered     Dispo - suspect return to home with care team in place        DUY Thompson - CNP    Thank you Katerina Maynard DO for the opportunity to be involved in this patient's care. If you have any questions or concerns please feel free to contact me at [phone]. Home

## 2023-02-02 ENCOUNTER — APPOINTMENT (OUTPATIENT)
Dept: OBGYN | Facility: CLINIC | Age: 73
End: 2023-02-02
Payer: MEDICARE

## 2023-02-02 VITALS
WEIGHT: 179.6 LBS | DIASTOLIC BLOOD PRESSURE: 88 MMHG | HEIGHT: 63 IN | SYSTOLIC BLOOD PRESSURE: 124 MMHG | BODY MASS INDEX: 31.82 KG/M2

## 2023-02-02 DIAGNOSIS — Z86.018 PERSONAL HISTORY OF OTHER BENIGN NEOPLASM: ICD-10-CM

## 2023-02-02 DIAGNOSIS — I26.99 OTHER PULMONARY EMBOLISM W/OUT ACUTE COR PULMONALE: ICD-10-CM

## 2023-02-02 PROCEDURE — 99213 OFFICE O/P EST LOW 20 MIN: CPT

## 2023-02-02 NOTE — HISTORY OF PRESENT ILLNESS
[Y] : Positive pregnancy history [Menarche Age: ____] : age at menarche was [unfilled] [Menopause Age: ____] : age at menopause was [unfilled] [FreeTextEntry1] : The patient continues to complain of excessive sweating.  She presents to discuss possibly trying  hormone replacement therapy.  She now gives a history of having had pulmonary emboli and is taking Xarelto. [Mammogramdate] : 08/22 [ColonoscopyDate] : 01/18 [BoneDensityDate] : 03/22 [PGxTotal] : 1 [[facility] CorporationxFulerm] : 1 [PGHxPremature] : 0 [PGHxAbortions] : 0 [[facility] Centeriving] : 1 [PGHxABInduced] : 0 [PGHxABSpont] : 0 [PGHxEctopic] : 0 [PGHxMultBirths] : 0

## 2023-02-16 ENCOUNTER — NON-APPOINTMENT (OUTPATIENT)
Age: 73
End: 2023-02-16

## 2023-02-23 ENCOUNTER — NON-APPOINTMENT (OUTPATIENT)
Age: 73
End: 2023-02-23

## 2023-03-09 ENCOUNTER — NON-APPOINTMENT (OUTPATIENT)
Age: 73
End: 2023-03-09

## 2023-03-20 ENCOUNTER — RX RENEWAL (OUTPATIENT)
Age: 73
End: 2023-03-20

## 2023-04-11 ENCOUNTER — APPOINTMENT (OUTPATIENT)
Dept: ENDOCRINOLOGY | Facility: CLINIC | Age: 73
End: 2023-04-11
Payer: MEDICARE

## 2023-04-11 VITALS — HEIGHT: 63 IN

## 2023-04-11 VITALS — OXYGEN SATURATION: 98 % | HEART RATE: 53 BPM

## 2023-04-11 VITALS — SYSTOLIC BLOOD PRESSURE: 132 MMHG | WEIGHT: 182 LBS | BODY MASS INDEX: 32.24 KG/M2 | DIASTOLIC BLOOD PRESSURE: 88 MMHG

## 2023-04-11 PROCEDURE — 99215 OFFICE O/P EST HI 40 MIN: CPT

## 2023-04-11 NOTE — PHYSICAL EXAM
[Alert] : alert [No Acute Distress] : no acute distress [Thyroid Not Enlarged] : the thyroid was not enlarged [No Thyroid Nodules] : no palpable thyroid nodules [de-identified] : cushingoid facies

## 2023-04-11 NOTE — HISTORY OF PRESENT ILLNESS
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 recently had a series of injections into back\par \par lab review:\par \par date         cortisol         ACTH\par 3/2022     1\par 4/2022     1.1              18\par 5/2022     1.2\par 9/2022     1.1               25\par 11/2022   1.2               51\par 12/2022   1.4               32\par 1/2023     1.7              113\par 2/2023     1.8               88\par 3/2023     0.8              <5\par  [de-identified] : Ms. SONAM JENSEN is a 72 year old woman here today for a follow-up visit \par \par PMHx HTN, HLD, Obesity\par She was seen in initial consultation on 7/29/2021, referred by Dr. Román Patricia. \par \par Ms. Jensen was incidentally found to have a left 1.1 cm adrenal nodule on an MRI of her lumbosacral spine. Subsequently,.she underwent an abdominal MRI in 10/2020 revealing 2 hepatic cysts, possible gallbladder wall adenomyosis, and 2 left adrenal nodules measuring 2.5 x 1.4 cm each.  This MRI was limited due to lack of IV contrast.   \par \par At the time, pt. had reported a 50 lb weight gain over the past 3 years despite not overindulging and high blood pressure despite being on 3 medications.  She sought care with Dr. Henning (Endocrinology) and underwent further workup.  \par \par MRI Abdomen w/ IV contrast performed on 5/21/2021 showed 2 adrenal adenomas (the left gland has a upper pole 7 mm nodule and a lower pole 17 x 22 mm nodule -consistent with adrenal adenomas).  Other findings include liver cysts.  \par \par Pt. Cushing's syndrome confirmed by failure of dexamethasone suppression and elevated midnight salivary cortisol. ACTH suppressed, so this is consistent with adrenal hyperfunction due to left adrenal adenoma. Advised surgical consult. \par \par BW:  Free urine cortisol (23.7- WNL); Cortisol Saliva (0.34) Metanephrine (33-L); Total Metanephrines (171- L); Normetanephrine (138- WNL);\par \par Pt reports a 50 lb weight gain over the past few years, especially in the mid section. She states she is very anxious and often feels stressed. She feels tired all the time, has muscle pains, feels weak.  Dry skin.  Increased urination. B/L leg cellulitis 2 months ago- now resolved. Osteoporosis.  Continues to have high blood pressure. States her BP was 200/101 this morning.  Reports pelvic cramping and lower abdominal discomfort, frequent urination. \par \par PMH: HTN, HLD, Obesity, Osteoporosis, s/p FELDMAN for hyperthyroidism 40 years ago, now with hypothyroidism (on Synthroid 88 mcg per day), anxiety\par PSH: Cataract surgery, MELECIO-BSO in her late 40's for ovarian cysts, benign left breast excisional biopsy, sinus surgery, tonsillectomy. \par Social Hx: Former smoker (1-1.5 ppd x 40 years, quit 2018), social alcohol use,  51 years, 1 son, 2 grandchildren. \par Family Hx: Mother with ovarian cancer in ther late 40's. Does not know paternal history. 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 No necrosis or capsular invasion seen.  No increase in mitotic figures. \par 2) Adrenal cortical adenoma (3 cm), focal capsular invasion seen, no necrosis or increase in mitotic figures. \par \par 1/20/22- Pt. called office on 1/10 with c/o N/V, constipation, inability to pass flatus, chills and sweats.  Pt. was advised to go to the ED. She was evaluated at [facility]. 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  BW was WNL.  Pt. was discharged home.  \par \par Continues to closely follow with Brissa Henning NP (edilia), has been undergoing adjustments to her hydrocortisone dosages and seeing a response in ACTH levels.\par Last labs 11/1/2022 - ACTH 51 (H), AM Cortisol 1.2 (L)\par -- advised by Endo to repeat labs in 4 weeks

## 2023-04-11 NOTE — ASSESSMENT
[FreeTextEntry1] : 1. Secondary adrenal insufficiency s/p resection of adrenal adenoma causing cushing's syndrome. Evidence of recovery of axis after about one year post-op, however recent ACTH again suppressed likely due to suspected steroid injections in back. \par Pt. advised to avoid further steroid injections if possible. Decrease AM hydrocortisone dose to 15 mg, and continue to hold pm dose; repeat in one month\par 2. Hypothyroid, clinically euthyroid. Slight TSH elevation, will repeat\par 3. troublesome menopausal symptoms. Paxil ineffective\par Unclear reason for exacerbated symptoms after adrenal surgery. Pt. will have become deficient in adrenal androgens as well, so possible that a combination of cortisol and DHEA deficiency has exacerbated menopausal symptoms. Replacing DHEA problematic, as conversion to estrogenic hormones would increase clotting diathesis, and pt. at risk for more pulmonary emboli. Data on improvement in menopausal symptoms with DHEA supplements does not reveal definite benefits.\par Hopefully symptoms would improve following recovery of normal adrenal axis.

## 2023-04-23 ENCOUNTER — EMERGENCY (EMERGENCY)
Facility: HOSPITAL | Age: 73
LOS: 1 days | Discharge: HOME | End: 2023-04-23
Attending: STUDENT IN AN ORGANIZED HEALTH CARE EDUCATION/TRAINING PROGRAM
Payer: COMMERCIAL

## 2023-04-23 VITALS
OXYGEN SATURATION: 98 % | WEIGHT: 186.07 LBS | DIASTOLIC BLOOD PRESSURE: 96 MMHG | HEART RATE: 58 BPM | SYSTOLIC BLOOD PRESSURE: 150 MMHG | HEIGHT: 64 IN | RESPIRATION RATE: 20 BRPM | TEMPERATURE: 98 F

## 2023-04-23 VITALS
HEART RATE: 62 BPM | RESPIRATION RATE: 20 BRPM | DIASTOLIC BLOOD PRESSURE: 82 MMHG | OXYGEN SATURATION: 99 % | SYSTOLIC BLOOD PRESSURE: 183 MMHG | TEMPERATURE: 98 F

## 2023-04-23 DIAGNOSIS — Z90.89 ACQUIRED ABSENCE OF OTHER ORGANS: Chronic | ICD-10-CM

## 2023-04-23 DIAGNOSIS — Z98.49 CATARACT EXTRACTION STATUS, UNSPECIFIED EYE: Chronic | ICD-10-CM

## 2023-04-23 DIAGNOSIS — E89.6 POSTPROCEDURAL ADRENOCORTICAL (-MEDULLARY) HYPOFUNCTION: Chronic | ICD-10-CM

## 2023-04-23 DIAGNOSIS — Z90.710 ACQUIRED ABSENCE OF BOTH CERVIX AND UTERUS: Chronic | ICD-10-CM

## 2023-04-23 LAB
ALBUMIN SERPL ELPH-MCNC: 4 G/DL — SIGNIFICANT CHANGE UP (ref 3.3–5.2)
ALP SERPL-CCNC: 111 U/L — SIGNIFICANT CHANGE UP (ref 40–120)
ALT FLD-CCNC: 12 U/L — SIGNIFICANT CHANGE UP
ANION GAP SERPL CALC-SCNC: 13 MMOL/L — SIGNIFICANT CHANGE UP (ref 5–17)
APPEARANCE UR: CLEAR — SIGNIFICANT CHANGE UP
AST SERPL-CCNC: 18 U/L — SIGNIFICANT CHANGE UP
BACTERIA # UR AUTO: ABNORMAL
BASOPHILS # BLD AUTO: 0.03 K/UL — SIGNIFICANT CHANGE UP (ref 0–0.2)
BASOPHILS NFR BLD AUTO: 0.4 % — SIGNIFICANT CHANGE UP (ref 0–2)
BILIRUB SERPL-MCNC: 0.3 MG/DL — LOW (ref 0.4–2)
BILIRUB UR-MCNC: NEGATIVE — SIGNIFICANT CHANGE UP
BUN SERPL-MCNC: 11.3 MG/DL — SIGNIFICANT CHANGE UP (ref 8–20)
CALCIUM SERPL-MCNC: 9.2 MG/DL — SIGNIFICANT CHANGE UP (ref 8.4–10.5)
CHLORIDE SERPL-SCNC: 106 MMOL/L — SIGNIFICANT CHANGE UP (ref 96–108)
CO2 SERPL-SCNC: 23 MMOL/L — SIGNIFICANT CHANGE UP (ref 22–29)
COLOR SPEC: YELLOW — SIGNIFICANT CHANGE UP
CREAT SERPL-MCNC: 0.52 MG/DL — SIGNIFICANT CHANGE UP (ref 0.5–1.3)
DIFF PNL FLD: ABNORMAL
EGFR: 97 ML/MIN/1.73M2 — SIGNIFICANT CHANGE UP
EOSINOPHIL # BLD AUTO: 0.05 K/UL — SIGNIFICANT CHANGE UP (ref 0–0.5)
EOSINOPHIL NFR BLD AUTO: 0.7 % — SIGNIFICANT CHANGE UP (ref 0–6)
EPI CELLS # UR: ABNORMAL
GLUCOSE SERPL-MCNC: 87 MG/DL — SIGNIFICANT CHANGE UP (ref 70–99)
GLUCOSE UR QL: NEGATIVE MG/DL — SIGNIFICANT CHANGE UP
HCT VFR BLD CALC: 48 % — HIGH (ref 34.5–45)
HGB BLD-MCNC: 15.5 G/DL — SIGNIFICANT CHANGE UP (ref 11.5–15.5)
IMM GRANULOCYTES NFR BLD AUTO: 0.3 % — SIGNIFICANT CHANGE UP (ref 0–0.9)
KETONES UR-MCNC: ABNORMAL
LEUKOCYTE ESTERASE UR-ACNC: ABNORMAL
LYMPHOCYTES # BLD AUTO: 1.72 K/UL — SIGNIFICANT CHANGE UP (ref 1–3.3)
LYMPHOCYTES # BLD AUTO: 25.7 % — SIGNIFICANT CHANGE UP (ref 13–44)
MCHC RBC-ENTMCNC: 29.9 PG — SIGNIFICANT CHANGE UP (ref 27–34)
MCHC RBC-ENTMCNC: 32.3 GM/DL — SIGNIFICANT CHANGE UP (ref 32–36)
MCV RBC AUTO: 92.7 FL — SIGNIFICANT CHANGE UP (ref 80–100)
MONOCYTES # BLD AUTO: 0.44 K/UL — SIGNIFICANT CHANGE UP (ref 0–0.9)
MONOCYTES NFR BLD AUTO: 6.6 % — SIGNIFICANT CHANGE UP (ref 2–14)
NEUTROPHILS # BLD AUTO: 4.42 K/UL — SIGNIFICANT CHANGE UP (ref 1.8–7.4)
NEUTROPHILS NFR BLD AUTO: 66.3 % — SIGNIFICANT CHANGE UP (ref 43–77)
NITRITE UR-MCNC: NEGATIVE — SIGNIFICANT CHANGE UP
PH UR: 6 — SIGNIFICANT CHANGE UP (ref 5–8)
PLATELET # BLD AUTO: 300 K/UL — SIGNIFICANT CHANGE UP (ref 150–400)
POTASSIUM SERPL-MCNC: 4.2 MMOL/L — SIGNIFICANT CHANGE UP (ref 3.5–5.3)
POTASSIUM SERPL-SCNC: 4.2 MMOL/L — SIGNIFICANT CHANGE UP (ref 3.5–5.3)
PROT SERPL-MCNC: 6.6 G/DL — SIGNIFICANT CHANGE UP (ref 6.6–8.7)
PROT UR-MCNC: NEGATIVE — SIGNIFICANT CHANGE UP
RBC # BLD: 5.18 M/UL — SIGNIFICANT CHANGE UP (ref 3.8–5.2)
RBC # FLD: 15.9 % — HIGH (ref 10.3–14.5)
RBC CASTS # UR COMP ASSIST: SIGNIFICANT CHANGE UP /HPF (ref 0–4)
SODIUM SERPL-SCNC: 142 MMOL/L — SIGNIFICANT CHANGE UP (ref 135–145)
SP GR SPEC: 1.02 — SIGNIFICANT CHANGE UP (ref 1.01–1.02)
UROBILINOGEN FLD QL: NEGATIVE MG/DL — SIGNIFICANT CHANGE UP
WBC # BLD: 6.68 K/UL — SIGNIFICANT CHANGE UP (ref 3.8–10.5)
WBC # FLD AUTO: 6.68 K/UL — SIGNIFICANT CHANGE UP (ref 3.8–10.5)
WBC UR QL: SIGNIFICANT CHANGE UP /HPF (ref 0–5)

## 2023-04-23 PROCEDURE — 93005 ELECTROCARDIOGRAM TRACING: CPT

## 2023-04-23 PROCEDURE — 82962 GLUCOSE BLOOD TEST: CPT

## 2023-04-23 PROCEDURE — 99283 EMERGENCY DEPT VISIT LOW MDM: CPT | Mod: 25

## 2023-04-23 PROCEDURE — 99284 EMERGENCY DEPT VISIT MOD MDM: CPT

## 2023-04-23 PROCEDURE — 36415 COLL VENOUS BLD VENIPUNCTURE: CPT

## 2023-04-23 PROCEDURE — 93010 ELECTROCARDIOGRAM REPORT: CPT

## 2023-04-23 PROCEDURE — 87086 URINE CULTURE/COLONY COUNT: CPT

## 2023-04-23 PROCEDURE — 85025 COMPLETE CBC W/AUTO DIFF WBC: CPT

## 2023-04-23 PROCEDURE — 80053 COMPREHEN METABOLIC PANEL: CPT

## 2023-04-23 PROCEDURE — 81001 URINALYSIS AUTO W/SCOPE: CPT

## 2023-04-23 NOTE — ED PROVIDER NOTE - PHYSICAL EXAMINATION
General: well appearing, NAD  Head: NC, AT  EENT: EOMI, no scleral icterus  Cardiac: RRR, no apparent murmurs, trace pitting edema b/l  Respiratory: CTABL, no respiratory distress   Abdomen: soft, ND, NT, nonperitonitic  MSK/Vascular: full ROM, soft compartments, warm extremities  Neuro: AAOx3, CNII-XII grossly intact, neg pronator drift, good UE + LE strength + ROM, gait grossly intact, sensation to light touch intact  Psych: calm, cooperative

## 2023-04-23 NOTE — ED ADULT NURSE NOTE - NSICDXFAMILYHX_GEN_ALL_CORE_FT
FAMILY HISTORY:  Mother  Still living? Unknown  FH: diabetes mellitus, Age at diagnosis: Age Unknown  FH: heart disease, Age at diagnosis: Age Unknown    Grandparent  Still living? Unknown  FH: diabetes mellitus, Age at diagnosis: Age Unknown  FH: heart disease, Age at diagnosis: Age Unknown     No

## 2023-04-23 NOTE — ED PROVIDER NOTE - NSFOLLOWUPINSTRUCTIONS_ED_ALL_ED_FT
1) Follow up with your doctor in 1-2 days  2) Return to the ER for worsening or concerning symptoms    Nausea, Adult    Nausea is the feeling that you have an upset stomach or that you are about to vomit. Nausea on its own is not usually a serious concern, but it may be an early sign of a more serious medical problem. As nausea gets worse, it can lead to vomiting. If vomiting develops, or if you are not able to drink enough fluids, you are at risk of becoming dehydrated. Dehydration can make you tired and thirsty, cause you to have a dry mouth, and decrease how often you urinate. Older adults and people with other diseases or a weak disease-fighting system (immune system) are at higher risk for dehydration. The main goals of treating your nausea are:    To relieve your nausea.  To limit repeated nausea episodes.  To prevent vomiting and dehydration.    Follow these instructions at home:  Watch your symptoms for any changes. Tell your health care provider about them. Follow these instructions as told by your health care provider.        Eating and drinking      Take an oral rehydration solution (ORS). This is a drink that is sold at pharmacies and retail stores.  Drink clear fluids slowly and in small amounts as you are able. Clear fluids include water, ice chips, low-calorie sports drinks, and fruit juice that has water added (diluted fruit juice).  Eat bland, easy-to-digest foods in small amounts as you are able. These foods include bananas, applesauce, rice, lean meats, toast, and crackers.  Avoid drinking fluids that contain a lot of sugar or caffeine, such as energy drinks, sports drinks, and soda.  Avoid alcohol.  Avoid spicy or fatty foods.        General instructions    Take over-the-counter and prescription medicines only as told by your health care provider.  Rest at home while you recover.  Drink enough fluid to keep your urine pale yellow.  Breathe slowly and deeply when you feel nauseous.  Avoid smelling things that have strong odors.  Wash your hands often using soap and water. If soap and water are not available, use hand .  Make sure that all people in your household wash their hands well and often.  Keep all follow-up visits as told by your health care provider. This is important.    Contact a health care provider if:  Your nausea gets worse.  Your nausea does not go away after two days.  You vomit.  You cannot drink fluids without vomiting.  You have any of the following:    New symptoms.  A fever.  A headache.  Muscle cramps.  A rash.  Pain while urinating.  You feel light-headed or dizzy.    Get help right away if:  You have pain in your chest, neck, arm, or jaw.  You feel extremely weak or you faint.  You have vomit that is bright red or looks like coffee grounds.  You have bloody or black stools or stools that look like tar.  You have a severe headache, a stiff neck, or both.  You have severe pain, cramping, or bloating in your abdomen.  You have difficulty breathing or are breathing very quickly.  Your heart is beating very quickly.  Your skin feels cold and clammy.  You feel confused.  You have signs of dehydration, such as:    Dark urine, very little urine, or no urine.  Cracked lips.  Dry mouth.  Sunken eyes.  Sleepiness.  Weakness.    These symptoms may represent a serious problem that is an emergency. Do not wait to see if the symptoms will go away. Get medical help right away. Call your local emergency services (911 in the [place] Do not drive yourself to the hospital.    Summary  Nausea is the feeling that you have an upset stomach or that you are about to vomit. Nausea on its own is not usually a serious concern, but it may be an early sign of a more serious medical problem.  If vomiting develops, or if you are not able to drink enough fluids, you are at risk of becoming dehydrated.  Follow recommendations for eating and drinking and take over-the-counter and prescription medicines only as told by your health care provider.  Contact a health care provider right away if your symptoms worsen or you have new symptoms.  Keep all follow-up visits as told by your health care provider. This is important.    Tension Headache, Adult    A tension headache is a feeling of pain, pressure, or aching in the head that is often felt over the front and sides of the head. The pain can be dull, or it can feel tight (constricting). There are two types of tension headache:    Episodic tension headache. This is when the headaches happen fewer than 15 days a month.  Chronic tension headache. This is when the headaches happen more than 15 days a month during a 3-month period.    A tension headache can last from 30 minutes to several days. It is the most common kind of headache. Tension headaches are not normally associated with nausea or vomiting, and they do not get worse with physical activity.    What are the causes?  The exact cause of this condition is not known. Tension headaches are often triggered by stress, anxiety, or depression. Other triggers include:    Alcohol.  Too much caffeine or caffeine withdrawal.  Respiratory infections, such as colds, flu, or sinus infections.  Dental problems or teeth clenching.  Tiredness (fatigue).  Holding your head and neck in the same position for a long period of time, such as while using a computer.  Smoking.  Arthritis of the neck.    What are the signs or symptoms?  Symptoms of this condition include:    A feeling of pressure or tightness around the head.  Dull, aching head pain.  Pain over the front and sides of the head.  Tenderness in the muscles of the head, neck, and shoulders.    How is this diagnosed?  This condition may be diagnosed based on your symptoms, your medical history, and a physical exam. If your symptoms are severe or unusual, you may have imaging tests, such as a CT scan or an MRI of your head. Your vision may also be checked.    How is this treated?  This condition may be treated with lifestyle changes and with medicines that help relieve symptoms.    Follow these instructions at home:      Managing pain    Take over-the-counter and prescription medicines only as told by your health care provider.  When you have a headache, lie down in a dark, quiet room.  If directed, apply ice to the head and neck:    Put ice in a plastic bag.  Place a towel between your skin and the bag.  Leave the ice on for 20 minutes, 2–3 times a day.  If directed, apply heat to the back of your neck as often as told by your health care provider. Use the heat source that your health care provider recommends, such as a moist heat pack or a heating pad.    Place a towel between your skin and the heat source.  Leave the heat on for 20–30 minutes.  Remove the heat if your skin turns bright red. This is especially important if you are unable to feel pain, heat, or cold. You may have a greater risk of getting burned.        Eating and drinking    Eat meals on a regular schedule.  Limit alcohol intake to no more than 1 drink a day for nonpregnant women and 2 drinks a day for men. One drink equals 12 oz of beer, 5 oz of wine, or 1½ oz of hard liquor.  Drink enough fluid to keep your urine pale yellow.  Decrease your caffeine intake, or stop using caffeine.        Lifestyle    Get 7–9 hours of sleep each night, or get the amount of sleep recommended by your health care provider.  At bedtime, remove all electronic devices from your room. Electronic devices include computers, phones, and tablets.  Find ways to manage your stress. Some things that can help relieve stress include:    Exercise.  Deep breathing exercises.  Yoga.  Listening to music.  Positive mental imagery.  Try to sit up straight and avoid tensing your muscles.  Do not use any products that contain nicotine or tobacco, such as cigarettes and e-cigarettes. If you need help quitting, ask your health care provider.        General instructions     Keep all follow-up visits as told by your health care provider. This is important.  Avoid any headache triggers. Keep a headache journal to help find out what may trigger your headaches. For example, write down:    What you eat and drink.  How much sleep you get.  Any change to your diet or medicines.    Contact a health care provider if:  Your headache does not get better.  Your headache comes back.  You are sensitive to sounds, light, or smells because of a headache.  You have nausea or you vomit.  Your stomach hurts.    Get help right away if:  You suddenly develop a very severe headache along with any of the following:    A stiff neck.  Nausea and vomiting.  Confusion.  Weakness.  Double vision or loss of vision.  Shortness of breath.  Rash.  Unusual sleepiness.  Fever.  Trouble speaking.  Pain in your eyes or ears.  Trouble walking or balancing.  Feeling faint or passing out.    Summary  A tension headache is a feeling of pain, pressure, or aching in the head that is often felt over the front and sides of the head.  A tension headache can last from 30 minutes to several days. It is the most common kind of headache.  This condition may be diagnosed based on your symptoms, your medical history, and a physical exam.  This condition may be treated with lifestyle changes and with medicines that help relieve symptoms.

## 2023-04-23 NOTE — ED PROVIDER NOTE - OBJECTIVE STATEMENT
73 y/o female w/PMHx of HTN, Adrenal Insufficiency, HLD, and Hypothyroidism presents from [facility] w/3 days of intermittent lightheadedness, nausea, hot flashes, and headaches with one episode of vomiting 3d ago as well. Pt saw [facility], sent to ER due to concern for elevated BPs (180s in the office, 150/96 in triage). Recently decreased Hydrocortisone dose to 15mg from 20mg, adrenal insufficiency started after she had an adrenal tumor removed 1.5 years ago. Also c/o urinary frequency and odor. Denies fever/chills, CP, sob, and abd pain.

## 2023-04-23 NOTE — ED PROVIDER NOTE - CLINICAL SUMMARY MEDICAL DECISION MAKING FREE TEXT BOX
73 y/o female w/3 days of lightheaded, nausea, vomiting. Concern for adrenal insufficiency given change in hydrocortisone medication. Will check electrolytes to r/o hyperkalemia or hyponatremia, check kidney functions. UA sent as pt had some urinary complaints. Reassess after labs.

## 2023-04-23 NOTE — ED PROVIDER NOTE - PATIENT PORTAL LINK FT
You can access the FollowMyHealth Patient Portal offered by [facility] by registering at the following website: [email or web address]. By joining [employer]’s FollowMyHealth portal, you will also be able to view your health information using other applications (apps) compatible with our system.

## 2023-04-23 NOTE — ED PROVIDER NOTE - ATTENDING CONTRIBUTION TO CARE
I personally saw the patient with the resident, and completed the key components of the history and physical exam. I then discussed the management plan with the resident.    71 y/o F with PMH HTN, adrenal insufficiency, HLD presents for 3 days of intermittent lightheadedness, nausea, HA's and an episode of vomiting after drinking a grape drink. She went to urgent care who sent her to ED for HTN. She recently had her hydrocortisone decreased from 20 to 15 mg daily.    PE - NAD, well appearing, RRR, no respiratory distress, abd soft NT/ND, no rashes, no LE edema, A&O x 3, no focal neuro deficits.    EKG non-ischemic, labs - reassess.

## 2023-04-23 NOTE — ED ADULT NURSE NOTE - OBJECTIVE STATEMENT
received report from PADMINI montaño, PT is A&Ox4, PT resting comfortably in Ed stretcher with out complaints of chest pain or shortness of breath, skin warm dry and unremarkable. Normal sinus on bedside monitor Updated pt on plan of care and pt expresses understanding.

## 2023-04-23 NOTE — ED ADULT NURSE REASSESSMENT NOTE - NS ED NURSE REASSESS COMMENT FT1
received report from PADMINI Hickey, PT is A&Ox4, PT resting comfortably in Ed stretcher with out complaints of chest pain normal sinus on bedside monitor. denies shortness of breath, skin warm dry and unremarkable. Updated pt on plan of care and pt expresses understanding.

## 2023-04-23 NOTE — ED PROVIDER NOTE - PROGRESS NOTE DETAILS
Patient received in sign-out from Dr. Sellers pending labs. Patient resting comfortably. She states that she has not had a headache since yesterday and only one episode of vomiting which was last Friday. Patient resting comfortably.

## 2023-04-25 LAB
CULTURE RESULTS: SIGNIFICANT CHANGE UP
SPECIMEN SOURCE: SIGNIFICANT CHANGE UP

## 2023-05-10 ENCOUNTER — EMERGENCY (EMERGENCY)
Facility: HOSPITAL | Age: 73
LOS: 1 days | Discharge: HOME | End: 2023-05-10
Attending: EMERGENCY MEDICINE
Payer: COMMERCIAL

## 2023-05-10 VITALS
DIASTOLIC BLOOD PRESSURE: 63 MMHG | TEMPERATURE: 98 F | SYSTOLIC BLOOD PRESSURE: 119 MMHG | RESPIRATION RATE: 18 BRPM | WEIGHT: 184.97 LBS | OXYGEN SATURATION: 98 % | HEART RATE: 59 BPM | HEIGHT: 64 IN

## 2023-05-10 DIAGNOSIS — Z98.49 CATARACT EXTRACTION STATUS, UNSPECIFIED EYE: Chronic | ICD-10-CM

## 2023-05-10 DIAGNOSIS — E89.6 POSTPROCEDURAL ADRENOCORTICAL (-MEDULLARY) HYPOFUNCTION: Chronic | ICD-10-CM

## 2023-05-10 DIAGNOSIS — Z90.710 ACQUIRED ABSENCE OF BOTH CERVIX AND UTERUS: Chronic | ICD-10-CM

## 2023-05-10 DIAGNOSIS — Z90.89 ACQUIRED ABSENCE OF OTHER ORGANS: Chronic | ICD-10-CM

## 2023-05-10 PROCEDURE — 99285 EMERGENCY DEPT VISIT HI MDM: CPT

## 2023-05-10 PROCEDURE — 93010 ELECTROCARDIOGRAM REPORT: CPT

## 2023-05-10 NOTE — ED PROVIDER NOTE - PROGRESS NOTE DETAILS
2nd set CE neg.  Pt stable cor d/c with Cardiology f/u with Dr. Tran as an outpt 2nd set CE neg.  CXR neg.  Pt improved with meds and stable for d/c. Pt stable cor d/c with Cardiology f/u with Dr. Tran as an outpt

## 2023-05-10 NOTE — ED ADULT TRIAGE NOTE - CHIEF COMPLAINT QUOTE
Pt c/o non radiating intermittent mid substernal chest pain yesterday night. PMH: HTN; reported arrythmia during her last ED visis

## 2023-05-10 NOTE — ED PROVIDER NOTE - ATTENDING CONTRIBUTION TO CARE
71 yo F with hx HTN, adrenal insufficiency s/p removal of adermal mass, HLD and b/l PE's on Xarelto c/o intermittent mid sternal CP lasting seconds since yesterday, occ worsens with movement and palpation.   no assoc SOB, diaphoresis, N/V, syncope or focal deficits.  EKG sinus janie with no acute changes .  On exam awake and alert in NAD, PERRL, mm moist, Neck supple, Cor Reg, Lungs clear b/l, abd soft, NT, Ext FROM, Neuro non-focal.  Will monitor, check labs, serial CE, CXR and re-eval

## 2023-05-10 NOTE — ED PROVIDER NOTE - CARE PROVIDER_API CALL
Alicia Tran)  Cardiac Electrophysiology; Cardiovascular Disease; Internal Medicine  [address] [place] [address]  Phone: [phone]  Fax: [phone]  Follow Up Time:

## 2023-05-10 NOTE — ED PROVIDER NOTE - PATIENT PORTAL LINK FT
You can access the FollowMyHealth Patient Portal offered by [facility] by registering at the following website: [email or web address]. By joining [employer]’s FollowMyHealth portal, you will also be able to view your health information using other applications (apps) compatible with our system.

## 2023-05-10 NOTE — ED ADULT NURSE NOTE - NSFALLHARMRISKINTERV_ED_ALL_ED

## 2023-05-10 NOTE — ED ADULT NURSE NOTE - NSFALLRISKFACTORS_ED_ALL_ED
Coagulation: Bleeding disorder either through use of anticoagulants or underlying clinical condition(s)

## 2023-05-10 NOTE — ED PROVIDER NOTE - OBJECTIVE STATEMENT
71 YO female PMH HTN, Adrenal Insufficiency, HLD, B/L PE's on Xarelto, and Hypothyroidism presents from home w/ 24 hours of intermittent chest pain lasting <15 seconds at a time. Pt recently decreased Hydrocortisone dose to 15mg from 20mg, adrenal insufficiency started after she had an adrenal tumor removed 1.5 years ago. Additionally patient notes recent ED visit for HTN leading to an increase amlodipine from 2.5mg daily to 5mg daily.  Pt reports in between episodes of chest pains pain has no complaints. Patient denies SOB, fever, chills, known sick contacts, focal motor deficits. EKG reviewed.

## 2023-05-10 NOTE — ED PROVIDER NOTE - CLINICAL SUMMARY MEDICAL DECISION MAKING FREE TEXT BOX
71 YO female PMH HTN, Adrenal Insufficiency, HLD, B/L PE's on Xarelto, and Hypothyroidism presents from home w/ 24 hours of intermittent chest pain lasting <15 seconds at a time.  Pt reports in between episodes of chest pains pain has no complaints. Patient denies SOB, fever, chills, known sick contacts, focal motor deficits. EKG reviewed, cardiac work up ordered.

## 2023-05-11 VITALS
OXYGEN SATURATION: 98 % | HEART RATE: 54 BPM | TEMPERATURE: 98 F | DIASTOLIC BLOOD PRESSURE: 78 MMHG | SYSTOLIC BLOOD PRESSURE: 137 MMHG | RESPIRATION RATE: 20 BRPM

## 2023-05-11 LAB
ALBUMIN SERPL ELPH-MCNC: 3.4 G/DL — SIGNIFICANT CHANGE UP (ref 3.3–5.2)
ALP SERPL-CCNC: 105 U/L — SIGNIFICANT CHANGE UP (ref 40–120)
ALT FLD-CCNC: 13 U/L — SIGNIFICANT CHANGE UP
ANION GAP SERPL CALC-SCNC: 14 MMOL/L — SIGNIFICANT CHANGE UP (ref 5–17)
AST SERPL-CCNC: 23 U/L — SIGNIFICANT CHANGE UP
BILIRUB SERPL-MCNC: <0.2 MG/DL — LOW (ref 0.4–2)
BUN SERPL-MCNC: 18.4 MG/DL — SIGNIFICANT CHANGE UP (ref 8–20)
CALCIUM SERPL-MCNC: 9 MG/DL — SIGNIFICANT CHANGE UP (ref 8.4–10.5)
CHLORIDE SERPL-SCNC: 106 MMOL/L — SIGNIFICANT CHANGE UP (ref 96–108)
CK SERPL-CCNC: 33 U/L — SIGNIFICANT CHANGE UP (ref 25–170)
CO2 SERPL-SCNC: 19 MMOL/L — LOW (ref 22–29)
CREAT SERPL-MCNC: 0.59 MG/DL — SIGNIFICANT CHANGE UP (ref 0.5–1.3)
EGFR: 95 ML/MIN/1.73M2 — SIGNIFICANT CHANGE UP
GLUCOSE SERPL-MCNC: 88 MG/DL — SIGNIFICANT CHANGE UP (ref 70–99)
HCT VFR BLD CALC: 44.6 % — SIGNIFICANT CHANGE UP (ref 34.5–45)
HGB BLD-MCNC: 14.4 G/DL — SIGNIFICANT CHANGE UP (ref 11.5–15.5)
MCHC RBC-ENTMCNC: 30.2 PG — SIGNIFICANT CHANGE UP (ref 27–34)
MCHC RBC-ENTMCNC: 32.3 GM/DL — SIGNIFICANT CHANGE UP (ref 32–36)
MCV RBC AUTO: 93.5 FL — SIGNIFICANT CHANGE UP (ref 80–100)
PLATELET # BLD AUTO: 309 K/UL — SIGNIFICANT CHANGE UP (ref 150–400)
POTASSIUM SERPL-MCNC: 5 MMOL/L — SIGNIFICANT CHANGE UP (ref 3.5–5.3)
POTASSIUM SERPL-SCNC: 5 MMOL/L — SIGNIFICANT CHANGE UP (ref 3.5–5.3)
PROT SERPL-MCNC: 6 G/DL — LOW (ref 6.6–8.7)
RBC # BLD: 4.77 M/UL — SIGNIFICANT CHANGE UP (ref 3.8–5.2)
RBC # FLD: 15.2 % — HIGH (ref 10.3–14.5)
SODIUM SERPL-SCNC: 139 MMOL/L — SIGNIFICANT CHANGE UP (ref 135–145)
T3 SERPL-MCNC: 88 NG/DL — SIGNIFICANT CHANGE UP (ref 80–200)
T4 AB SER-ACNC: 7.9 UG/DL — SIGNIFICANT CHANGE UP (ref 4.5–12)
T4 FREE SERPL-MCNC: 1.3 NG/DL — SIGNIFICANT CHANGE UP (ref 0.9–1.8)
TROPONIN T SERPL-MCNC: <0.01 NG/ML — SIGNIFICANT CHANGE UP (ref 0–0.06)
TROPONIN T SERPL-MCNC: <0.01 NG/ML — SIGNIFICANT CHANGE UP (ref 0–0.06)
TSH SERPL-MCNC: 1.65 UIU/ML — SIGNIFICANT CHANGE UP (ref 0.27–4.2)
WBC # BLD: 6.68 K/UL — SIGNIFICANT CHANGE UP (ref 3.8–10.5)
WBC # FLD AUTO: 6.68 K/UL — SIGNIFICANT CHANGE UP (ref 3.8–10.5)

## 2023-05-11 PROCEDURE — 99285 EMERGENCY DEPT VISIT HI MDM: CPT | Mod: 25

## 2023-05-11 PROCEDURE — 84436 ASSAY OF TOTAL THYROXINE: CPT

## 2023-05-11 PROCEDURE — 84439 ASSAY OF FREE THYROXINE: CPT

## 2023-05-11 PROCEDURE — 36415 COLL VENOUS BLD VENIPUNCTURE: CPT

## 2023-05-11 PROCEDURE — 71045 X-RAY EXAM CHEST 1 VIEW: CPT | Mod: 26

## 2023-05-11 PROCEDURE — 82550 ASSAY OF CK (CPK): CPT

## 2023-05-11 PROCEDURE — 84480 ASSAY TRIIODOTHYRONINE (T3): CPT

## 2023-05-11 PROCEDURE — 93005 ELECTROCARDIOGRAM TRACING: CPT

## 2023-05-11 PROCEDURE — 80053 COMPREHEN METABOLIC PANEL: CPT

## 2023-05-11 PROCEDURE — 71045 X-RAY EXAM CHEST 1 VIEW: CPT

## 2023-05-11 PROCEDURE — 84443 ASSAY THYROID STIM HORMONE: CPT

## 2023-05-11 PROCEDURE — 85027 COMPLETE CBC AUTOMATED: CPT

## 2023-05-11 PROCEDURE — 96374 THER/PROPH/DIAG INJ IV PUSH: CPT

## 2023-05-11 PROCEDURE — 84484 ASSAY OF TROPONIN QUANT: CPT

## 2023-05-11 RX ORDER — PANTOPRAZOLE SODIUM 20 MG/1
40 TABLET, DELAYED RELEASE ORAL ONCE
Refills: 0 | Status: COMPLETED | OUTPATIENT
Start: 2023-05-11 | End: 2023-05-11

## 2023-05-11 RX ORDER — ACETAMINOPHEN 500 MG
975 TABLET ORAL ONCE
Refills: 0 | Status: COMPLETED | OUTPATIENT
Start: 2023-05-11 | End: 2023-05-11

## 2023-05-11 RX ADMIN — Medication 30 MILLILITER(S): at 04:45

## 2023-05-11 RX ADMIN — PANTOPRAZOLE SODIUM 40 MILLIGRAM(S): 20 TABLET, DELAYED RELEASE ORAL at 04:45

## 2023-05-11 RX ADMIN — Medication 975 MILLIGRAM(S): at 01:59

## 2023-05-11 RX ADMIN — Medication 975 MILLIGRAM(S): at 02:20

## 2023-05-16 NOTE — H&P PST ADULT - NSICDXPASTSURGICALHX_GEN_ALL_CORE_FT
PA/NP only visit PAST SURGICAL HISTORY:  H/O: hysterectomy     History of tonsillectomy     S/P cataract surgery

## 2023-06-20 ENCOUNTER — NON-APPOINTMENT (OUTPATIENT)
Age: 73
End: 2023-06-20

## 2023-06-23 ENCOUNTER — RX RENEWAL (OUTPATIENT)
Age: 73
End: 2023-06-23

## 2023-07-17 ENCOUNTER — RX RENEWAL (OUTPATIENT)
Age: 73
End: 2023-07-17

## 2023-07-21 RX ORDER — HYDROCORTISONE 5 MG/1
5 TABLET ORAL
Qty: 270 | Refills: 3 | Status: ACTIVE | COMMUNITY
Start: 2022-06-27 | End: 1900-01-01

## 2023-08-01 ENCOUNTER — APPOINTMENT (OUTPATIENT)
Dept: ENDOCRINOLOGY | Facility: CLINIC | Age: 73
End: 2023-08-01
Payer: MEDICARE

## 2023-08-01 VITALS
SYSTOLIC BLOOD PRESSURE: 120 MMHG | WEIGHT: 179 LBS | BODY MASS INDEX: 31.71 KG/M2 | DIASTOLIC BLOOD PRESSURE: 72 MMHG | HEIGHT: 63 IN | HEART RATE: 63 BPM | OXYGEN SATURATION: 98 %

## 2023-08-01 PROCEDURE — 99214 OFFICE O/P EST MOD 30 MIN: CPT

## 2023-10-24 NOTE — PATIENT PROFILE ADULT - HOW PATIENT ADDRESSED, PROFILE
Nutrition Note:   The patient is a 69 y.o. male who is hospital day #6.    Reason for Assessment: Tube feeding recommendations - patient's nutrition recs need to be modified for bolus feeding for homegoing.     TF was started on 10/21 and on 10/23 AM they were running at 40 ml/hr. Currently (10/24 AM), per RN, TFs are running at 50 ml/hr and pt is tolerating adequately.   Pt was seen by SLP on 10/23 and cleared for a regular diet w/ easy to chew foods and thin liquids. Orders placed for minced/moist w/ thin liquids. Pt so far has ordered soup and peaches.     Objective Data:    Last BM Date: 10/24/23    Nutrition Significant Labs:  Results from last 7 days   Lab Units 10/24/23  0724 10/24/23  0454 10/23/23  0719 10/22/23  1655 10/22/23  0959   GLUCOSE mg/dL 92 95 92 101* 95   SODIUM mmol/L 135* 135* 135* 135* 137   POTASSIUM mmol/L 4.1 4.2 4.3 4.8 4.1   PHOSPHORUS mg/dL 3.4  --  2.8 3.1 3.3   MAGNESIUM mg/dL 1.71 1.62 1.84 2.39 1.86       Nutrition Specific Mediations:    magnesium oxide (Mag-Ox) tablet 400 mg, 400 mg, g-tube, Daily, Michael Rosa MD, 400 mg at 10/24/23 0843    ondansetron (Zofran) tablet 8 mg, 8 mg, oral, q8h PRN, Vianca Bruno MD    [START ON 10/25/2023] pantoprazole (ProtoNix) EC tablet 40 mg, 40 mg, oral, Daily, Michael Rosa MD    thiamine (Vitamin B1) injection 100 mg, 100 mg, intravenous, Daily - stop 10/28 (After 7 days)    I/O:     Intake/Output Summary (Last 24 hours) at 10/24/2023 1019  Last data filed at 10/24/2023 0205  Gross per 24 hour   Intake 971 ml   Output 625 ml   Net 346 ml       Dietary Orders (From admission, onward)       Start     Ordered    10/23/23 1455  Adult diet Regular; Minced/moist food 5; Thin 0  Diet effective now        Question Answer Comment   Diet type Regular    Texture Minced/moist food 5    Fluid consistency Thin 0        10/23/23 1456    10/21/23 1206  Enteral feeding with diet  Continuous        Comments: Place additional diet order to ensure  patient receives meal tray.    Question Answer Comment   Tube feeding formula: Isosource 1.5    Tube feeding continuous rate (mL/hr): 10 increase rate by 10 ml every 10 hours for goal of 50/ml/hr   Tube feeding flush (mL): 200    Flush type: Water    Water type: Bottled water    Flush frequency: Other (specify)    Additional Details Every 8 hours        10/21/23 1210                     Estimated Needs:   Total Energy Estimated Needs (kCal): 1700 kCal  Method for Estimating Needs: MSJ= 1306 x 1.3; ABW x 35  Daily kcal needs range: 3581-7937    Total Protein Estimated Needs (g): 65 g  Method for Estimating Needs: ABW x 1.2-1.5  Daily protein needs range: 65-80    Total Fluid Estimated Needs (mL):  (per team)  Method for Estimating Needs: per team     Nutrition Diagnosis:  Malnutrition Diagnosis  Patient has Malnutrition Diagnosis: Yes  Diagnosis Status: New  Malnutrition Diagnosis: Severe malnutrition related to starvation  As Evidenced by: <=50% of estimated energy requirement for >= 1 month; >10% wt loss x ~ 6months; >7.5% wt loss x ~ 3 months;    (9% wt loss x ~ 2 weeks; 19% wt loss x ~ 2 months; 24.7% wt loss x ~ 5 months) BMI = 17.08    Nutrition Interventions and Recommendations:        Nutrition Prescription:  Individualized Nutrition Prescription Provided for : Isosource 1.5 bolus @310 ml x4/days (5 cans/d) + FWF: 60 ml pre/post bolus feed w/ additional 200 ml BID        Nutrition Recommendation Details:   When Isosource 1.5 has been running at 50 ml/hr for at least 24 hrs and no major changes in lytes or BG occur then can transition to bolus. Recommend the following:  Isosource 1.5 bolus @310 ml x4/d (5 cartons/d)  FWF: 60 ml pre/post bolus feeds w/ additional 200 ml BID   This regimen provides: 1240 ml, 1860 kcal, 84g protein, 1827 ml free water   Goal: transition to bolus feeds    Continue with 100mg IV thiamine for a total of 7 days (stop 10/28 or at discharge)    Coordination of Nutrition Care by a  Nutrition Professional  Collaboration and Referral of Nutrition Care: Team meeting involving nutrition professional   [place]

## 2023-11-21 ENCOUNTER — APPOINTMENT (OUTPATIENT)
Dept: ENDOCRINOLOGY | Facility: CLINIC | Age: 73
End: 2023-11-21
Payer: MEDICARE

## 2023-11-21 VITALS — WEIGHT: 182 LBS | HEIGHT: 63 IN | BODY MASS INDEX: 32.25 KG/M2

## 2023-11-21 VITALS — HEART RATE: 88 BPM | DIASTOLIC BLOOD PRESSURE: 78 MMHG | SYSTOLIC BLOOD PRESSURE: 128 MMHG | OXYGEN SATURATION: 5 %

## 2023-11-21 DIAGNOSIS — E89.6 POSTPROCEDURAL ADRENOCORTICAL (-MEDULLARY) HYPOFUNCTION: ICD-10-CM

## 2023-11-21 DIAGNOSIS — R61 GENERALIZED HYPERHIDROSIS: ICD-10-CM

## 2023-11-21 PROCEDURE — 99214 OFFICE O/P EST MOD 30 MIN: CPT

## 2023-11-21 RX ORDER — LEVOTHYROXINE SODIUM 100 UG/1
100 TABLET ORAL
Qty: 90 | Refills: 0 | Status: ACTIVE | COMMUNITY
Start: 2022-09-13 | End: 1900-01-01

## 2023-11-27 ENCOUNTER — APPOINTMENT (OUTPATIENT)
Dept: UROLOGY | Facility: CLINIC | Age: 73
End: 2023-11-27
Payer: MEDICARE

## 2023-11-27 VITALS
SYSTOLIC BLOOD PRESSURE: 127 MMHG | HEART RATE: 85 BPM | DIASTOLIC BLOOD PRESSURE: 85 MMHG | WEIGHT: 180 LBS | BODY MASS INDEX: 31.89 KG/M2 | HEIGHT: 63 IN

## 2023-11-27 DIAGNOSIS — R82.90 UNSPECIFIED ABNORMAL FINDINGS IN URINE: ICD-10-CM

## 2023-11-27 DIAGNOSIS — R10.9 UNSPECIFIED ABDOMINAL PAIN: ICD-10-CM

## 2023-11-27 LAB
BILIRUB UR QL STRIP: ABNORMAL
CLARITY UR: CLEAR
COLLECTION METHOD: NORMAL
GLUCOSE UR-MCNC: NORMAL
HCG UR QL: 0.2 EU/DL
HGB UR QL STRIP.AUTO: NORMAL
KETONES UR-MCNC: NORMAL
LEUKOCYTE ESTERASE UR QL STRIP: ABNORMAL
NITRITE UR QL STRIP: NORMAL
PH UR STRIP: 5.5
PROT UR STRIP-MCNC: ABNORMAL
SP GR UR STRIP: 1.03

## 2023-11-27 PROCEDURE — 81003 URINALYSIS AUTO W/O SCOPE: CPT | Mod: QW

## 2023-11-27 PROCEDURE — 99203 OFFICE O/P NEW LOW 30 MIN: CPT

## 2023-11-27 PROCEDURE — 99213 OFFICE O/P EST LOW 20 MIN: CPT

## 2023-11-27 RX ORDER — RIVAROXABAN 20 MG/1
20 TABLET, FILM COATED ORAL
Refills: 0 | Status: COMPLETED | COMMUNITY
End: 2023-11-27

## 2023-11-27 RX ORDER — FUROSEMIDE 40 MG/1
40 TABLET ORAL
Refills: 0 | Status: COMPLETED | COMMUNITY
End: 2023-11-27

## 2023-11-29 LAB — BACTERIA UR CULT: NORMAL

## 2023-12-11 ENCOUNTER — APPOINTMENT (OUTPATIENT)
Dept: UROLOGY | Facility: CLINIC | Age: 73
End: 2023-12-11

## 2024-02-10 ENCOUNTER — INPATIENT (INPATIENT)
Facility: HOSPITAL | Age: 74
LOS: 2 days | Discharge: HOME | DRG: 872 | End: 2024-02-13
Attending: INTERNAL MEDICINE | Admitting: INTERNAL MEDICINE
Payer: COMMERCIAL

## 2024-02-10 VITALS
HEART RATE: 126 BPM | OXYGEN SATURATION: 95 % | TEMPERATURE: 101 F | RESPIRATION RATE: 20 BRPM | HEIGHT: 64 IN | WEIGHT: 160.06 LBS

## 2024-02-10 DIAGNOSIS — Z98.49 CATARACT EXTRACTION STATUS, UNSPECIFIED EYE: Chronic | ICD-10-CM

## 2024-02-10 DIAGNOSIS — Z90.710 ACQUIRED ABSENCE OF BOTH CERVIX AND UTERUS: Chronic | ICD-10-CM

## 2024-02-10 DIAGNOSIS — Z90.89 ACQUIRED ABSENCE OF OTHER ORGANS: Chronic | ICD-10-CM

## 2024-02-10 DIAGNOSIS — A08.4 VIRAL INTESTINAL INFECTION, UNSPECIFIED: ICD-10-CM

## 2024-02-10 DIAGNOSIS — E89.6 POSTPROCEDURAL ADRENOCORTICAL (-MEDULLARY) HYPOFUNCTION: Chronic | ICD-10-CM

## 2024-02-10 LAB
ALBUMIN SERPL ELPH-MCNC: 3.7 G/DL — SIGNIFICANT CHANGE UP (ref 3.3–5.2)
ALP SERPL-CCNC: 108 U/L — SIGNIFICANT CHANGE UP (ref 40–120)
ALT FLD-CCNC: 23 U/L — SIGNIFICANT CHANGE UP
ANION GAP SERPL CALC-SCNC: 13 MMOL/L — SIGNIFICANT CHANGE UP (ref 5–17)
APPEARANCE UR: CLEAR — SIGNIFICANT CHANGE UP
APTT BLD: 24.3 SEC — LOW (ref 24.5–35.6)
AST SERPL-CCNC: 32 U/L — HIGH
BACTERIA # UR AUTO: NEGATIVE /HPF — SIGNIFICANT CHANGE UP
BASOPHILS # BLD AUTO: 0.02 K/UL — SIGNIFICANT CHANGE UP (ref 0–0.2)
BASOPHILS NFR BLD AUTO: 0.3 % — SIGNIFICANT CHANGE UP (ref 0–2)
BILIRUB SERPL-MCNC: 0.4 MG/DL — SIGNIFICANT CHANGE UP (ref 0.4–2)
BILIRUB UR-MCNC: NEGATIVE — SIGNIFICANT CHANGE UP
BUN SERPL-MCNC: 13.4 MG/DL — SIGNIFICANT CHANGE UP (ref 8–20)
CALCIUM SERPL-MCNC: 9.1 MG/DL — SIGNIFICANT CHANGE UP (ref 8.4–10.5)
CAST: 0 /LPF — SIGNIFICANT CHANGE UP (ref 0–4)
CHLORIDE SERPL-SCNC: 102 MMOL/L — SIGNIFICANT CHANGE UP (ref 96–108)
CO2 SERPL-SCNC: 23 MMOL/L — SIGNIFICANT CHANGE UP (ref 22–29)
COLOR SPEC: YELLOW — SIGNIFICANT CHANGE UP
CREAT SERPL-MCNC: 0.69 MG/DL — SIGNIFICANT CHANGE UP (ref 0.5–1.3)
DIFF PNL FLD: NEGATIVE — SIGNIFICANT CHANGE UP
EGFR: 90 ML/MIN/1.73M2 — SIGNIFICANT CHANGE UP
EOSINOPHIL # BLD AUTO: 0.01 K/UL — SIGNIFICANT CHANGE UP (ref 0–0.5)
EOSINOPHIL NFR BLD AUTO: 0.2 % — SIGNIFICANT CHANGE UP (ref 0–6)
GLUCOSE SERPL-MCNC: 103 MG/DL — HIGH (ref 70–99)
GLUCOSE UR QL: NEGATIVE MG/DL — SIGNIFICANT CHANGE UP
HCT VFR BLD CALC: 45.8 % — HIGH (ref 34.5–45)
HGB BLD-MCNC: 15.4 G/DL — SIGNIFICANT CHANGE UP (ref 11.5–15.5)
IMM GRANULOCYTES NFR BLD AUTO: 0.3 % — SIGNIFICANT CHANGE UP (ref 0–0.9)
INR BLD: 0.98 RATIO — SIGNIFICANT CHANGE UP (ref 0.85–1.18)
KETONES UR-MCNC: NEGATIVE MG/DL — SIGNIFICANT CHANGE UP
LACTATE BLDV-MCNC: 1.7 MMOL/L — SIGNIFICANT CHANGE UP (ref 0.5–2)
LEUKOCYTE ESTERASE UR-ACNC: ABNORMAL
LYMPHOCYTES # BLD AUTO: 0.87 K/UL — LOW (ref 1–3.3)
LYMPHOCYTES # BLD AUTO: 13.5 % — SIGNIFICANT CHANGE UP (ref 13–44)
MCHC RBC-ENTMCNC: 30 PG — SIGNIFICANT CHANGE UP (ref 27–34)
MCHC RBC-ENTMCNC: 33.6 GM/DL — SIGNIFICANT CHANGE UP (ref 32–36)
MCV RBC AUTO: 89.1 FL — SIGNIFICANT CHANGE UP (ref 80–100)
MONOCYTES # BLD AUTO: 0.6 K/UL — SIGNIFICANT CHANGE UP (ref 0–0.9)
MONOCYTES NFR BLD AUTO: 9.3 % — SIGNIFICANT CHANGE UP (ref 2–14)
NEUTROPHILS # BLD AUTO: 4.91 K/UL — SIGNIFICANT CHANGE UP (ref 1.8–7.4)
NEUTROPHILS NFR BLD AUTO: 76.4 % — SIGNIFICANT CHANGE UP (ref 43–77)
NITRITE UR-MCNC: NEGATIVE — SIGNIFICANT CHANGE UP
PH UR: 6 — SIGNIFICANT CHANGE UP (ref 5–8)
PLATELET # BLD AUTO: 292 K/UL — SIGNIFICANT CHANGE UP (ref 150–400)
POTASSIUM SERPL-MCNC: 4.1 MMOL/L — SIGNIFICANT CHANGE UP (ref 3.5–5.3)
POTASSIUM SERPL-SCNC: 4.1 MMOL/L — SIGNIFICANT CHANGE UP (ref 3.5–5.3)
PROT SERPL-MCNC: 6.9 G/DL — SIGNIFICANT CHANGE UP (ref 6.6–8.7)
PROT UR-MCNC: NEGATIVE MG/DL — SIGNIFICANT CHANGE UP
PROTHROM AB SERPL-ACNC: 10.9 SEC — SIGNIFICANT CHANGE UP (ref 9.5–13)
RAPID RVP RESULT: SIGNIFICANT CHANGE UP
RBC # BLD: 5.14 M/UL — SIGNIFICANT CHANGE UP (ref 3.8–5.2)
RBC # FLD: 14.4 % — SIGNIFICANT CHANGE UP (ref 10.3–14.5)
RBC CASTS # UR COMP ASSIST: 3 /HPF — SIGNIFICANT CHANGE UP (ref 0–4)
SARS-COV-2 RNA SPEC QL NAA+PROBE: SIGNIFICANT CHANGE UP
SODIUM SERPL-SCNC: 138 MMOL/L — SIGNIFICANT CHANGE UP (ref 135–145)
SP GR SPEC: 1.02 — SIGNIFICANT CHANGE UP (ref 1–1.03)
SQUAMOUS # UR AUTO: 1 /HPF — SIGNIFICANT CHANGE UP (ref 0–5)
UROBILINOGEN FLD QL: 1 MG/DL — SIGNIFICANT CHANGE UP (ref 0.2–1)
WBC # BLD: 6.43 K/UL — SIGNIFICANT CHANGE UP (ref 3.8–10.5)
WBC # FLD AUTO: 6.43 K/UL — SIGNIFICANT CHANGE UP (ref 3.8–10.5)
WBC UR QL: 2 /HPF — SIGNIFICANT CHANGE UP (ref 0–5)

## 2024-02-10 PROCEDURE — 99223 1ST HOSP IP/OBS HIGH 75: CPT

## 2024-02-10 PROCEDURE — G1004: CPT

## 2024-02-10 PROCEDURE — 99497 ADVNCD CARE PLAN 30 MIN: CPT | Mod: 25

## 2024-02-10 PROCEDURE — 93010 ELECTROCARDIOGRAM REPORT: CPT

## 2024-02-10 PROCEDURE — 74176 CT ABD & PELVIS W/O CONTRAST: CPT | Mod: 26,ME

## 2024-02-10 PROCEDURE — 71045 X-RAY EXAM CHEST 1 VIEW: CPT | Mod: 26

## 2024-02-10 RX ORDER — PIPERACILLIN AND TAZOBACTAM 4; .5 G/20ML; G/20ML
3.38 INJECTION, POWDER, LYOPHILIZED, FOR SOLUTION INTRAVENOUS ONCE
Refills: 0 | Status: COMPLETED | OUTPATIENT
Start: 2024-02-10 | End: 2024-02-10

## 2024-02-10 RX ORDER — ACETAMINOPHEN 500 MG
1000 TABLET ORAL ONCE
Refills: 0 | Status: COMPLETED | OUTPATIENT
Start: 2024-02-10 | End: 2024-02-10

## 2024-02-10 RX ORDER — SODIUM CHLORIDE 9 MG/ML
1000 INJECTION INTRAMUSCULAR; INTRAVENOUS; SUBCUTANEOUS ONCE
Refills: 0 | Status: COMPLETED | OUTPATIENT
Start: 2024-02-10 | End: 2024-02-10

## 2024-02-10 RX ORDER — HYDROCORTISONE 20 MG
100 TABLET ORAL ONCE
Refills: 0 | Status: COMPLETED | OUTPATIENT
Start: 2024-02-10 | End: 2024-02-10

## 2024-02-10 RX ORDER — ACETAMINOPHEN 500 MG
650 TABLET ORAL EVERY 6 HOURS
Refills: 0 | Status: DISCONTINUED | OUTPATIENT
Start: 2024-02-10 | End: 2024-02-13

## 2024-02-10 RX ORDER — VANCOMYCIN HCL 1 G
1000 VIAL (EA) INTRAVENOUS EVERY 12 HOURS
Refills: 0 | Status: COMPLETED | OUTPATIENT
Start: 2024-02-10 | End: 2024-02-10

## 2024-02-10 RX ORDER — LANOLIN ALCOHOL/MO/W.PET/CERES
3 CREAM (GRAM) TOPICAL AT BEDTIME
Refills: 0 | Status: DISCONTINUED | OUTPATIENT
Start: 2024-02-10 | End: 2024-02-13

## 2024-02-10 RX ORDER — HYDROCORTISONE 20 MG
5 TABLET ORAL THREE TIMES A DAY
Refills: 0 | Status: DISCONTINUED | OUTPATIENT
Start: 2024-02-10 | End: 2024-02-10

## 2024-02-10 RX ORDER — ONDANSETRON 8 MG/1
4 TABLET, FILM COATED ORAL ONCE
Refills: 0 | Status: COMPLETED | OUTPATIENT
Start: 2024-02-10 | End: 2024-02-10

## 2024-02-10 RX ORDER — SODIUM CHLORIDE 9 MG/ML
1700 INJECTION INTRAMUSCULAR; INTRAVENOUS; SUBCUTANEOUS ONCE
Refills: 0 | Status: COMPLETED | OUTPATIENT
Start: 2024-02-10 | End: 2024-02-10

## 2024-02-10 RX ORDER — ONDANSETRON 8 MG/1
4 TABLET, FILM COATED ORAL EVERY 6 HOURS
Refills: 0 | Status: DISCONTINUED | OUTPATIENT
Start: 2024-02-10 | End: 2024-02-13

## 2024-02-10 RX ORDER — IOHEXOL 300 MG/ML
30 INJECTION, SOLUTION INTRAVENOUS ONCE
Refills: 0 | Status: COMPLETED | OUTPATIENT
Start: 2024-02-10 | End: 2024-02-10

## 2024-02-10 RX ORDER — HYDROCORTISONE 20 MG
50 TABLET ORAL EVERY 6 HOURS
Refills: 0 | Status: COMPLETED | OUTPATIENT
Start: 2024-02-10 | End: 2024-02-11

## 2024-02-10 RX ORDER — VANCOMYCIN HCL 1 G
VIAL (EA) INTRAVENOUS
Refills: 0 | Status: COMPLETED | OUTPATIENT
Start: 2024-02-10 | End: 2024-02-10

## 2024-02-10 RX ORDER — VANCOMYCIN HCL 1 G
1000 VIAL (EA) INTRAVENOUS ONCE
Refills: 0 | Status: COMPLETED | OUTPATIENT
Start: 2024-02-10 | End: 2024-02-10

## 2024-02-10 RX ADMIN — ONDANSETRON 4 MILLIGRAM(S): 8 TABLET, FILM COATED ORAL at 09:46

## 2024-02-10 RX ADMIN — Medication 166.67 MILLIGRAM(S): at 09:38

## 2024-02-10 RX ADMIN — IOHEXOL 30 MILLILITER(S): 300 INJECTION, SOLUTION INTRAVENOUS at 09:15

## 2024-02-10 RX ADMIN — Medication 100 MILLIGRAM(S): at 21:33

## 2024-02-10 RX ADMIN — SODIUM CHLORIDE 1000 MILLILITER(S): 9 INJECTION INTRAMUSCULAR; INTRAVENOUS; SUBCUTANEOUS at 19:20

## 2024-02-10 RX ADMIN — SODIUM CHLORIDE 1700 MILLILITER(S): 9 INJECTION INTRAMUSCULAR; INTRAVENOUS; SUBCUTANEOUS at 08:55

## 2024-02-10 RX ADMIN — PIPERACILLIN AND TAZOBACTAM 25 GRAM(S): 4; .5 INJECTION, POWDER, LYOPHILIZED, FOR SOLUTION INTRAVENOUS at 14:11

## 2024-02-10 RX ADMIN — Medication 400 MILLIGRAM(S): at 08:56

## 2024-02-10 RX ADMIN — Medication 1000 MILLIGRAM(S): at 09:11

## 2024-02-10 RX ADMIN — SODIUM CHLORIDE 500 MILLILITER(S): 9 INJECTION INTRAMUSCULAR; INTRAVENOUS; SUBCUTANEOUS at 16:07

## 2024-02-10 RX ADMIN — Medication 400 MILLIGRAM(S): at 16:07

## 2024-02-10 RX ADMIN — PIPERACILLIN AND TAZOBACTAM 3.38 GRAM(S): 4; .5 INJECTION, POWDER, LYOPHILIZED, FOR SOLUTION INTRAVENOUS at 09:25

## 2024-02-10 RX ADMIN — PIPERACILLIN AND TAZOBACTAM 200 GRAM(S): 4; .5 INJECTION, POWDER, LYOPHILIZED, FOR SOLUTION INTRAVENOUS at 08:55

## 2024-02-10 NOTE — ED CDU PROVIDER INITIAL DAY NOTE - OBJECTIVE STATEMENT
72 y/o F c/o 2 days nausea/vomiting, body aches and fever.  + sick contacts at home.  Pt's  had same symptoms earlier in the week.  Denies chest pain, shortness of breath or focal weaknesses.  Denies abdominal pain at this time, and states that nausea has subsided since she's been in ED.

## 2024-02-10 NOTE — ED CDU PROVIDER INITIAL DAY NOTE - ATTENDING APP SHARED VISIT CONTRIBUTION OF CARE
I, Joey Moreland MD, performed the initial face to face bedside interview with this patient regarding history of present illness, review of symptoms and relevant past medical, social and family history.  I completed an independent physical examination.  I was the initial provider who evaluated this patient. I have signed out the follow up of any pending tests (i.e. labs, radiological studies) to the ACP.  I have communicated the patient’s plan of care and disposition with the ACP.

## 2024-02-10 NOTE — H&P ADULT - ASSESSMENT
73yoF hx adrenal insufficiency s/p L-adrenal gland resection for benign neoplasm complicated by post-operative bilateral PE (2022), s/p several month course of Xarelto, now off AC, presenting with abdominal pain, nausea, vomiting and watery diarrhea; on admission, pt with recurrent fevers, tachycardia, episodes of hypotension an CT showing questionable enterocolitis in terminal ileum and right colon    Sepsis due to enterocolitis   -CT A/P with questionable enterocolitis but clinical symptoms support diagnosis  -Could be viral, but will treat as bacterial given sepsis w/ hypotension   -s/p vancomycin and zosyn by ED, continue with Zosyn for now  -Lactate 1.7, s/p 3.7L given by ED   -Clear liquid diet  -Blood cx pending  -Obtain GI PCR and stool cx    Hypotension   -Due to hypovolemia from diarrhea, adrenal insufficiency   -s/p almost 4L of IVF by ED, SBP now in low 100s  -Will start stress dose steroids- hydrocortisone 100mg x 1, then 50mg q6hr x 24  -Step down for q2hr BP monitoring  -Hold off on maintenance IVF as has been aggressively fluid resuscitated  -Low threshold for MICU evaluation if hypotension reoccurs    Tachycardia  -ECG shows sinus tachycardia  -Driven by sepsis, hypovolemia, persistent but gradually improving with IVF  -Low suspicion for PE at this time    Hx adrenal insufficiency   -Started on stress dose steroids as above  -If BP remains stable and GI symptoms improving, resume hydrocortisone 15mg in AM    Hx HTN/HLD  -Hold metoprolol and amlodipine due to hypotension  -Resume statin    Hx hypothyroidism  -Levothyroxine 100mcg daily    Hx chronic pain syndrome  -From degenerative joint disease/OA  -Resume oxycodone 5mg PRN     Prophylactic measure  -High risk given PE history, start lovenox 40mg daily  73yoF hx adrenal insufficiency s/p L-adrenal gland resection for benign neoplasm complicated by post-operative bilateral PE (2022), s/p several month course of Xarelto, now off AC, presenting with abdominal pain, nausea, vomiting and watery diarrhea; on admission, pt with recurrent fevers, tachycardia, episodes of hypotension and CT showing questionable enterocolitis in terminal ileum and right colon    Sepsis due to enterocolitis   -CT A/P with questionable enterocolitis but clinical symptoms support diagnosis  -Could be viral, but will treat as bacterial given sepsis w/ hypotension   -s/p vancomycin and zosyn by ED, continue with Zosyn for now  -Lactate 1.7, s/p 3.7L given by ED   -Clear liquid diet  -Blood cx pending  -Obtain GI PCR and stool cx    Hypotension   -Due to hypovolemia from diarrhea, adrenal insufficiency   -s/p almost 4L of IVF by ED, SBP now in low 100s  -Will start stress dose steroids- hydrocortisone 100mg x 1, then 50mg q6hr x 24hr, then re-assess  -Step down for q2hr BP monitoring  -Hold off on maintenance IVF as has been aggressively fluid resuscitated  -Low threshold for MICU evaluation if hypotension reoccurs    Tachycardia  -ECG shows sinus tachycardia  -Driven by sepsis, hypovolemia, persistent but gradually improving with IVF  -Low suspicion for PE at this time    Hx adrenal insufficiency   -Started on stress dose steroids as above  -If BP remains stable and GI symptoms improving, resume hydrocortisone 15mg in AM    Hx HTN/HLD  -Hold metoprolol and amlodipine due to hypotension  -Resume statin    Hx hypothyroidism  -Levothyroxine 100mcg daily    Hx chronic pain syndrome  -From degenerative joint disease/OA  -Resume oxycodone 5mg PRN     Prophylactic measure  -High risk given PE history, start lovenox 40mg daily

## 2024-02-10 NOTE — H&P ADULT - NSHPLABSRESULTS_GEN_ALL_CORE
02-10    138  |  102  |  13.4  ----------------------------<  103<H>  4.1   |  23.0  |  0.69    Ca    9.1      10 Feb 2024 08:30    TPro  6.9  /  Alb  3.7  /  TBili  0.4  /  DBili  x   /  AST  32<H>  /  ALT  23  /  AlkPhos  108  02-10                            15.4   6.43  )-----------( 292      ( 10 Feb 2024 08:30 )             45.8

## 2024-02-10 NOTE — H&P ADULT - HISTORY OF PRESENT ILLNESS
73yoF hx adrenal insufficiency s/p L-adrenal gland resection for benign neoplasm complicated by post-operative bilateral PE (2022), s/p several month course of Xarelto, now off AC, presenting with few days of abdominal pain in lower abdominal quadrants associated with nausea, vomiting and watery diarrhea.  Pt also reporting subjective fevers and chills.  Her  has ill with influenza at home.  On admission, pt with recurrent fevers, sinus tachycardia and episodes of hypotension with SBP ben of 82.  CT A/P revealed questionable mild enterocolitis in terminal ileum and right colon.

## 2024-02-10 NOTE — H&P ADULT - NSHPPHYSICALEXAM_GEN_ALL_CORE
Vital Signs Last 24 Hrs  T(C): 37.1 (11 Feb 2024 02:05), Max: 38.4 (10 Feb 2024 09:26)  T(F): 98.8 (11 Feb 2024 02:05), Max: 101.2 (10 Feb 2024 09:26)  HR: 104 (11 Feb 2024 02:05) (104 - 126)  BP: 131/66 (11 Feb 2024 02:05) (82/56 - 157/86)  BP(mean): --  RR: 15 (11 Feb 2024 02:05) (15 - 20)  SpO2: 96% (11 Feb 2024 02:05) (95% - 100%)    Parameters below as of 11 Feb 2024 02:05  Patient On (Oxygen Delivery Method): room air    GENERAL:  Tired-appearing elderly female, not in acute distress  EYES:  Clear conjunctiva, extraocular movement intact  ENT: Moist mucous membranes  RESP:  Non-labored breathing pattern, lungs clear to ausculation in anterior fields  CV: Regular rate and rhythm, no murmurs appreciated, no lower extremity edema  GI: Soft, protuberant abdomen, non-tender, non-distended  NEURO: Awake, alert, conversant, UE strength 4/5, LE strength 3/5, light touch sensation grossly intact  PSYCH: Calm, cooperative  SKIN: No rash or lesions, warm and dry

## 2024-02-10 NOTE — ED PROVIDER NOTE - CLINICAL SUMMARY MEDICAL DECISION MAKING FREE TEXT BOX
73 year old female w/PMHx HTN, Adrenal Insufficiency, HLD, B/L PE's on Xarelto, and Hypothyroidism BIBEMS for vomiting. Currently c/o left lower quadrant pain, bilateral ankle pain, diarrhea and nausea. Code sepsis called for elevated temp, tachycardia 126. Suspected diverticulitis. Blood work, cultures, UA, chest xray and CT abd w/oral contrast ordered. Started on zosyn and vanc. Started on 1700ml IV saline. 73 year old female w/PMHx HTN, Adrenal Insufficiency, HLD, B/L PE's on Xarelto, and Hypothyroidism BIBEMS for vomiting. Currently c/o left lower quadrant pain, bilateral ankle pain, diarrhea and nausea. Code sepsis called for elevated temp, tachycardia 126. Suspected diverticulitis. Blood work, cultures, UA, chest xray and CT abd w/oral contrast ordered. Started on zosyn and vanc. Started on 1700ml IV saline. Patient without elevated lactate. CT abd negative for diverticulitis. Placed in obs with plan to be discharged if signs of improvement.

## 2024-02-10 NOTE — ED ADULT TRIAGE NOTE - CHIEF COMPLAINT QUOTE
Pt BIBA c/o vomiting, diarrhea x 2 days.  Accompanying chills, body aches, back pain.  Pt had episode of diarrhea in triage. Code sepsis called

## 2024-02-10 NOTE — ED ADULT NURSE NOTE - NSFALLRISKINTERV_ED_ALL_ED
Assistance OOB with selected safe patient handling equipment if applicable/Assistance with ambulation/Communicate fall risk and risk factors to all staff, patient, and family/Monitor gait and stability/Provide visual cue: yellow wristband, yellow gown, etc/Reinforce activity limits and safety measures with patient and family/Call bell, personal items and telephone in reach/Instruct patient to call for assistance before getting out of bed/chair/stretcher/Non-slip footwear applied when patient is off stretcher/[place] to call system/Physically safe environment - no spills, clutter or unnecessary equipment/Purposeful Proactive Rounding/Room/bathroom lighting operational, light cord in reach

## 2024-02-10 NOTE — ED PROVIDER NOTE - ATTENDING CONTRIBUTION TO CARE
73-year-old female  with history of hypertension, HLD, history of bilateral PE, hypothyroidism and adrenal insufficiency presents to ED via EMS complaining of increased nausea vomiting and diarrhea times multiple episodes since yesterday, with associated chills.      Patient states her  recently had COVID approximately 1 week ago/  On arrival to ED patient noted to be febrile, tachycardic for which code sepsis was called.   on exam patient awake and alert,  tachycardic, and appears uncomfortable.   PERRL, mucous memories moist, neck supple,  heart tachycardic, lungs clear bilaterally, abdomen soft positive left lower quadrant tenderness to palpation no rebound or rigidity, extremities no cyanosis or edema, neuro no focal deficits.  Patient likely with acute gastroenteritis, will Rx empirically for sepsis, check labs CT abdomen pelvis, IV fluids start empiric antibiotics and reevaluate

## 2024-02-10 NOTE — ED PROVIDER NOTE - PHYSICAL EXAMINATION
Gen: NAD, AOx3  Head: NCAT  HEENT: EOMI, oral mucosa dry, normal conjunctiva, neck supple  Lung: CTAB, no respiratory distress  CV: rrr, no murmur, Normal perfusion  Abd: left lower quadrant tenderness   MSK: bilateral ankle pain to palpation  Neuro: No focal neurologic deficits  Skin: No rash   Psych: normal affect

## 2024-02-10 NOTE — ED PROVIDER NOTE - OBJECTIVE STATEMENT
73 year old female w/PMHx HTN, Adrenal Insufficiency, HLD, B/L PE's on Xarelto, and Hypothyroidism BIBEMS for vomiting. Patient states that for the past 3 days has been having left lower quadrant pain. Has had multiple episodes of vomiting and diarrhea since yesterday, patient unsure how many. Associated with chills and tactile fevers. Unable to tolerate PO. 2 episodes of diarrhea at the ED. Denies CP, SOB, headache, numbness or tingling, dysuria, hematuria, dark stools, cough, congestion.  sick with flu at home x1 week ago. Known allergy to iodine.

## 2024-02-10 NOTE — ED CDU PROVIDER DISPOSITION NOTE - CLINICAL COURSE
72 y/o F with abd pain , vomiting/diarrhea x 2 days - labs and abd CT normal- pt remains tachycardic, febrile and hypotensive in obs - admit to stepdown

## 2024-02-10 NOTE — H&P ADULT - CONVERSATION DETAILS
Pt would like aggressive measures in event of cardiac arrest including CPR, mechanical ventilation.  Pt also amenable to trial of vasopressors for IVF refractory hypotension.  Pt is FULL CODE.

## 2024-02-10 NOTE — ED CDU PROVIDER INITIAL DAY NOTE - CLINICAL SUMMARY MEDICAL DECISION MAKING FREE TEXT BOX
74 y/o F with viral syndrome - labs and CT normal - will place in obs for fluids, monitoring of vitals and observation

## 2024-02-10 NOTE — ED ADULT NURSE NOTE - OBJECTIVE STATEMENT
Assumed care at 0815 pt co N/V/D that started last night, pt states her  was diagnosed with COVID one week ago, pt also co fevers at home, took Tylenol last night. pt has chronic lower back pian. Denies any abdominal pain, chest pain, SOB. pt placed on cardiac monitor and pulse ox.

## 2024-02-10 NOTE — H&P ADULT - NSICDXPASTMEDICALHX_GEN_ALL_CORE_FT
PAST MEDICAL HISTORY:  At risk for sleep apnea Bang score 3    Benign neoplasm of unspecified adrenal gland     COVID-19 vaccine series completed     Depression     H/O adrenal insufficiency     Hyperlipidemia     Hypertension     Hypothyroidism     Pulmonary embolism

## 2024-02-11 LAB
ALBUMIN SERPL ELPH-MCNC: 3.2 G/DL — LOW (ref 3.3–5.2)
ALP SERPL-CCNC: 89 U/L — SIGNIFICANT CHANGE UP (ref 40–120)
ALT FLD-CCNC: 24 U/L — SIGNIFICANT CHANGE UP
ANION GAP SERPL CALC-SCNC: 15 MMOL/L — SIGNIFICANT CHANGE UP (ref 5–17)
ANION GAP SERPL CALC-SCNC: 17 MMOL/L — SIGNIFICANT CHANGE UP (ref 5–17)
AST SERPL-CCNC: 28 U/L — SIGNIFICANT CHANGE UP
BASOPHILS # BLD AUTO: 0 K/UL — SIGNIFICANT CHANGE UP (ref 0–0.2)
BASOPHILS NFR BLD AUTO: 0 % — SIGNIFICANT CHANGE UP (ref 0–2)
BILIRUB SERPL-MCNC: 0.3 MG/DL — LOW (ref 0.4–2)
BUN SERPL-MCNC: 5.1 MG/DL — LOW (ref 8–20)
BUN SERPL-MCNC: 5.4 MG/DL — LOW (ref 8–20)
CALCIUM SERPL-MCNC: 8.1 MG/DL — LOW (ref 8.4–10.5)
CALCIUM SERPL-MCNC: 8.4 MG/DL — SIGNIFICANT CHANGE UP (ref 8.4–10.5)
CHLORIDE SERPL-SCNC: 109 MMOL/L — HIGH (ref 96–108)
CHLORIDE SERPL-SCNC: 111 MMOL/L — HIGH (ref 96–108)
CO2 SERPL-SCNC: 15 MMOL/L — LOW (ref 22–29)
CO2 SERPL-SCNC: 16 MMOL/L — LOW (ref 22–29)
CREAT SERPL-MCNC: 0.44 MG/DL — LOW (ref 0.5–1.3)
CREAT SERPL-MCNC: 0.52 MG/DL — SIGNIFICANT CHANGE UP (ref 0.5–1.3)
CRP SERPL-MCNC: 73 MG/L — HIGH
CULTURE RESULTS: NO GROWTH — SIGNIFICANT CHANGE UP
EGFR: 101 ML/MIN/1.73M2 — SIGNIFICANT CHANGE UP
EGFR: 97 ML/MIN/1.73M2 — SIGNIFICANT CHANGE UP
EOSINOPHIL # BLD AUTO: 0 K/UL — SIGNIFICANT CHANGE UP (ref 0–0.5)
EOSINOPHIL NFR BLD AUTO: 0 % — SIGNIFICANT CHANGE UP (ref 0–6)
ERYTHROCYTE [SEDIMENTATION RATE] IN BLOOD: 10 MM/HR — SIGNIFICANT CHANGE UP (ref 0–20)
GLUCOSE SERPL-MCNC: 150 MG/DL — HIGH (ref 70–99)
GLUCOSE SERPL-MCNC: 186 MG/DL — HIGH (ref 70–99)
HCT VFR BLD CALC: 40.7 % — SIGNIFICANT CHANGE UP (ref 34.5–45)
HGB BLD-MCNC: 12.7 G/DL — SIGNIFICANT CHANGE UP (ref 11.5–15.5)
LYMPHOCYTES # BLD AUTO: 0.36 K/UL — LOW (ref 1–3.3)
LYMPHOCYTES # BLD AUTO: 6.1 % — LOW (ref 13–44)
MAGNESIUM SERPL-MCNC: 1.8 MG/DL — SIGNIFICANT CHANGE UP (ref 1.6–2.6)
MANUAL SMEAR VERIFICATION: SIGNIFICANT CHANGE UP
MCHC RBC-ENTMCNC: 28.7 PG — SIGNIFICANT CHANGE UP (ref 27–34)
MCHC RBC-ENTMCNC: 31.2 GM/DL — LOW (ref 32–36)
MCV RBC AUTO: 91.9 FL — SIGNIFICANT CHANGE UP (ref 80–100)
MONOCYTES # BLD AUTO: 0 K/UL — SIGNIFICANT CHANGE UP (ref 0–0.9)
MONOCYTES NFR BLD AUTO: 0 % — LOW (ref 2–14)
NEUTROPHILS # BLD AUTO: 5.52 K/UL — SIGNIFICANT CHANGE UP (ref 1.8–7.4)
NEUTROPHILS NFR BLD AUTO: 93 % — HIGH (ref 43–77)
PLAT MORPH BLD: NORMAL — SIGNIFICANT CHANGE UP
PLATELET # BLD AUTO: 246 K/UL — SIGNIFICANT CHANGE UP (ref 150–400)
POTASSIUM SERPL-MCNC: 3 MMOL/L — LOW (ref 3.5–5.3)
POTASSIUM SERPL-MCNC: 3.5 MMOL/L — SIGNIFICANT CHANGE UP (ref 3.5–5.3)
POTASSIUM SERPL-SCNC: 3 MMOL/L — LOW (ref 3.5–5.3)
POTASSIUM SERPL-SCNC: 3.5 MMOL/L — SIGNIFICANT CHANGE UP (ref 3.5–5.3)
PROCALCITONIN SERPL-MCNC: 0.26 NG/ML — HIGH (ref 0.02–0.1)
PROT SERPL-MCNC: 5.6 G/DL — LOW (ref 6.6–8.7)
RBC # BLD: 4.43 M/UL — SIGNIFICANT CHANGE UP (ref 3.8–5.2)
RBC # FLD: 14.4 % — SIGNIFICANT CHANGE UP (ref 10.3–14.5)
RBC BLD AUTO: NORMAL — SIGNIFICANT CHANGE UP
SODIUM SERPL-SCNC: 140 MMOL/L — SIGNIFICANT CHANGE UP (ref 135–145)
SODIUM SERPL-SCNC: 143 MMOL/L — SIGNIFICANT CHANGE UP (ref 135–145)
SPECIMEN SOURCE: SIGNIFICANT CHANGE UP
VARIANT LYMPHS # BLD: 0.9 % — SIGNIFICANT CHANGE UP (ref 0–6)
WBC # BLD: 5.94 K/UL — SIGNIFICANT CHANGE UP (ref 3.8–10.5)
WBC # FLD AUTO: 5.94 K/UL — SIGNIFICANT CHANGE UP (ref 3.8–10.5)

## 2024-02-11 PROCEDURE — 99233 SBSQ HOSP IP/OBS HIGH 50: CPT

## 2024-02-11 RX ORDER — OXYCODONE HYDROCHLORIDE 5 MG/1
5 TABLET ORAL EVERY 6 HOURS
Refills: 0 | Status: DISCONTINUED | OUTPATIENT
Start: 2024-02-11 | End: 2024-02-13

## 2024-02-11 RX ORDER — ENOXAPARIN SODIUM 100 MG/ML
40 INJECTION SUBCUTANEOUS EVERY 24 HOURS
Refills: 0 | Status: DISCONTINUED | OUTPATIENT
Start: 2024-02-11 | End: 2024-02-13

## 2024-02-11 RX ORDER — ERGOCALCIFEROL 1.25 MG/1
50000 CAPSULE ORAL
Refills: 0 | Status: DISCONTINUED | OUTPATIENT
Start: 2024-02-11 | End: 2024-02-13

## 2024-02-11 RX ORDER — POTASSIUM CHLORIDE 20 MEQ
10 PACKET (EA) ORAL
Refills: 0 | Status: COMPLETED | OUTPATIENT
Start: 2024-02-11 | End: 2024-02-11

## 2024-02-11 RX ORDER — HYDROCORTISONE 20 MG
3 TABLET ORAL
Refills: 0 | DISCHARGE

## 2024-02-11 RX ORDER — DIAZEPAM 5 MG
1 TABLET ORAL
Refills: 0 | DISCHARGE

## 2024-02-11 RX ORDER — ERGOCALCIFEROL 1.25 MG/1
1 CAPSULE ORAL
Refills: 0 | DISCHARGE

## 2024-02-11 RX ORDER — PIPERACILLIN AND TAZOBACTAM 4; .5 G/20ML; G/20ML
3.38 INJECTION, POWDER, LYOPHILIZED, FOR SOLUTION INTRAVENOUS EVERY 8 HOURS
Refills: 0 | Status: DISCONTINUED | OUTPATIENT
Start: 2024-02-11 | End: 2024-02-13

## 2024-02-11 RX ORDER — METOPROLOL TARTRATE 50 MG
1 TABLET ORAL
Refills: 0 | DISCHARGE

## 2024-02-11 RX ORDER — ATORVASTATIN CALCIUM 80 MG/1
10 TABLET, FILM COATED ORAL AT BEDTIME
Refills: 0 | Status: DISCONTINUED | OUTPATIENT
Start: 2024-02-11 | End: 2024-02-13

## 2024-02-11 RX ORDER — AMLODIPINE BESYLATE 2.5 MG/1
1 TABLET ORAL
Refills: 0 | DISCHARGE

## 2024-02-11 RX ORDER — LEVOTHYROXINE SODIUM 125 MCG
100 TABLET ORAL DAILY
Refills: 0 | Status: DISCONTINUED | OUTPATIENT
Start: 2024-02-11 | End: 2024-02-13

## 2024-02-11 RX ORDER — SODIUM BICARBONATE 1 MEQ/ML
650 SYRINGE (ML) INTRAVENOUS THREE TIMES A DAY
Refills: 0 | Status: DISCONTINUED | OUTPATIENT
Start: 2024-02-11 | End: 2024-02-13

## 2024-02-11 RX ORDER — HYDROCORTISONE 20 MG
15 TABLET ORAL DAILY
Refills: 0 | Status: DISCONTINUED | OUTPATIENT
Start: 2024-02-12 | End: 2024-02-13

## 2024-02-11 RX ORDER — CHOLECALCIFEROL (VITAMIN D3) 125 MCG
1 CAPSULE ORAL
Qty: 0 | Refills: 0 | DISCHARGE

## 2024-02-11 RX ORDER — OXYCODONE HYDROCHLORIDE 5 MG/1
1 TABLET ORAL
Refills: 0 | DISCHARGE

## 2024-02-11 RX ORDER — METOPROLOL TARTRATE 50 MG
1 TABLET ORAL
Qty: 0 | Refills: 0 | DISCHARGE

## 2024-02-11 RX ORDER — MECLIZINE HCL 12.5 MG
1 TABLET ORAL
Refills: 0 | DISCHARGE

## 2024-02-11 RX ORDER — LEVOTHYROXINE SODIUM 125 MCG
1 TABLET ORAL
Refills: 0 | DISCHARGE

## 2024-02-11 RX ADMIN — OXYCODONE HYDROCHLORIDE 5 MILLIGRAM(S): 5 TABLET ORAL at 05:26

## 2024-02-11 RX ADMIN — OXYCODONE HYDROCHLORIDE 5 MILLIGRAM(S): 5 TABLET ORAL at 18:09

## 2024-02-11 RX ADMIN — Medication 650 MILLIGRAM(S): at 13:23

## 2024-02-11 RX ADMIN — Medication 166.67 MILLIGRAM(S): at 01:00

## 2024-02-11 RX ADMIN — Medication 650 MILLIGRAM(S): at 21:41

## 2024-02-11 RX ADMIN — Medication 50 MILLIGRAM(S): at 08:15

## 2024-02-11 RX ADMIN — PIPERACILLIN AND TAZOBACTAM 25 GRAM(S): 4; .5 INJECTION, POWDER, LYOPHILIZED, FOR SOLUTION INTRAVENOUS at 21:41

## 2024-02-11 RX ADMIN — OXYCODONE HYDROCHLORIDE 5 MILLIGRAM(S): 5 TABLET ORAL at 23:46

## 2024-02-11 RX ADMIN — OXYCODONE HYDROCHLORIDE 5 MILLIGRAM(S): 5 TABLET ORAL at 13:23

## 2024-02-11 RX ADMIN — Medication 100 MILLIEQUIVALENT(S): at 10:01

## 2024-02-11 RX ADMIN — ATORVASTATIN CALCIUM 10 MILLIGRAM(S): 80 TABLET, FILM COATED ORAL at 21:41

## 2024-02-11 RX ADMIN — Medication 100 MILLIEQUIVALENT(S): at 11:10

## 2024-02-11 RX ADMIN — Medication 100 MILLIEQUIVALENT(S): at 12:44

## 2024-02-11 RX ADMIN — PIPERACILLIN AND TAZOBACTAM 25 GRAM(S): 4; .5 INJECTION, POWDER, LYOPHILIZED, FOR SOLUTION INTRAVENOUS at 13:56

## 2024-02-11 RX ADMIN — Medication 50 MILLIGRAM(S): at 21:40

## 2024-02-11 RX ADMIN — Medication 50 MILLIGRAM(S): at 13:23

## 2024-02-11 RX ADMIN — Medication 100 MICROGRAM(S): at 05:26

## 2024-02-11 RX ADMIN — Medication 50 MILLIGRAM(S): at 03:35

## 2024-02-11 RX ADMIN — Medication 650 MILLIGRAM(S): at 10:01

## 2024-02-11 RX ADMIN — Medication 650 MILLIGRAM(S): at 03:34

## 2024-02-11 RX ADMIN — PIPERACILLIN AND TAZOBACTAM 25 GRAM(S): 4; .5 INJECTION, POWDER, LYOPHILIZED, FOR SOLUTION INTRAVENOUS at 05:28

## 2024-02-11 RX ADMIN — ENOXAPARIN SODIUM 40 MILLIGRAM(S): 100 INJECTION SUBCUTANEOUS at 05:28

## 2024-02-11 NOTE — PROGRESS NOTE ADULT - ASSESSMENT
73yoF hx adrenal insufficiency s/p L-adrenal gland resection for benign neoplasm complicated by post-operative bilateral PE (2022), s/p several month course of Xarelto, now off AC, presenting with abdominal pain, nausea, vomiting and watery diarrhea; on admission, pt with recurrent fevers, tachycardia, episodes of hypotension and CT showing questionable enterocolitis in terminal ileum and right colon    Assessment/Plan:    1 Sepsis due to enterocolitis   -CT A/P with questionable enterocolitis but clinical symptoms support diagnosis  -s/p vancomycin and zosyn by ED, continue with Zosyn for now  -Lactate 1.7, s/p 3.7L given by ED   - Advance diet   -Blood cx pending  -Obtain GI PCR and stool cx    2. Hypotension   -Due to hypovolemia from diarrhea, adrenal insufficiency   -s/p almost 4L of IVF by ED, SBP now in low 100s  -Started on Stress dose of IV SOlucoret with improvement  - Change back to home dose of PO in Am     3. Tachycardia  -ECG shows sinus tachycardia  -Driven by sepsis, hypovolemia, persistent but gradually improving with IVF  -Low suspicion for PE at this time  - HR improving  - Telemetry monitoring     4. History of  adrenal insufficiency   -Started on stress dose steroids as above  -If BP remains stable and GI symptoms improving, resume hydrocortisone 15mg in AM    5. Hx HTN/HLD  -Hold metoprolol and amlodipine due to hypotension  -Resume statin    6. Hx hypothyroidism  -Levothyroxine 100mcg daily    7. Hx chronic pain syndrome  -From degenerative joint disease/OA  -Resume oxycodone 5mg PRN     VTE-  lovenox 40mg daily

## 2024-02-11 NOTE — ED ADULT NURSE REASSESSMENT NOTE - NS ED NURSE REASSESS COMMENT FT1
MD at bedside. patient updated on plan of care and makes no complaints at this time. patient remains on continuous cardiac monitor. NSR. patient safety maintained.
assumed care of patient from PADMINI Whitehead at this time. patient a/ox4 resting in stretcher watching tv. breathing is unlabored and equal and showing no acute signs of distress. patient makes no complaints at this time. patient safety maintained.
patient resting in stretcher without complaint. patient updated on plan of care to move to bed in CDU. patient understands with verbal feedback. patient vitals recorded and entered into EMR. patient safety maintained.
Assumed care of pt at 09:15 as stated in report from PADMINI Peters. Charting as noted. Patient A&O x4, denies pain/discomfort, denies CP/SOB. Updated on the plan of care. Call bell within reach, bed locked in lowest position. IV site flushed w/ NS. No redness, swelling or pain noted to site. No signs of acute distress noted, safety maintained. Pt remains on CM in sinus tach.
pt resting comfortably showing no signs of respiratory distress or pain, the pt is calm and cooperative, pt on cardiac monitor in sinus tach
pt resting comfortably showing no signs of respiratory distress or pain, the pt is calm and cooperative, pt on cardiac monitor in sinus tach

## 2024-02-12 ENCOUNTER — TRANSCRIPTION ENCOUNTER (OUTPATIENT)
Age: 74
End: 2024-02-12

## 2024-02-12 LAB
ALBUMIN SERPL ELPH-MCNC: 3.2 G/DL — LOW (ref 3.3–5.2)
ALP SERPL-CCNC: 92 U/L — SIGNIFICANT CHANGE UP (ref 40–120)
ALT FLD-CCNC: 19 U/L — SIGNIFICANT CHANGE UP
ANION GAP SERPL CALC-SCNC: 13 MMOL/L — SIGNIFICANT CHANGE UP (ref 5–17)
AST SERPL-CCNC: 19 U/L — SIGNIFICANT CHANGE UP
BILIRUB SERPL-MCNC: 0.3 MG/DL — LOW (ref 0.4–2)
BUN SERPL-MCNC: 8.3 MG/DL — SIGNIFICANT CHANGE UP (ref 8–20)
CALCIUM SERPL-MCNC: 8.6 MG/DL — SIGNIFICANT CHANGE UP (ref 8.4–10.5)
CHLORIDE SERPL-SCNC: 111 MMOL/L — HIGH (ref 96–108)
CO2 SERPL-SCNC: 20 MMOL/L — LOW (ref 22–29)
CREAT SERPL-MCNC: 0.58 MG/DL — SIGNIFICANT CHANGE UP (ref 0.5–1.3)
EGFR: 94 ML/MIN/1.73M2 — SIGNIFICANT CHANGE UP
GLUCOSE SERPL-MCNC: 122 MG/DL — HIGH (ref 70–99)
MAGNESIUM SERPL-MCNC: 2 MG/DL — SIGNIFICANT CHANGE UP (ref 1.6–2.6)
POTASSIUM SERPL-MCNC: 3.5 MMOL/L — SIGNIFICANT CHANGE UP (ref 3.5–5.3)
POTASSIUM SERPL-SCNC: 3.5 MMOL/L — SIGNIFICANT CHANGE UP (ref 3.5–5.3)
PROT SERPL-MCNC: 5.5 G/DL — LOW (ref 6.6–8.7)
SODIUM SERPL-SCNC: 144 MMOL/L — SIGNIFICANT CHANGE UP (ref 135–145)

## 2024-02-12 PROCEDURE — 99232 SBSQ HOSP IP/OBS MODERATE 35: CPT

## 2024-02-12 RX ORDER — DOCOSANOL 100 MG/G
1 CREAM TOPICAL THREE TIMES A DAY
Refills: 0 | Status: DISCONTINUED | OUTPATIENT
Start: 2024-02-12 | End: 2024-02-13

## 2024-02-12 RX ORDER — POLYETHYLENE GLYCOL 3350 17 G/17G
17 POWDER, FOR SOLUTION ORAL DAILY
Refills: 0 | Status: DISCONTINUED | OUTPATIENT
Start: 2024-02-12 | End: 2024-02-13

## 2024-02-12 RX ADMIN — Medication 650 MILLIGRAM(S): at 06:37

## 2024-02-12 RX ADMIN — Medication 15 MILLIGRAM(S): at 06:35

## 2024-02-12 RX ADMIN — PIPERACILLIN AND TAZOBACTAM 25 GRAM(S): 4; .5 INJECTION, POWDER, LYOPHILIZED, FOR SOLUTION INTRAVENOUS at 21:33

## 2024-02-12 RX ADMIN — Medication 100 MICROGRAM(S): at 06:37

## 2024-02-12 RX ADMIN — DOCOSANOL 1 APPLICATION(S): 100 CREAM TOPICAL at 23:43

## 2024-02-12 RX ADMIN — ATORVASTATIN CALCIUM 10 MILLIGRAM(S): 80 TABLET, FILM COATED ORAL at 21:32

## 2024-02-12 RX ADMIN — ENOXAPARIN SODIUM 40 MILLIGRAM(S): 100 INJECTION SUBCUTANEOUS at 06:35

## 2024-02-12 RX ADMIN — PIPERACILLIN AND TAZOBACTAM 25 GRAM(S): 4; .5 INJECTION, POWDER, LYOPHILIZED, FOR SOLUTION INTRAVENOUS at 15:18

## 2024-02-12 RX ADMIN — ONDANSETRON 4 MILLIGRAM(S): 8 TABLET, FILM COATED ORAL at 18:33

## 2024-02-12 RX ADMIN — Medication 650 MILLIGRAM(S): at 01:49

## 2024-02-12 RX ADMIN — Medication 650 MILLIGRAM(S): at 15:17

## 2024-02-12 RX ADMIN — PIPERACILLIN AND TAZOBACTAM 25 GRAM(S): 4; .5 INJECTION, POWDER, LYOPHILIZED, FOR SOLUTION INTRAVENOUS at 06:32

## 2024-02-12 RX ADMIN — OXYCODONE HYDROCHLORIDE 5 MILLIGRAM(S): 5 TABLET ORAL at 04:56

## 2024-02-12 RX ADMIN — OXYCODONE HYDROCHLORIDE 5 MILLIGRAM(S): 5 TABLET ORAL at 06:36

## 2024-02-12 RX ADMIN — Medication 650 MILLIGRAM(S): at 21:32

## 2024-02-12 NOTE — DISCHARGE NOTE PROVIDER - NSDCFUSCHEDAPPT_GEN_ALL_CORE_FT
Román Patricia  [facility]  [address]  Scheduled Appointment: 02/20/2024

## 2024-02-12 NOTE — DISCHARGE NOTE PROVIDER - NSDCCPCAREPLAN_GEN_ALL_CORE_FT
PRINCIPAL DISCHARGE DIAGNOSIS  Diagnosis: Viral colitis  Assessment and Plan of Treatment:      PRINCIPAL DISCHARGE DIAGNOSIS  Diagnosis: Viral colitis  Assessment and Plan of Treatment: Symptoms resolved   MOnitor for signs and symptoms of worsening abdominal pain, diarrhea or fever      SECONDARY DISCHARGE DIAGNOSES  Diagnosis: H/O adrenal insufficiency  Assessment and Plan of Treatment: Continue home dose of hydrocortisone

## 2024-02-12 NOTE — DISCHARGE NOTE PROVIDER - NSDCMRMEDTOKEN_GEN_ALL_CORE_FT
amLODIPine 5 mg oral tablet: 1 tab(s) orally once a day  diazePAM 5 mg oral tablet: 1 tab(s) orally every 6 hours  ergocalciferol 1.25 mg (50,000 intl units) oral tablet: 1 tab(s) orally once a week On Wednesday  hydrocortisone 5 mg oral tablet: 3 tab(s) orally once a day (in the morning)  levothyroxine 100 mcg (0.1 mg) oral tablet: 1 tab(s) orally once a day  meclizine 25 mg oral tablet: 1 tab(s) orally every 8 hours as needed for  vertigo  metoprolol succinate 25 mg oral tablet, extended release: 1 tab(s) orally once a day  oxyCODONE 5 mg oral capsule: 1 cap(s) orally every 6 hours as needed for  pain  pravastatin 40 mg oral tablet: 1 tab(s) orally once a day   amLODIPine 5 mg oral tablet: 1 tab(s) orally once a day  diazePAM 5 mg oral tablet: 1 tab(s) orally every 6 hours  ergocalciferol 1.25 mg (50,000 intl units) oral tablet: 1 tab(s) orally once a week On Wednesday  hydrocortisone 5 mg oral tablet: 3 tab(s) orally once a day (in the morning)  levothyroxine 100 mcg (0.1 mg) oral tablet: 1 tab(s) orally once a day  meclizine 25 mg oral tablet: 1 tab(s) orally every 8 hours as needed for  vertigo  metoprolol succinate 25 mg oral tablet, extended release: 1 tab(s) orally once a day  oxyCODONE 5 mg oral capsule: 1 cap(s) orally every 6 hours as needed for  pain  polyethylene glycol 3350 oral powder for reconstitution: 17 gram(s) orally once a day  pravastatin 40 mg oral tablet: 1 tab(s) orally once a day

## 2024-02-12 NOTE — DISCHARGE NOTE PROVIDER - HOSPITAL COURSE
73yoF hx adrenal insufficiency s/p L-adrenal gland resection for benign neoplasm complicated by post-operative bilateral PE (2022), s/p several month course of Xarelto, now off AC, presenting with abdominal pain, nausea, vomiting and watery diarrhea; on admission, pt with recurrent fevers, tachycardia, episodes of hypotension and CT showing questionable enterocolitis in terminal ileum and right colon. Started on empiric IV Zosyn. Blood cultures negative x 2. Hypotension and tachycardia improved with fluid resuscitation and stress dose IV Steroids. Diarrhea and vomiting resolved. PT consulted      Communicable/Infectious 73yoF hx adrenal insufficiency s/p L-adrenal gland resection for benign neoplasm complicated by post-operative bilateral PE (2022), s/p several month course of Xarelto, now off AC, presenting with abdominal pain, nausea, vomiting and watery diarrhea; on admission, pt with recurrent fevers, tachycardia, episodes of hypotension and CT showing questionable enterocolitis in terminal ileum and right colon. Started on empiric IV Zosyn. Blood cultures negative x 2. Hypotension and tachycardia improved with fluid resuscitation and stress dose IV Steroids. Diarrhea and vomiting resolved. PT consulted recommended home with assist

## 2024-02-12 NOTE — PROGRESS NOTE ADULT - ASSESSMENT
73yoF hx adrenal insufficiency s/p L-adrenal gland resection for benign neoplasm complicated by post-operative bilateral PE (2022), s/p several month course of Xarelto, now off AC, presenting with abdominal pain, nausea, vomiting and watery diarrhea; on admission, pt with recurrent fevers, tachycardia, episodes of hypotension and CT showing questionable enterocolitis in terminal ileum and right colon    Assessment/Plan:    1 Sepsis due to enterocolitis   -CT A/P with questionable enterocolitis but clinical symptoms support diagnosis  -s/p vancomycin and zosyn by ED, continue with Zosyn for now  -Lactate 1.7, s/p 3.7L given by ED   - Blood cultures negative x 2  - Urine culture negative  - Tolerating regular diet  - GI PCR pending however no BM since admission x    2. Hypotension   - IMproved   -Due to hypovolemia from diarrhea, adrenal insufficiency   -s/p almost 4L of IVF by ED, SBP now in low 100s  -Started on Stress dose of IV SOlucoret with improvement  - Back to home dose of hydrocortisone 15mg PO OD     3. Tachycardia  - Improved  -ECG shows sinus tachycardia  -Driven by sepsis, hypovolemia, persistent but gradually improving with IVF  -Low suspicion for PE at this time  - DC Tele     4. History of  adrenal insufficiency   -Started on stress dose steroids as above  -resume hydrocortisone 15mg     5. Hx HTN/HLD  -Hold metoprolol and amlodipine due to hypotension  - statin    6. Hx hypothyroidism  -Levothyroxine 100mcg daily    7. Hx chronic pain syndrome  -From degenerative joint disease/OA  - oxycodone 5mg PRN     VTE-  lovenox 40mg daily     PT evaluation

## 2024-02-13 ENCOUNTER — TRANSCRIPTION ENCOUNTER (OUTPATIENT)
Age: 74
End: 2024-02-13

## 2024-02-13 VITALS
RESPIRATION RATE: 18 BRPM | SYSTOLIC BLOOD PRESSURE: 132 MMHG | OXYGEN SATURATION: 96 % | DIASTOLIC BLOOD PRESSURE: 84 MMHG | HEART RATE: 90 BPM | TEMPERATURE: 98 F

## 2024-02-13 LAB
ALBUMIN SERPL ELPH-MCNC: 2.9 G/DL — LOW (ref 3.3–5.2)
ALP SERPL-CCNC: 74 U/L — SIGNIFICANT CHANGE UP (ref 40–120)
ALT FLD-CCNC: 16 U/L — SIGNIFICANT CHANGE UP
ANION GAP SERPL CALC-SCNC: 14 MMOL/L — SIGNIFICANT CHANGE UP (ref 5–17)
ANION GAP SERPL CALC-SCNC: 9 MMOL/L — SIGNIFICANT CHANGE UP (ref 5–17)
AST SERPL-CCNC: 18 U/L — SIGNIFICANT CHANGE UP
BASOPHILS # BLD AUTO: 0.03 K/UL — SIGNIFICANT CHANGE UP (ref 0–0.2)
BASOPHILS NFR BLD AUTO: 0.5 % — SIGNIFICANT CHANGE UP (ref 0–2)
BILIRUB SERPL-MCNC: 0.4 MG/DL — SIGNIFICANT CHANGE UP (ref 0.4–2)
BUN SERPL-MCNC: 6.7 MG/DL — LOW (ref 8–20)
BUN SERPL-MCNC: 8.1 MG/DL — SIGNIFICANT CHANGE UP (ref 8–20)
CALCIUM SERPL-MCNC: 8.1 MG/DL — LOW (ref 8.4–10.5)
CALCIUM SERPL-MCNC: 9 MG/DL — SIGNIFICANT CHANGE UP (ref 8.4–10.5)
CHLORIDE SERPL-SCNC: 106 MMOL/L — SIGNIFICANT CHANGE UP (ref 96–108)
CHLORIDE SERPL-SCNC: 109 MMOL/L — HIGH (ref 96–108)
CO2 SERPL-SCNC: 23 MMOL/L — SIGNIFICANT CHANGE UP (ref 22–29)
CO2 SERPL-SCNC: 26 MMOL/L — SIGNIFICANT CHANGE UP (ref 22–29)
CREAT SERPL-MCNC: 0.45 MG/DL — LOW (ref 0.5–1.3)
CREAT SERPL-MCNC: 0.6 MG/DL — SIGNIFICANT CHANGE UP (ref 0.5–1.3)
EGFR: 100 ML/MIN/1.73M2 — SIGNIFICANT CHANGE UP
EGFR: 94 ML/MIN/1.73M2 — SIGNIFICANT CHANGE UP
EOSINOPHIL # BLD AUTO: 0.18 K/UL — SIGNIFICANT CHANGE UP (ref 0–0.5)
EOSINOPHIL NFR BLD AUTO: 3.1 % — SIGNIFICANT CHANGE UP (ref 0–6)
GLUCOSE SERPL-MCNC: 89 MG/DL — SIGNIFICANT CHANGE UP (ref 70–99)
GLUCOSE SERPL-MCNC: 92 MG/DL — SIGNIFICANT CHANGE UP (ref 70–99)
HCT VFR BLD CALC: 38.3 % — SIGNIFICANT CHANGE UP (ref 34.5–45)
HGB BLD-MCNC: 12.6 G/DL — SIGNIFICANT CHANGE UP (ref 11.5–15.5)
IMM GRANULOCYTES NFR BLD AUTO: 0.5 % — SIGNIFICANT CHANGE UP (ref 0–0.9)
LYMPHOCYTES # BLD AUTO: 1.85 K/UL — SIGNIFICANT CHANGE UP (ref 1–3.3)
LYMPHOCYTES # BLD AUTO: 31.8 % — SIGNIFICANT CHANGE UP (ref 13–44)
MCHC RBC-ENTMCNC: 29 PG — SIGNIFICANT CHANGE UP (ref 27–34)
MCHC RBC-ENTMCNC: 32.9 GM/DL — SIGNIFICANT CHANGE UP (ref 32–36)
MCV RBC AUTO: 88.2 FL — SIGNIFICANT CHANGE UP (ref 80–100)
MONOCYTES # BLD AUTO: 0.66 K/UL — SIGNIFICANT CHANGE UP (ref 0–0.9)
MONOCYTES NFR BLD AUTO: 11.3 % — SIGNIFICANT CHANGE UP (ref 2–14)
NEUTROPHILS # BLD AUTO: 3.07 K/UL — SIGNIFICANT CHANGE UP (ref 1.8–7.4)
NEUTROPHILS NFR BLD AUTO: 52.8 % — SIGNIFICANT CHANGE UP (ref 43–77)
PLATELET # BLD AUTO: 307 K/UL — SIGNIFICANT CHANGE UP (ref 150–400)
POTASSIUM SERPL-MCNC: 2.7 MMOL/L — CRITICAL LOW (ref 3.5–5.3)
POTASSIUM SERPL-MCNC: 4.3 MMOL/L — SIGNIFICANT CHANGE UP (ref 3.5–5.3)
POTASSIUM SERPL-SCNC: 2.7 MMOL/L — CRITICAL LOW (ref 3.5–5.3)
POTASSIUM SERPL-SCNC: 4.3 MMOL/L — SIGNIFICANT CHANGE UP (ref 3.5–5.3)
PROT SERPL-MCNC: 5.2 G/DL — LOW (ref 6.6–8.7)
RBC # BLD: 4.34 M/UL — SIGNIFICANT CHANGE UP (ref 3.8–5.2)
RBC # FLD: 14.2 % — SIGNIFICANT CHANGE UP (ref 10.3–14.5)
SODIUM SERPL-SCNC: 141 MMOL/L — SIGNIFICANT CHANGE UP (ref 135–145)
SODIUM SERPL-SCNC: 146 MMOL/L — HIGH (ref 135–145)
WBC # BLD: 5.82 K/UL — SIGNIFICANT CHANGE UP (ref 3.8–10.5)
WBC # FLD AUTO: 5.82 K/UL — SIGNIFICANT CHANGE UP (ref 3.8–10.5)

## 2024-02-13 PROCEDURE — 36415 COLL VENOUS BLD VENIPUNCTURE: CPT

## 2024-02-13 PROCEDURE — 96365 THER/PROPH/DIAG IV INF INIT: CPT

## 2024-02-13 PROCEDURE — 83735 ASSAY OF MAGNESIUM: CPT

## 2024-02-13 PROCEDURE — G0378: CPT

## 2024-02-13 PROCEDURE — 84145 PROCALCITONIN (PCT): CPT

## 2024-02-13 PROCEDURE — 99285 EMERGENCY DEPT VISIT HI MDM: CPT | Mod: 25

## 2024-02-13 PROCEDURE — 85730 THROMBOPLASTIN TIME PARTIAL: CPT

## 2024-02-13 PROCEDURE — 96375 TX/PRO/DX INJ NEW DRUG ADDON: CPT

## 2024-02-13 PROCEDURE — 93005 ELECTROCARDIOGRAM TRACING: CPT

## 2024-02-13 PROCEDURE — 80048 BASIC METABOLIC PNL TOTAL CA: CPT

## 2024-02-13 PROCEDURE — 0225U NFCT DS DNA&RNA 21 SARSCOV2: CPT

## 2024-02-13 PROCEDURE — 87040 BLOOD CULTURE FOR BACTERIA: CPT

## 2024-02-13 PROCEDURE — 86140 C-REACTIVE PROTEIN: CPT

## 2024-02-13 PROCEDURE — 85610 PROTHROMBIN TIME: CPT

## 2024-02-13 PROCEDURE — 99239 HOSP IP/OBS DSCHRG MGMT >30: CPT

## 2024-02-13 PROCEDURE — G1004: CPT

## 2024-02-13 PROCEDURE — 85652 RBC SED RATE AUTOMATED: CPT

## 2024-02-13 PROCEDURE — 81001 URINALYSIS AUTO W/SCOPE: CPT

## 2024-02-13 PROCEDURE — 96376 TX/PRO/DX INJ SAME DRUG ADON: CPT

## 2024-02-13 PROCEDURE — 87086 URINE CULTURE/COLONY COUNT: CPT

## 2024-02-13 PROCEDURE — 71045 X-RAY EXAM CHEST 1 VIEW: CPT

## 2024-02-13 PROCEDURE — 85025 COMPLETE CBC W/AUTO DIFF WBC: CPT

## 2024-02-13 PROCEDURE — 83605 ASSAY OF LACTIC ACID: CPT

## 2024-02-13 PROCEDURE — 80053 COMPREHEN METABOLIC PANEL: CPT

## 2024-02-13 PROCEDURE — 74176 CT ABD & PELVIS W/O CONTRAST: CPT | Mod: ME

## 2024-02-13 RX ORDER — POTASSIUM CHLORIDE 20 MEQ
10 PACKET (EA) ORAL
Refills: 0 | Status: COMPLETED | OUTPATIENT
Start: 2024-02-13 | End: 2024-02-13

## 2024-02-13 RX ORDER — POTASSIUM CHLORIDE 20 MEQ
40 PACKET (EA) ORAL EVERY 4 HOURS
Refills: 0 | Status: COMPLETED | OUTPATIENT
Start: 2024-02-13 | End: 2024-02-13

## 2024-02-13 RX ORDER — POLYETHYLENE GLYCOL 3350 17 G/17G
17 POWDER, FOR SOLUTION ORAL
Qty: 0 | Refills: 0 | DISCHARGE
Start: 2024-02-13

## 2024-02-13 RX ADMIN — Medication 40 MILLIEQUIVALENT(S): at 10:21

## 2024-02-13 RX ADMIN — PIPERACILLIN AND TAZOBACTAM 25 GRAM(S): 4; .5 INJECTION, POWDER, LYOPHILIZED, FOR SOLUTION INTRAVENOUS at 05:05

## 2024-02-13 RX ADMIN — Medication 100 MICROGRAM(S): at 05:07

## 2024-02-13 RX ADMIN — Medication 100 MILLIEQUIVALENT(S): at 06:30

## 2024-02-13 RX ADMIN — Medication 15 MILLIGRAM(S): at 05:05

## 2024-02-13 RX ADMIN — ENOXAPARIN SODIUM 40 MILLIGRAM(S): 100 INJECTION SUBCUTANEOUS at 05:05

## 2024-02-13 RX ADMIN — OXYCODONE HYDROCHLORIDE 5 MILLIGRAM(S): 5 TABLET ORAL at 05:07

## 2024-02-13 RX ADMIN — Medication 40 MILLIEQUIVALENT(S): at 05:18

## 2024-02-13 RX ADMIN — DOCOSANOL 1 APPLICATION(S): 100 CREAM TOPICAL at 13:19

## 2024-02-13 RX ADMIN — Medication 650 MILLIGRAM(S): at 05:08

## 2024-02-13 RX ADMIN — OXYCODONE HYDROCHLORIDE 5 MILLIGRAM(S): 5 TABLET ORAL at 17:35

## 2024-02-13 RX ADMIN — Medication 100 MILLIEQUIVALENT(S): at 05:18

## 2024-02-13 RX ADMIN — Medication 100 MILLIEQUIVALENT(S): at 07:50
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
## 2024-02-13 NOTE — PHYSICAL THERAPY INITIAL EVALUATION ADULT - PERTINENT HX OF CURRENT PROBLEM, REHAB EVAL
73yoF hx adrenal insufficiency s/p L-adrenal gland resection for benign neoplasm complicated by post-operative bilateral PE (2022), s/p several month course of Xarelto, now off AC, presenting with abdominal pain, nausea, vomiting and watery diarrhea; on admission, pt with recurrent fevers, tachycardia, episodes of hypotension and CT showing questionable enterocolitis in terminal ileum and right colon

## 2024-02-13 NOTE — PHYSICAL THERAPY INITIAL EVALUATION ADULT - ADDITIONAL COMMENTS
pt states she lives with her spouse in a split level house with 3 + 3 steps to enter (+rail) then 5 up(+rail). has a cane and RW she  uses as needed. states spouse provides assist as needed.

## 2024-02-13 NOTE — DISCHARGE NOTE NURSING/CASE MANAGEMENT/SOCIAL WORK - NSDCPEFALRISK_GEN_ALL_CORE
For information on Fall & Injury Prevention, visit: [email or web address] OR  [email or web address] OR  https://www.cdc.gov/steadi/patient.html

## 2024-02-13 NOTE — DISCHARGE NOTE NURSING/CASE MANAGEMENT/SOCIAL WORK - PATIENT PORTAL LINK FT
You can access the FollowMyHealth Patient Portal offered by [facility] by registering at the following website: [email or web address]. By joining [facility]’s FollowMyHealth portal, you will also be able to view your health information using other applications (apps) compatible with our system.

## 2024-02-13 NOTE — PROGRESS NOTE ADULT - REASON FOR ADMISSION
Sepsis due to enterocolitis, hypotension

## 2024-02-13 NOTE — PROGRESS NOTE ADULT - SUBJECTIVE AND OBJECTIVE BOX
CC: Follow up     INTERVAL HPI/OVERNIGHT EVENTS: Patient seen and examined, no further episodes of nausea vomiting or diarrhea. Denies abdominal pain      Vital Signs Last 24 Hrs  T(C): 36.8 (12 Feb 2024 11:16), Max: 37 (11 Feb 2024 21:29)  T(F): 98.3 (12 Feb 2024 11:16), Max: 98.6 (11 Feb 2024 21:29)  HR: 77 (12 Feb 2024 11:16) (70 - 90)  BP: 115/65 (12 Feb 2024 11:16) (113/71 - 148/86)  BP(mean): --  RR: 18 (12 Feb 2024 11:16) (18 - 18)  SpO2: 98% (12 Feb 2024 11:16) (96% - 98%)    Parameters below as of 12 Feb 2024 11:16  Patient On (Oxygen Delivery Method): room air        PHYSICAL EXAM:    GENERAL: NAD, AOX3  HEAD:  Atraumatic, Normocephalic  EYES:  conjunctiva and sclera clear  ENMT: Moist mucous membranes  CHEST/LUNG: Clear to auscultation bilaterally; No rales, rhonchi, wheezing, or rubs  HEART: Regular rate and rhythm; No murmurs, rubs, or gallops  ABDOMEN: Soft, Nontender, Nondistended; Bowel sounds present  EXTREMITIES:  2+ Peripheral Pulses, No clubbing, cyanosis, trace pedal edema bilaterally         MEDICATIONS  (STANDING):  atorvastatin 10 milliGRAM(s) Oral at bedtime  enoxaparin Injectable 40 milliGRAM(s) SubCutaneous every 24 hours  ergocalciferol 43530 Unit(s) Oral <User Schedule>  hydrocortisone 15 milliGRAM(s) Oral daily  levothyroxine 100 MICROGram(s) Oral daily  oxyCODONE    IR 5 milliGRAM(s) Oral every 6 hours  piperacillin/tazobactam IVPB.. 3.375 Gram(s) IV Intermittent every 8 hours  sodium bicarbonate 650 milliGRAM(s) Oral three times a day    MEDICATIONS  (PRN):  acetaminophen     Tablet .. 650 milliGRAM(s) Oral every 6 hours PRN Temp greater or equal to 38C (100.4F), Mild Pain (1 - 3), Moderate Pain (4 - 6)  melatonin 3 milliGRAM(s) Oral at bedtime PRN Insomnia  ondansetron Injectable 4 milliGRAM(s) IV Push every 6 hours PRN Nausea and/or Vomiting      Allergies    Tequin (Unknown)  iodinated radiocontrast agents (Anaphylaxis; Angioedema)  sulfa drugs (Unknown)    Intolerances          LABS:                          12.7   5.94  )-----------( 246      ( 11 Feb 2024 07:22 )             40.7     02-12    144  |  111<H>  |  8.3  ----------------------------<  122<H>  3.5   |  20.0<L>  |  0.58    Ca    8.6      12 Feb 2024 06:55  Mg     2.0     02-12    TPro  5.5<L>  /  Alb  3.2<L>  /  TBili  0.3<L>  /  DBili  x   /  AST  19  /  ALT  19  /  AlkPhos  92  02-12      Urinalysis Basic - ( 12 Feb 2024 06:55 )    Color: x / Appearance: x / SG: x / pH: x  Gluc: 122 mg/dL / Ketone: x  / Bili: x / Urobili: x   Blood: x / Protein: x / Nitrite: x   Leuk Esterase: x / RBC: x / WBC x   Sq Epi: x / Non Sq Epi: x / Bacteria: x        RADIOLOGY & ADDITIONAL TESTS:  
CC: Follow up     INTERVAL HPI/OVERNIGHT EVENTS: Patient seen and examined, no further episodes of vomiting or diarrhea since last night. Generalized abdominal cramping       Vital Signs Last 24 Hrs  T(C): 36.6 (11 Feb 2024 09:57), Max: 38.1 (10 Feb 2024 15:47)  T(F): 97.9 (11 Feb 2024 09:57), Max: 100.6 (10 Feb 2024 15:47)  HR: 75 (11 Feb 2024 09:57) (75 - 120)  BP: 101/67 (11 Feb 2024 09:57) (82/56 - 134/56)  BP(mean): --  RR: 18 (11 Feb 2024 09:57) (15 - 20)  SpO2: 97% (11 Feb 2024 09:57) (95% - 100%)    Parameters below as of 11 Feb 2024 09:57  Patient On (Oxygen Delivery Method): room air        PHYSICAL EXAM:    GENERAL: NAD, AOX3  HEAD:  Atraumatic, Normocephalic  EYES:  conjunctiva and sclera clear  ENMT: Moist mucous membranes  NECK: Supple  CHEST/LUNG: Clear to auscultation bilaterally; No rales, rhonchi, wheezing, or rubs  HEART: Regular rate and rhythm; No murmurs, rubs, or gallops  ABDOMEN: Soft, generalized tenderness , Nondistended; Bowel sounds present  EXTREMITIES:  2+ Peripheral Pulses, No clubbing, cyanosis, or edema        MEDICATIONS  (STANDING):  atorvastatin 10 milliGRAM(s) Oral at bedtime  enoxaparin Injectable 40 milliGRAM(s) SubCutaneous every 24 hours  ergocalciferol 45715 Unit(s) Oral <User Schedule>  hydrocortisone sodium succinate Injectable 50 milliGRAM(s) IV Push every 6 hours  levothyroxine 100 MICROGram(s) Oral daily  oxyCODONE    IR 5 milliGRAM(s) Oral every 6 hours  piperacillin/tazobactam IVPB.. 3.375 Gram(s) IV Intermittent every 8 hours  potassium chloride  10 mEq/100 mL IVPB 10 milliEquivalent(s) IV Intermittent every 1 hour  sodium bicarbonate 650 milliGRAM(s) Oral three times a day    MEDICATIONS  (PRN):  acetaminophen     Tablet .. 650 milliGRAM(s) Oral every 6 hours PRN Temp greater or equal to 38C (100.4F), Mild Pain (1 - 3), Moderate Pain (4 - 6)  melatonin 3 milliGRAM(s) Oral at bedtime PRN Insomnia  ondansetron Injectable 4 milliGRAM(s) IV Push every 6 hours PRN Nausea and/or Vomiting      Allergies    Tequin (Unknown)  iodinated radiocontrast agents (Anaphylaxis; Angioedema)  sulfa drugs (Unknown)    Intolerances          LABS:                          12.7   5.94  )-----------( 246      ( 11 Feb 2024 07:22 )             40.7     02-11    143  |  111<H>  |  5.4<L>  ----------------------------<  150<H>  3.0<L>   |  15.0<L>  |  0.44<L>    Ca    8.1<L>      11 Feb 2024 07:22    TPro  6.9  /  Alb  3.7  /  TBili  0.4  /  DBili  x   /  AST  32<H>  /  ALT  23  /  AlkPhos  108  02-10    PT/INR - ( 10 Feb 2024 08:30 )   PT: 10.9 sec;   INR: 0.98 ratio         PTT - ( 10 Feb 2024 08:30 )  PTT:24.3 sec  Urinalysis Basic - ( 11 Feb 2024 07:22 )    Color: x / Appearance: x / SG: x / pH: x  Gluc: 150 mg/dL / Ketone: x  / Bili: x / Urobili: x   Blood: x / Protein: x / Nitrite: x   Leuk Esterase: x / RBC: x / WBC x   Sq Epi: x / Non Sq Epi: x / Bacteria: x        RADIOLOGY & ADDITIONAL TESTS:  
CC: Follow up     INTERVAL HPI/OVERNIGHT EVENTS: Patient seen and examined, denies nausea vomiting or abdominal pain. Had an episode of loose stool yesterday       Vital Signs Last 24 Hrs  T(C): 37.1 (13 Feb 2024 07:30), Max: 37.1 (12 Feb 2024 15:26)  T(F): 98.8 (13 Feb 2024 07:30), Max: 98.8 (13 Feb 2024 04:39)  HR: 98 (13 Feb 2024 07:30) (74 - 98)  BP: 144/83 (13 Feb 2024 07:30) (127/80 - 155/94)  BP(mean): --  RR: 18 (13 Feb 2024 07:30) (18 - 18)  SpO2: 96% (13 Feb 2024 07:30) (96% - 99%)    Parameters below as of 13 Feb 2024 07:30  Patient On (Oxygen Delivery Method): room air        PHYSICAL EXAM:    GENERAL: NAD, AOX3  HEAD:  Atraumatic, Normocephalic  EYES:  conjunctiva and sclera clear  ENMT: Moist mucous membranes  CHEST/LUNG: Clear to auscultation bilaterally; No rales, rhonchi, wheezing, or rubs  HEART: Regular rate and rhythm; No murmurs, rubs, or gallops  ABDOMEN: Soft, Nontender, Nondistended; Bowel sounds present  EXTREMITIES:  2+ Peripheral Pulses, No clubbing, cyanosis, or edema        MEDICATIONS  (STANDING):  atorvastatin 10 milliGRAM(s) Oral at bedtime  enoxaparin Injectable 40 milliGRAM(s) SubCutaneous every 24 hours  ergocalciferol 28286 Unit(s) Oral <User Schedule>  hydrocortisone 15 milliGRAM(s) Oral daily  levothyroxine 100 MICROGram(s) Oral daily  oxyCODONE    IR 5 milliGRAM(s) Oral every 6 hours  polyethylene glycol 3350 17 Gram(s) Oral daily    MEDICATIONS  (PRN):  acetaminophen     Tablet .. 650 milliGRAM(s) Oral every 6 hours PRN Temp greater or equal to 38C (100.4F), Mild Pain (1 - 3), Moderate Pain (4 - 6)  docosanol 10% Cream 1 Application(s) Topical three times a day PRN cold sore pain  melatonin 3 milliGRAM(s) Oral at bedtime PRN Insomnia  ondansetron Injectable 4 milliGRAM(s) IV Push every 6 hours PRN Nausea and/or Vomiting      Allergies    Tequin (Unknown)  iodinated radiocontrast agents (Anaphylaxis; Angioedema)  sulfa drugs (Unknown)    Intolerances          LABS:                          12.6   5.82  )-----------( 307      ( 13 Feb 2024 03:37 )             38.3     02-13    141  |  106  |  6.7<L>  ----------------------------<  89  4.3   |  26.0  |  0.45<L>    Ca    9.0      13 Feb 2024 13:20  Mg     2.0     02-12    TPro  5.2<L>  /  Alb  2.9<L>  /  TBili  0.4  /  DBili  x   /  AST  18  /  ALT  16  /  AlkPhos  74  02-13      Urinalysis Basic - ( 13 Feb 2024 13:20 )    Color: x / Appearance: x / SG: x / pH: x  Gluc: 89 mg/dL / Ketone: x  / Bili: x / Urobili: x   Blood: x / Protein: x / Nitrite: x   Leuk Esterase: x / RBC: x / WBC x   Sq Epi: x / Non Sq Epi: x / Bacteria: x        RADIOLOGY & ADDITIONAL TESTS:

## 2024-02-15 LAB
CULTURE RESULTS: SIGNIFICANT CHANGE UP
CULTURE RESULTS: SIGNIFICANT CHANGE UP
SPECIMEN SOURCE: SIGNIFICANT CHANGE UP
SPECIMEN SOURCE: SIGNIFICANT CHANGE UP

## 2024-02-20 ENCOUNTER — APPOINTMENT (OUTPATIENT)
Dept: ENDOCRINOLOGY | Facility: CLINIC | Age: 74
End: 2024-02-20
Payer: MEDICARE

## 2024-02-20 VITALS — BODY MASS INDEX: 32.25 KG/M2 | OXYGEN SATURATION: 98 % | HEIGHT: 63 IN | HEART RATE: 102 BPM | WEIGHT: 182 LBS

## 2024-02-20 VITALS — SYSTOLIC BLOOD PRESSURE: 130 MMHG | DIASTOLIC BLOOD PRESSURE: 72 MMHG

## 2024-02-20 PROBLEM — Z86.39 PERSONAL HISTORY OF OTHER ENDOCRINE, NUTRITIONAL AND METABOLIC DISEASE: Chronic | Status: ACTIVE | Noted: 2024-02-11

## 2024-02-20 PROBLEM — I26.99 OTHER PULMONARY EMBOLISM WITHOUT ACUTE COR PULMONALE: Chronic | Status: ACTIVE | Noted: 2024-02-11

## 2024-02-20 PROCEDURE — G2211 COMPLEX E/M VISIT ADD ON: CPT

## 2024-02-20 PROCEDURE — 99215 OFFICE O/P EST HI 40 MIN: CPT

## 2024-02-20 RX ORDER — HYDROCORTISONE 20 MG/1
20 TABLET ORAL
Qty: 90 | Refills: 2 | Status: ACTIVE | COMMUNITY
Start: 2024-02-20 | End: 1900-01-01

## 2024-02-20 NOTE — ASSESSMENT
[FreeTextEntry1] : s/p acute adrenal insufficiency provoked by illness lab review shows prior elevation in ACTH, but no elevation of cortisol. Possible that the remaining adrenal gland is dysfunctional. Will abandon plan to taper off hydrocortisone as risks are greater than benefits; increase to 20 mg in am and 10 mg in PM. No contraindication therefore to repeated epidural injections if necessary. Euthyroid on replacement

## 2024-02-20 NOTE — HISTORY OF PRESENT ILLNESS
[FreeTextEntry1] : see summary note from surgery. 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 Persistent secondary adrenal insufficiency. osteopenia pulmonary emboli hypothyroid hyperlipidemia  Pt. recently had a series of injections into back  lab review:  date         cortisol         ACTH 3/2022     1 4/2022     1.1              18 5/2022     1.2 9/2022     1.1               25 11/2022   1.2               51 12/2022   1.4               32 1/2023     1.7              113 2/2023     1.8               88 3/2023     0.8              <5 1/2024     0.9              22  hospitalized with acute adrenal insufficiency associated with gastroenteritis

## 2024-02-23 ENCOUNTER — APPOINTMENT (OUTPATIENT)
Dept: OBGYN | Facility: CLINIC | Age: 74
End: 2024-02-23
Payer: MEDICARE

## 2024-02-23 VITALS
BODY MASS INDEX: 32.6 KG/M2 | WEIGHT: 184 LBS | HEIGHT: 63 IN | DIASTOLIC BLOOD PRESSURE: 70 MMHG | SYSTOLIC BLOOD PRESSURE: 100 MMHG

## 2024-02-23 DIAGNOSIS — R30.0 DYSURIA: ICD-10-CM

## 2024-02-23 DIAGNOSIS — L25.8 UNSPECIFIED CONTACT DERMATITIS DUE TO OTHER AGENTS: ICD-10-CM

## 2024-02-23 PROCEDURE — 99213 OFFICE O/P EST LOW 20 MIN: CPT

## 2024-02-23 NOTE — PHYSICAL EXAM
[Chaperone Present] : A chaperone was present in the examining room during all aspects of the physical examination [FreeTextEntry1] : rash of the perineum

## 2024-02-23 NOTE — HISTORY OF PRESENT ILLNESS
[FreeTextEntry1] : 73 year old female presents for acute appointment. Pt. recently seen at [facility] where she was hospitalized for 4 days where she was incontinent of urine and feces where she states she was never changed. She complains of terrible pain, soreness and redness of her vagina, rectum and groin.

## 2024-02-23 NOTE — PLAN
[FreeTextEntry1] : F/U in 2 weeks for further evaluation  All questions and concerns addressed during encounter. Pt. agreed to plan of care.

## 2024-02-26 PROBLEM — R30.0 DYSURIA: Status: ACTIVE | Noted: 2024-02-26

## 2024-03-07 ENCOUNTER — APPOINTMENT (OUTPATIENT)
Dept: OBGYN | Facility: CLINIC | Age: 74
End: 2024-03-07
Payer: MEDICARE

## 2024-03-07 VITALS
SYSTOLIC BLOOD PRESSURE: 128 MMHG | HEIGHT: 63 IN | DIASTOLIC BLOOD PRESSURE: 80 MMHG | BODY MASS INDEX: 32.6 KG/M2 | WEIGHT: 184 LBS

## 2024-03-07 DIAGNOSIS — L25.9 UNSPECIFIED CONTACT DERMATITIS, UNSPECIFIED CAUSE: ICD-10-CM

## 2024-03-07 PROCEDURE — 99213 OFFICE O/P EST LOW 20 MIN: CPT

## 2024-03-07 NOTE — PHYSICAL EXAM
[Chaperone Present] : A chaperone was present in the examining room during all aspects of the physical examination [Appropriately responsive] : appropriately responsive [Alert] : alert [No Acute Distress] : no acute distress [No Murmurs] : no murmurs [Oriented x3] : oriented x3 [Vulvar Atrophy] : vulvar atrophy [Labia Majora] : normal [Labia Minora] : normal [Atrophy] : atrophy [Normal] : normal [Uterine Adnexae] : normal

## 2024-03-19 ENCOUNTER — NON-APPOINTMENT (OUTPATIENT)
Age: 74
End: 2024-03-19

## 2024-03-26 ENCOUNTER — APPOINTMENT (OUTPATIENT)
Dept: ENDOCRINOLOGY | Facility: CLINIC | Age: 74
End: 2024-03-26
Payer: MEDICARE

## 2024-03-26 VITALS
WEIGHT: 185 LBS | HEART RATE: 67 BPM | OXYGEN SATURATION: 96 % | DIASTOLIC BLOOD PRESSURE: 76 MMHG | BODY MASS INDEX: 32.78 KG/M2 | HEIGHT: 63 IN | SYSTOLIC BLOOD PRESSURE: 118 MMHG

## 2024-03-26 DIAGNOSIS — E03.9 HYPOTHYROIDISM, UNSPECIFIED: ICD-10-CM

## 2024-03-26 DIAGNOSIS — E27.49 OTHER ADRENOCORTICAL INSUFFICIENCY: ICD-10-CM

## 2024-03-26 PROCEDURE — G2211 COMPLEX E/M VISIT ADD ON: CPT

## 2024-03-26 PROCEDURE — 99214 OFFICE O/P EST MOD 30 MIN: CPT

## 2024-03-26 NOTE — HISTORY OF PRESENT ILLNESS
[FreeTextEntry1] : see summary note from surgery. 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 Persistent secondary adrenal insufficiency. osteopenia pulmonary emboli hypothyroid hyperlipidemia  Pt. recently had a series of injections into back  lab review:  date         cortisol         ACTH 3/2022     1 4/2022     1.1              18 5/2022     1.2 9/2022     1.1               25 11/2022   1.2               51 12/2022   1.4               32 1/2023     1.7              113 2/2023     1.8               88 3/2023     0.8              <5 1/2024     0.9              22  hospitalized with acute adrenal insufficiency associated with gastroenteritis no further attempts to taper off hydrocortisone now c/o weight gain. Appetite good, no nausea or vomiting. Still has sweats. Lightheaded on awakening, but no vertigo. lasting seconds.

## 2024-04-15 NOTE — PHYSICAL THERAPY INITIAL EVALUATION ADULT - STAIR PATTERN, REHAB EVAL
Handoff received from Crystal Gabriel for break coverage, pt. in bed awake and alert states decrease in pain level, IV Toradol infusing as per MD orders. Safety measures maintained. Patient is resting comfortably in stretcher with family at bedside. Respirations even and unlabored. Vitals obtained and documented, no acute distress noted. Purposeful rounding completed. Call bell in reach. Safety precautions maintained. Pt verbalized pain improvement. step to step

## 2024-07-01 ENCOUNTER — APPOINTMENT (OUTPATIENT)
Dept: UROLOGY | Facility: CLINIC | Age: 74
End: 2024-07-01
Payer: MEDICARE

## 2024-07-01 VITALS
HEART RATE: 73 BPM | WEIGHT: 197 LBS | DIASTOLIC BLOOD PRESSURE: 50 MMHG | BODY MASS INDEX: 34.91 KG/M2 | HEIGHT: 63 IN | SYSTOLIC BLOOD PRESSURE: 114 MMHG

## 2024-07-01 PROBLEM — R35.1 NOCTURIA: Status: ACTIVE | Noted: 2023-11-27

## 2024-07-01 PROCEDURE — 99213 OFFICE O/P EST LOW 20 MIN: CPT

## 2024-07-01 RX ORDER — MECLIZINE HYDROCHLORIDE 25 MG/1
25 TABLET ORAL
Qty: 20 | Refills: 0 | Status: COMPLETED | COMMUNITY
Start: 2024-01-05

## 2024-07-01 RX ORDER — ASPIRIN 81 MG/1
81 TABLET, COATED ORAL
Qty: 90 | Refills: 0 | Status: ACTIVE | COMMUNITY
Start: 2024-06-27

## 2024-07-01 RX ORDER — CLOPIDOGREL BISULFATE 75 MG/1
75 TABLET, FILM COATED ORAL
Qty: 90 | Refills: 0 | Status: ACTIVE | COMMUNITY
Start: 2024-06-27

## 2024-07-01 RX ORDER — AMLODIPINE BESYLATE 10 MG/1
10 TABLET ORAL
Qty: 90 | Refills: 0 | Status: ACTIVE | COMMUNITY
Start: 2024-06-14

## 2024-07-01 RX ORDER — ERGOCALCIFEROL 1.25 MG/1
1.25 MG CAPSULE, LIQUID FILLED ORAL
Qty: 12 | Refills: 0 | Status: ACTIVE | COMMUNITY
Start: 2024-06-14

## 2024-07-01 RX ORDER — DULOXETINE HYDROCHLORIDE 20 MG/1
20 CAPSULE, DELAYED RELEASE PELLETS ORAL
Qty: 90 | Refills: 0 | Status: ACTIVE | COMMUNITY
Start: 2024-04-19

## 2024-07-01 RX ORDER — PREDNISONE 50 MG/1
50 TABLET ORAL
Qty: 5 | Refills: 0 | Status: ACTIVE | COMMUNITY
Start: 2024-06-07

## 2024-07-01 RX ORDER — OXYCODONE HYDROCHLORIDE 5 MG/1
5 CAPSULE ORAL
Qty: 30 | Refills: 0 | Status: ACTIVE | COMMUNITY
Start: 2024-02-22

## 2024-07-16 ENCOUNTER — RX RENEWAL (OUTPATIENT)
Age: 74
End: 2024-07-16

## 2024-07-22 ENCOUNTER — APPOINTMENT (OUTPATIENT)
Dept: UROLOGY | Facility: CLINIC | Age: 74
End: 2024-07-22
Payer: MEDICARE

## 2024-07-22 DIAGNOSIS — R35.1 NOCTURIA: ICD-10-CM

## 2024-07-22 PROCEDURE — 52000 CYSTOURETHROSCOPY: CPT

## 2024-07-29 ENCOUNTER — APPOINTMENT (OUTPATIENT)
Dept: UROLOGY | Facility: CLINIC | Age: 74
End: 2024-07-29
Payer: MEDICARE

## 2024-07-29 VITALS
SYSTOLIC BLOOD PRESSURE: 109 MMHG | HEIGHT: 63 IN | WEIGHT: 197 LBS | HEART RATE: 72 BPM | BODY MASS INDEX: 34.91 KG/M2 | DIASTOLIC BLOOD PRESSURE: 64 MMHG

## 2024-07-29 PROCEDURE — 99212 OFFICE O/P EST SF 10 MIN: CPT

## 2024-07-29 RX ORDER — ASPIRIN ENTERIC COATED TABLETS 81 MG 81 MG/1
81 TABLET, DELAYED RELEASE ORAL
Refills: 0 | Status: ACTIVE | COMMUNITY
Start: 2024-07-29

## 2024-07-29 NOTE — ASSESSMENT
[FreeTextEntry1] : Nocturia  Nocturia better, she has tried to taper fluids at night RTO as needed, worsening urinary symptoms

## 2024-07-29 NOTE — PHYSICAL EXAM
[General Appearance - Well Developed] : well developed [General Appearance - Well Nourished] : well nourished [Normal Appearance] : normal appearance [Well Groomed] : well groomed [General Appearance - In No Acute Distress] : no acute distress [Respiration, Rhythm And Depth] : normal respiratory rhythm and effort [Exaggerated Use Of Accessory Muscles For Inspiration] : no accessory muscle use [Normal Station and Gait] : the gait and station were normal for the patient's age [Skin Color & Pigmentation] : normal skin color and pigmentation [] : no rash [Oriented To Time, Place, And Person] : oriented to person, place, and time [Affect] : the affect was normal [Mood] : the mood was normal [Not Anxious] : not anxious

## 2024-07-29 NOTE — HISTORY OF PRESENT ILLNESS
[FreeTextEntry1] : Pt reports nocturia is better, it sometimes just depends on what she is drinking at night. She wakes up 2x/night. She c/o vaginal burning and blood on toilet paper after her Cysto and but it has since resolved.

## 2024-09-18 ENCOUNTER — RX RENEWAL (OUTPATIENT)
Age: 74
End: 2024-09-18

## 2024-10-15 ENCOUNTER — OFFICE (OUTPATIENT)
Dept: URBAN - METROPOLITAN AREA CLINIC 115 | Facility: CLINIC | Age: 74
Setting detail: OPHTHALMOLOGY
End: 2024-10-15
Payer: MEDICARE

## 2024-10-15 ENCOUNTER — APPOINTMENT (OUTPATIENT)
Dept: ENDOCRINOLOGY | Facility: CLINIC | Age: 74
End: 2024-10-15
Payer: MEDICARE

## 2024-10-15 VITALS
DIASTOLIC BLOOD PRESSURE: 80 MMHG | HEART RATE: 85 BPM | BODY MASS INDEX: 34.02 KG/M2 | OXYGEN SATURATION: 98 % | HEIGHT: 63 IN | SYSTOLIC BLOOD PRESSURE: 124 MMHG | WEIGHT: 192 LBS

## 2024-10-15 DIAGNOSIS — H17.823: ICD-10-CM

## 2024-10-15 DIAGNOSIS — E03.9 HYPOTHYROIDISM, UNSPECIFIED: ICD-10-CM

## 2024-10-15 DIAGNOSIS — H01.001: ICD-10-CM

## 2024-10-15 DIAGNOSIS — H10.45: ICD-10-CM

## 2024-10-15 DIAGNOSIS — H35.033: ICD-10-CM

## 2024-10-15 DIAGNOSIS — H02.831: ICD-10-CM

## 2024-10-15 DIAGNOSIS — H01.004: ICD-10-CM

## 2024-10-15 DIAGNOSIS — E27.49 OTHER ADRENOCORTICAL INSUFFICIENCY: ICD-10-CM

## 2024-10-15 DIAGNOSIS — Z96.1: ICD-10-CM

## 2024-10-15 DIAGNOSIS — H02.834: ICD-10-CM

## 2024-10-15 DIAGNOSIS — Z79.84: ICD-10-CM

## 2024-10-15 DIAGNOSIS — H26.492: ICD-10-CM

## 2024-10-15 PROBLEM — H26.491 POSTERIOR CAPSULAR OPACIFICATION; RIGHT EYE: Status: ACTIVE | Noted: 2024-10-15

## 2024-10-15 PROCEDURE — 92014 COMPRE OPH EXAM EST PT 1/>: CPT | Performed by: OPHTHALMOLOGY

## 2024-10-15 PROCEDURE — 99214 OFFICE O/P EST MOD 30 MIN: CPT

## 2024-10-15 PROCEDURE — G2211 COMPLEX E/M VISIT ADD ON: CPT

## 2024-10-15 RX ORDER — EVOLOCUMAB 140 MG/ML
140 INJECTION, SOLUTION SUBCUTANEOUS
Refills: 0 | Status: ACTIVE | COMMUNITY

## 2024-10-15 ASSESSMENT — REFRACTION_MANIFEST
OS_ADD: +2.50
OD_SPHERE: -0.25
OD_AXIS: 25
OD_VA1: 20/20-2
OS_CYLINDER: -0.75
OD_AXIS: 025
OD_ADD: +2.50
OU_VA: 20/20
OS_VA1: 20/20-2
OS_VA1: 20/20-2
OS_CYLINDER: -0.75
OD_ADD: +2.50
OD_VA1: 20/20-2
OS_SPHERE: PL
OD_SPHERE: +0.25
OD_VA2: 20/20
OD_CYLINDER: -0.75
OS_SPHERE: -0.50
OS_AXIS: 150
OU_VA: 20/20-2
OD_CYLINDER: -0.75
OS_AXIS: 155
OS_VA2: 20/20
OS_ADD: +2.50

## 2024-10-15 ASSESSMENT — LID POSITION - DERMATOCHALASIS
OS_DERMATOCHALASIS: LUL 1+ 2+
OD_DERMATOCHALASIS: RUL 1+ 2+

## 2024-10-15 ASSESSMENT — TONOMETRY
OD_IOP_MMHG: 13
OS_IOP_MMHG: 10

## 2024-10-15 ASSESSMENT — REFRACTION_CURRENTRX
OD_CYLINDER: -0.75
OS_SPHERE: -0.25
OS_VPRISM_DIRECTION: SV
OS_SPHERE: -0.25
OD_CYLINDER: -0.75
OS_AXIS: 051
OS_CYLINDER: -0.50
OS_CYLINDER: -0.50
OD_VPRISM_DIRECTION: SV
OD_AXIS: 041
OS_VPRISM_DIRECTION: SV
OD_OVR_VA: 20/
OD_VPRISM_DIRECTION: SV
OD_AXIS: 022
OS_SPHERE: PLANO
OD_SPHERE: PLANO
OS_OVR_VA: 20/
OS_AXIS: 141
OS_AXIS: 119
OD_OVR_VA: 20/
OD_AXIS: 025
OD_SPHERE: +0.25
OD_CYLINDER: -1.00
OS_VPRISM_DIRECTION: SV
OD_SPHERE: +0.25
OS_OVR_VA: 20/
OS_OVR_VA: 20/
OS_CYLINDER: -0.25
OD_OVR_VA: 20/
OD_VPRISM_DIRECTION: SV

## 2024-10-15 ASSESSMENT — CONFRONTATIONAL VISUAL FIELD TEST (CVF)
OS_FINDINGS: FULL
OD_FINDINGS: FULL

## 2024-10-15 ASSESSMENT — REFRACTION_AUTOREFRACTION
OD_AXIS: 063
OD_SPHERE: +1.25
OS_CYLINDER: -0.75
OS_AXIS: 114
OD_CYLINDER: -1.50
OS_SPHERE: +0.50

## 2024-10-15 ASSESSMENT — CORNEAL SURGICAL SCARRING
OS_SCARRING: MID PERIPHERAL ANTERIOR STROMAL
OD_SCARRING: MID PERIPHERAL ANTERIOR STROMAL

## 2024-10-15 ASSESSMENT — LID EXAM ASSESSMENTS
OS_BLEPHARITIS: LUL 1+
OD_MEIBOMITIS: RUL 1+
OD_BLEPHARITIS: RUL 1+
OS_MEIBOMITIS: LUL 1+

## 2024-10-15 ASSESSMENT — VISUAL ACUITY
OD_BCVA: 20/25-1
OS_BCVA: 20/30+2

## 2024-10-26 NOTE — ED ADULT NURSE NOTE - NS ED NURSE LEVEL OF CONSCIOUSNESS SPEECH
[facility] Progress Note      Patient Name: Arden Carrasquillo  : 1955  MRN: [id number]  Primary Care Physician:  Pollo Ward MD  Date of admission: 10/24/2024    Subjective     Interval History:     Patient resting comfortably complaining of back pain    Review of Systems:   As noted above    Objective     Vitals:   Temp:  [97.7 °F (36.5 °C)-98.6 °F (37 °C)] 98.6 °F (37 °C)  Heart Rate:  [71-81] 75  Resp:  [16-18] 18  BP: (129-146)/(72-84) 137/78  Flow (L/min) (Oxygen Therapy):  [2] 2    Intake/Output Summary (Last 24 hours) at 10/26/2024 0932  Last data filed at 10/26/2024 0100  Gross per 24 hour   Intake --   Output 2800 ml   Net -2800 ml       Physical Exam:      General Appearance: frail pleasant conversant AAM on NC O2 no distress  Skin: warm and dry  HEENT: oral mucosa normal, nonicteric sclera  Neck: supple, no JVD  Lungs: CTA bilat no rales  Heart: RRR, normal S1 and S2  Abdomen: soft, nontender, nondistended   dash with clear urine   Extremities: no edema, cyanosis or clubbing  Neuro: normal speech and mental status     Scheduled Meds:     vitamin C, 500 mg, Oral, Daily  atorvastatin, 10 mg, Oral, Daily  castor oil-balsam peru, 1 Application, Topical, Q12H  heparin (porcine), 5,000 Units, Subcutaneous, Q12H  pantoprazole, 40 mg, Oral, Daily  polyethylene glycol, 17 g, Oral, Daily  sodium bicarbonate, 650 mg, Oral, BID  sodium hypochlorite, , Topical, BID  tamsulosin, 0.4 mg, Oral, Daily      IV Meds:        Results Reviewed:   I have personally reviewed the results from the time of this admission to 10/26/2024 09:32 EDT     Results from last 7 days   Lab Units 10/26/24  0312 10/25/24  0300 10/24/24  2139 10/24/24  1251   SODIUM mmol/L 143 140 141 135*   POTASSIUM mmol/L 4.8 4.9 4.8 5.3*   CHLORIDE mmol/L 112* 109* 110* 103   CO2 mmol/L 22.0 17.0* 18.0* 20.5*   BUN mg/dL 43* 61* 66* 72*   CREATININE mg/dL 1.72* 3.03* 3.60* 4.29*   CALCIUM mg/dL 8.7 8.3* 8.0* 8.5*    BILIRUBIN mg/dL  --   --   --  0.3   ALK PHOS U/L  --   --   --  106   ALT (SGPT) U/L  --   --   --  13   AST (SGOT) U/L  --   --   --  13   GLUCOSE mg/dL 147* 128* 137* 126*     Estimated Creatinine Clearance: 42 mL/min (A) (by C-G formula based on SCr of 1.72 mg/dL (H)).  Results from last 7 days   Lab Units 10/26/24  0312 10/24/24  1251   MAGNESIUM mg/dL  --  2.5*   PHOSPHORUS mg/dL 2.6  --          Results from last 7 days   Lab Units 10/26/24  0312 10/25/24  0300 10/24/24  1251   WBC 10*3/mm3 7.80 9.58 9.11   HEMOGLOBIN g/dL 10.3* 11.0* 9.9*   PLATELETS 10*3/mm3 271 266 283     Results from last 7 days   Lab Units 10/24/24  1251   INR  1.24*       Assessment / Plan     ASSESSMENT:    Acute kidney injury on chronic kidney disease stage IIIb.  Prerenal azotemia due to volume depletion with hypotension and diuretic use.  Nonoliguric.  Renal function improving creatinine 1.7 this morning from peak of 4.29 Mg/DL.  Electrolytes okay  Hyperkalemia.  Resolved  Metabolic acidosis.  Resolved  Hypotension.  Resolved with IV fluids  Recent urinary retention and hydronephrosis.  Armendariz catheter in place.  Hydronephrosis resolved now  History of diastolic CHF.  Patient stable hemodynamically    PLAN:  Keep off antihypertensive and torsemide  Discontinue sodium bicarbonate   repeat labs in a.m.    Bala Wright MD  10/26/24  09:32 EDT    [facility]  [phone]              Speaking Coherently

## 2025-01-11 ENCOUNTER — RX RENEWAL (OUTPATIENT)
Age: 75
End: 2025-01-11

## 2025-01-11 RX ORDER — LEVOTHYROXINE SODIUM 0.1 MG/1
100 TABLET ORAL
Qty: 90 | Refills: 1 | Status: ACTIVE | COMMUNITY
Start: 2025-01-11 | End: 1900-01-01

## 2025-03-14 ENCOUNTER — APPOINTMENT (OUTPATIENT)
Dept: ENDOCRINOLOGY | Facility: CLINIC | Age: 75
End: 2025-03-14
Payer: MEDICARE

## 2025-03-14 VITALS
DIASTOLIC BLOOD PRESSURE: 78 MMHG | HEART RATE: 81 BPM | BODY MASS INDEX: 33.31 KG/M2 | OXYGEN SATURATION: 98 % | HEIGHT: 63 IN | WEIGHT: 188 LBS | SYSTOLIC BLOOD PRESSURE: 134 MMHG

## 2025-03-14 DIAGNOSIS — E89.6 POSTPROCEDURAL ADRENOCORTICAL (-MEDULLARY) HYPOFUNCTION: ICD-10-CM

## 2025-03-14 DIAGNOSIS — R61 GENERALIZED HYPERHIDROSIS: ICD-10-CM

## 2025-03-14 DIAGNOSIS — E27.49 OTHER ADRENOCORTICAL INSUFFICIENCY: ICD-10-CM

## 2025-03-14 DIAGNOSIS — E03.9 HYPOTHYROIDISM, UNSPECIFIED: ICD-10-CM

## 2025-03-14 DIAGNOSIS — Z13.820 ENCOUNTER FOR SCREENING FOR OSTEOPOROSIS: ICD-10-CM

## 2025-03-14 PROCEDURE — 99214 OFFICE O/P EST MOD 30 MIN: CPT

## 2025-03-14 PROCEDURE — G2211 COMPLEX E/M VISIT ADD ON: CPT

## 2025-05-24 NOTE — ED PROVIDER NOTE - ATTENDING CONTRIBUTION TO CARE
oral I personally saw the patient with the resident, and completed the key components of the history and physical exam. I then discussed the management plan with the resident.    72 y/o F with recent left adrenal adenoma s/p resection presents with right pleuritic chest pain, LE edema L > R and low grade temperature this morning from rehab facility. Denies active chest pain unless she takes a deep breath or moves in certain positions.    AP - Tachycardic, lungs clear, splinted respirations, tachypneic, abd soft, NT/ND, pulses 2+ and symmetrical, bilateral LE edema with bilateral TTP.    Will evaluate for cardiac, infectious or pulmonary cause - concern for PE due to recent surgery and asymmetrical LE edema. Will require admission.

## 2025-06-13 ENCOUNTER — APPOINTMENT (OUTPATIENT)
Dept: ENDOCRINOLOGY | Facility: CLINIC | Age: 75
End: 2025-06-13

## 2025-06-25 ENCOUNTER — RX RENEWAL (OUTPATIENT)
Age: 75
End: 2025-06-25

## 2025-07-03 ENCOUNTER — RX RENEWAL (OUTPATIENT)
Age: 75
End: 2025-07-03

## 2025-09-16 ENCOUNTER — NON-APPOINTMENT (OUTPATIENT)
Age: 75
End: 2025-09-16

## 2025-09-17 ENCOUNTER — APPOINTMENT (OUTPATIENT)
Dept: ENDOCRINOLOGY | Facility: CLINIC | Age: 75
End: 2025-09-17
Payer: MEDICARE

## 2025-09-17 VITALS
DIASTOLIC BLOOD PRESSURE: 76 MMHG | SYSTOLIC BLOOD PRESSURE: 122 MMHG | HEIGHT: 63 IN | BODY MASS INDEX: 32.6 KG/M2 | WEIGHT: 184 LBS | OXYGEN SATURATION: 96 % | HEART RATE: 70 BPM

## 2025-09-17 DIAGNOSIS — E03.9 HYPOTHYROIDISM, UNSPECIFIED: ICD-10-CM

## 2025-09-17 DIAGNOSIS — E27.49 OTHER ADRENOCORTICAL INSUFFICIENCY: ICD-10-CM

## 2025-09-17 LAB — TSH SERPL-ACNC: 2.82

## 2025-09-17 PROCEDURE — 99214 OFFICE O/P EST MOD 30 MIN: CPT

## 2025-09-17 PROCEDURE — G2211 COMPLEX E/M VISIT ADD ON: CPT
